# Patient Record
Sex: MALE | Race: WHITE | NOT HISPANIC OR LATINO | Employment: OTHER | URBAN - METROPOLITAN AREA
[De-identification: names, ages, dates, MRNs, and addresses within clinical notes are randomized per-mention and may not be internally consistent; named-entity substitution may affect disease eponyms.]

---

## 2017-01-31 ENCOUNTER — OFFICE VISIT (OUTPATIENT)
Dept: INTERNAL MEDICINE | Facility: CLINIC | Age: 82
End: 2017-01-31

## 2017-01-31 VITALS
WEIGHT: 223.6 LBS | DIASTOLIC BLOOD PRESSURE: 90 MMHG | SYSTOLIC BLOOD PRESSURE: 148 MMHG | HEIGHT: 71 IN | BODY MASS INDEX: 31.3 KG/M2

## 2017-01-31 DIAGNOSIS — I25.10 CORONARY ARTERY DISEASE INVOLVING NATIVE CORONARY ARTERY OF NATIVE HEART WITHOUT ANGINA PECTORIS: ICD-10-CM

## 2017-01-31 DIAGNOSIS — R26.89 BALANCE PROBLEM: ICD-10-CM

## 2017-01-31 DIAGNOSIS — I10 ESSENTIAL HYPERTENSION: Primary | ICD-10-CM

## 2017-01-31 DIAGNOSIS — R53.82 CHRONIC FATIGUE: ICD-10-CM

## 2017-01-31 DIAGNOSIS — E78.49 OTHER HYPERLIPIDEMIA: ICD-10-CM

## 2017-01-31 LAB
ALBUMIN SERPL-MCNC: 3.85 G/DL (ref 3.4–4.6)
ALBUMIN/GLOB SERPL: 1.1 G/DL
ALP SERPL-CCNC: 88 U/L (ref 46–116)
ALT SERPL W P-5'-P-CCNC: 19 U/L (ref 16–63)
ANION GAP SERPL CALCULATED.3IONS-SCNC: 11 MMOL/L
AST SERPL-CCNC: 16 U/L (ref 7–37)
BILIRUB SERPL-MCNC: 0.9 MG/DL (ref 0.2–1)
BUN BLD-MCNC: 19 MG/DL (ref 6–22)
BUN/CREAT SERPL: 19 (ref 7–25)
CALCIUM SPEC-SCNC: 9.2 MG/DL (ref 8.6–10.5)
CHLORIDE SERPL-SCNC: 102 MMOL/L (ref 95–107)
CHOLEST SERPL-MCNC: 125 MG/DL (ref 0–200)
CO2 SERPL-SCNC: 28 MMOL/L (ref 23–32)
CREAT BLD-MCNC: 1 MG/DL (ref 0.7–1.3)
GFR SERPL CREATININE-BSD FRML MDRD: 72 ML/MIN/1.73
GLOBULIN UR ELPH-MCNC: 3.5 GM/DL
GLUCOSE BLD-MCNC: 102 MG/DL (ref 70–100)
HDLC SERPL-MCNC: 61 MG/DL (ref 40–81)
LDLC SERPL CALC-MCNC: 55 MG/DL (ref 0–100)
LDLC/HDLC SERPL: 0.9 {RATIO}
POTASSIUM BLD-SCNC: 4.5 MMOL/L (ref 3.3–5.3)
PROT SERPL-MCNC: 7.3 G/DL (ref 6.3–8.4)
SODIUM BLD-SCNC: 141 MMOL/L (ref 136–145)
TRIGL SERPL-MCNC: 47 MG/DL (ref 0–150)
VLDLC SERPL-MCNC: 9.4 MG/DL

## 2017-01-31 PROCEDURE — 36415 COLL VENOUS BLD VENIPUNCTURE: CPT | Performed by: INTERNAL MEDICINE

## 2017-01-31 PROCEDURE — 80061 LIPID PANEL: CPT | Performed by: INTERNAL MEDICINE

## 2017-01-31 PROCEDURE — 80053 COMPREHEN METABOLIC PANEL: CPT | Performed by: INTERNAL MEDICINE

## 2017-01-31 PROCEDURE — 99213 OFFICE O/P EST LOW 20 MIN: CPT | Performed by: INTERNAL MEDICINE

## 2017-01-31 NOTE — PROGRESS NOTES
"Subjective   Max Shah is a 82 y.o. male here for   Chief Complaint   Patient presents with   • Hyperlipidemia     6 month follow-up   • Hypertension     6 month follow-up   .    Vitals:    01/31/17 1057   BP: 148/90   BP Location: Left arm   Patient Position: Sitting   Cuff Size: Adult   Weight: 223 lb 9.6 oz (101 kg)   Height: 70.5\" (179.1 cm)       Hyperlipidemia   This is a chronic problem. The current episode started more than 1 year ago. The problem is controlled. Recent lipid tests were reviewed and are normal. He has no history of chronic renal disease, diabetes, hypothyroidism or liver disease. Pertinent negatives include no chest pain or shortness of breath.   Hypertension   This is a chronic problem. The current episode started more than 1 year ago. The problem is unchanged. The problem is controlled. Pertinent negatives include no chest pain, palpitations, peripheral edema or shortness of breath. There is no history of chronic renal disease.        Encounter Diagnoses   Name Primary?   • Essential hypertension Yes   • Other hyperlipidemia    • Coronary artery disease involving native coronary artery of native heart without angina pectoris    • Chronic fatigue    • Balance problem        The following portions of the patient's history were reviewed and updated as appropriate: allergies, current medications, past social history and problem list.    Review of Systems   Constitutional: Positive for fatigue. Negative for chills and fever.   Respiratory: Negative for cough, shortness of breath and wheezing.    Cardiovascular: Negative for chest pain, palpitations and leg swelling.   Neurological: Positive for dizziness (occ-with turning head).   Psychiatric/Behavioral: Negative for dysphoric mood and sleep disturbance. The patient is not nervous/anxious.        Objective   Physical Exam   Constitutional: He appears well-developed and well-nourished. No distress.   Cardiovascular: Normal rate, regular " rhythm and normal heart sounds.    Pulmonary/Chest: No respiratory distress. He has no wheezes. He has no rales. He exhibits no tenderness.   Musculoskeletal: He exhibits no edema.   Psychiatric: He has a normal mood and affect. His behavior is normal.   Nursing note and vitals reviewed.      Assessment/Plan   Problem List Items Addressed This Visit        Unprioritized    Hypertension - Primary    Relevant Orders    Comprehensive Metabolic Panel    Lipid Panel    CAD (coronary artery disease)    Fatigue    Hyperlipidemia    Relevant Orders    Comprehensive Metabolic Panel    Lipid Panel      Other Visit Diagnoses     Balance problem        Relevant Orders    Ambulatory Referral to Physical Therapy Evaluate and treat         HTN stable.   Poor balance - need eval by PT.  Will see ENT if sx persist.     Need Tdap, pneu & shingles vaccines if not already done.  Need wellness exam.

## 2017-01-31 NOTE — MR AVS SNAPSHOT
Max Shah   1/31/2017 10:30 AM   Office Visit    Dept Phone:  [phone]   Encounter #:  [id number]    Provider:  Brianne Solomon MD   Department:  [facility] INTERNAL MEDICINE                Your Full Care Plan              Today's Medication Changes          These changes are accurate as of: 1/31/17 11:27 AM.  If you have any questions, ask your nurse or doctor.               Medication(s)that have changed:     losartan 100 MG tablet   Commonly known as:  COZAAR   TAKE 1 TABLET EVERY DAY   What changed:  Another medication with the same name was removed. Continue taking this medication, and follow the directions you see here.   Changed by:  Brianne Solomon MD                  Your Updated Medication List          This list is accurate as of: 1/31/17 11:27 AM.  Always use your most recent med list.                aspirin 81 MG EC tablet       diltiaZEM 120 MG 24 hr capsule   Commonly known as:  TIAZAC       losartan 100 MG tablet   Commonly known as:  COZAAR   TAKE 1 TABLET EVERY DAY       MULTI-VITAMIN DAILY PO       NITROSTAT 0.4 MG SL tablet   Generic drug:  nitroglycerin       simvastatin 20 MG tablet   Commonly known as:  ZOCOR               We Performed the Following     Ambulatory Referral to Physical Therapy Evaluate and treat       You Were Diagnosed With        Codes Comments    Essential hypertension    -  Primary ICD-10-CM: I10  ICD-9-CM: 401.9     Other hyperlipidemia     ICD-10-CM: E78.4  ICD-9-CM: 272.4     Coronary artery disease involving native coronary artery of native heart without angina pectoris     ICD-10-CM: I25.10  ICD-9-CM: 414.01     Chronic fatigue     ICD-10-CM: R53.82  ICD-9-CM: 780.79     Balance problem     ICD-10-CM: R26.89  ICD-9-CM: 781.99       Instructions     None    Patient Instructions History      Upcoming Appointments     Visit Type Date Time Department    FOLLOW UP 1/31/2017 10:30 AM RAFAEL AGUIRRE  "MEDICARE WELLNESS 2017 10:30 AM RAFAEL KRAMER      [facility] Signup     [facility] Setred allows you to send messages to your doctor, view your test results, renew your prescriptions, schedule appointments, and more. To sign up, go to [employer] and click on the Sign Up Now link in the New User? box. Enter your Setred Activation Code exactly as it appears below along with the last four digits of your Social Security Number and your Date of Birth () to complete the sign-up process. If you do not sign up before the expiration date, you must request a new code.    Setred Activation Code: [email or web address]  Expires: 2017 11:27 AM    If you have questions, you can email Belsito [email or web address] or call [phone] to talk to our Setred staff. Remember, Setred is NOT to be used for urgent needs. For medical emergencies, dial 911.               Other Info from Your Visit           Your Appointments     Aug 01, 2017 10:30 AM EDT   Subsequent Medicare Wellness with Brianne Solomon MD   [place] [facility] INTERNAL MEDICINE (--)    [address] [address] [phone]              Allergies     No Known Allergies      Reason for Visit     Hyperlipidemia 6 month follow-up    Hypertension 6 month follow-up      Vital Signs     Blood Pressure Height Weight Body Mass Index Smoking Status       148/90 (BP Location: Left arm, Patient Position: Sitting, Cuff Size: Adult) 70.5\" (179.1 cm) 223 lb 9.6 oz (101 kg) 31.63 kg/m2 Never Smoker       Problems and Diagnoses Noted     Coronary heart disease    Tiredness    High cholesterol or triglycerides    High blood pressure    Balance problem            "

## 2017-02-14 ENCOUNTER — HOSPITAL ENCOUNTER (OUTPATIENT)
Facility: HOSPITAL | Age: 82
Setting detail: HOSPITAL OUTPATIENT SURGERY
Discharge: HOME | End: 2017-02-14
Attending: OPHTHALMOLOGY | Admitting: OPHTHALMOLOGY

## 2017-02-14 ENCOUNTER — ANESTHESIA EVENT (OUTPATIENT)
Dept: PERIOP | Facility: HOSPITAL | Age: 82
End: 2017-02-14

## 2017-02-14 ENCOUNTER — ANESTHESIA (OUTPATIENT)
Dept: PERIOP | Facility: HOSPITAL | Age: 82
End: 2017-02-14

## 2017-02-14 VITALS
SYSTOLIC BLOOD PRESSURE: 151 MMHG | HEART RATE: 72 BPM | RESPIRATION RATE: 16 BRPM | TEMPERATURE: 97.8 F | BODY MASS INDEX: 31.12 KG/M2 | WEIGHT: 220 LBS | DIASTOLIC BLOOD PRESSURE: 73 MMHG | OXYGEN SATURATION: 94 %

## 2017-02-14 PROCEDURE — 25010000002 PROPOFOL 10 MG/ML EMULSION: Performed by: NURSE ANESTHETIST, CERTIFIED REGISTERED

## 2017-02-14 PROCEDURE — 25010000002 EPINEPHRINE PER 0.1 MG: Performed by: OPHTHALMOLOGY

## 2017-02-14 PROCEDURE — C1780 LENS, INTRAOCULAR (NEW TECH): HCPCS | Performed by: OPHTHALMOLOGY

## 2017-02-14 PROCEDURE — 25010000002 FENTANYL CITRATE (PF) 100 MCG/2ML SOLUTION: Performed by: NURSE ANESTHETIST, CERTIFIED REGISTERED

## 2017-02-14 DEVICE — LENS ACRYSOF IQ 6X13MM SN60WF 21.0: Type: IMPLANTABLE DEVICE | Site: EYE | Status: FUNCTIONAL

## 2017-02-14 RX ORDER — HYDRALAZINE HYDROCHLORIDE 20 MG/ML
5 INJECTION INTRAMUSCULAR; INTRAVENOUS
Status: CANCELLED | OUTPATIENT
Start: 2017-02-14

## 2017-02-14 RX ORDER — MAGNESIUM HYDROXIDE 1200 MG/15ML
LIQUID ORAL AS NEEDED
Status: DISCONTINUED | OUTPATIENT
Start: 2017-02-14 | End: 2017-02-14 | Stop reason: HOSPADM

## 2017-02-14 RX ORDER — TROPICAMIDE 10 MG/ML
2 SOLUTION/ DROPS OPHTHALMIC
Status: DISCONTINUED | OUTPATIENT
Start: 2017-02-14 | End: 2017-02-14

## 2017-02-14 RX ORDER — MIDAZOLAM HYDROCHLORIDE 1 MG/ML
1 INJECTION INTRAMUSCULAR; INTRAVENOUS
Status: DISCONTINUED | OUTPATIENT
Start: 2017-02-14 | End: 2017-02-14 | Stop reason: HOSPADM

## 2017-02-14 RX ORDER — PROPOFOL 10 MG/ML
VIAL (ML) INTRAVENOUS AS NEEDED
Status: DISCONTINUED | OUTPATIENT
Start: 2017-02-14 | End: 2017-02-14 | Stop reason: SURG

## 2017-02-14 RX ORDER — MIDAZOLAM HYDROCHLORIDE 1 MG/ML
2 INJECTION INTRAMUSCULAR; INTRAVENOUS
Status: DISCONTINUED | OUTPATIENT
Start: 2017-02-14 | End: 2017-02-14 | Stop reason: HOSPADM

## 2017-02-14 RX ORDER — SODIUM CHLORIDE 0.9 % (FLUSH) 0.9 %
1-10 SYRINGE (ML) INJECTION AS NEEDED
Status: DISCONTINUED | OUTPATIENT
Start: 2017-02-14 | End: 2017-02-14 | Stop reason: HOSPADM

## 2017-02-14 RX ORDER — FLUMAZENIL 0.1 MG/ML
0.2 INJECTION INTRAVENOUS AS NEEDED
Status: CANCELLED | OUTPATIENT
Start: 2017-02-14

## 2017-02-14 RX ORDER — TROPICAMIDE 10 MG/ML
1 SOLUTION/ DROPS OPHTHALMIC ONCE
Status: DISCONTINUED | OUTPATIENT
Start: 2017-02-14 | End: 2017-02-14

## 2017-02-14 RX ORDER — SODIUM CHLORIDE, SODIUM LACTATE, POTASSIUM CHLORIDE, CALCIUM CHLORIDE 600; 310; 30; 20 MG/100ML; MG/100ML; MG/100ML; MG/100ML
9 INJECTION, SOLUTION INTRAVENOUS CONTINUOUS
Status: DISCONTINUED | OUTPATIENT
Start: 2017-02-14 | End: 2017-02-14 | Stop reason: HOSPADM

## 2017-02-14 RX ORDER — FENTANYL CITRATE 50 UG/ML
50 INJECTION, SOLUTION INTRAMUSCULAR; INTRAVENOUS
Status: CANCELLED | OUTPATIENT
Start: 2017-02-14

## 2017-02-14 RX ORDER — FENTANYL CITRATE 50 UG/ML
INJECTION, SOLUTION INTRAMUSCULAR; INTRAVENOUS AS NEEDED
Status: DISCONTINUED | OUTPATIENT
Start: 2017-02-14 | End: 2017-02-14 | Stop reason: SURG

## 2017-02-14 RX ORDER — HYDROMORPHONE HYDROCHLORIDE 1 MG/ML
0.25 INJECTION, SOLUTION INTRAMUSCULAR; INTRAVENOUS; SUBCUTANEOUS
Status: CANCELLED | OUTPATIENT
Start: 2017-02-14

## 2017-02-14 RX ORDER — NEOMYCIN SULFATE, POLYMYXIN B SULFATE, AND DEXAMETHASONE 3.5; 10000; 1 MG/G; [USP'U]/G; MG/G
OINTMENT OPHTHALMIC AS NEEDED
Status: DISCONTINUED | OUTPATIENT
Start: 2017-02-14 | End: 2017-02-14 | Stop reason: HOSPADM

## 2017-02-14 RX ORDER — PROPARACAINE HYDROCHLORIDE 5 MG/ML
SOLUTION/ DROPS OPHTHALMIC AS NEEDED
Status: DISCONTINUED | OUTPATIENT
Start: 2017-02-14 | End: 2017-02-14 | Stop reason: HOSPADM

## 2017-02-14 RX ORDER — PROMETHAZINE HYDROCHLORIDE 25 MG/ML
12.5 INJECTION, SOLUTION INTRAMUSCULAR; INTRAVENOUS ONCE AS NEEDED
Status: CANCELLED | OUTPATIENT
Start: 2017-02-14

## 2017-02-14 RX ORDER — LABETALOL HYDROCHLORIDE 5 MG/ML
5 INJECTION, SOLUTION INTRAVENOUS
Status: CANCELLED | OUTPATIENT
Start: 2017-02-14

## 2017-02-14 RX ORDER — PROPARACAINE HYDROCHLORIDE 5 MG/ML
1 SOLUTION/ DROPS OPHTHALMIC ONCE
Status: COMPLETED | OUTPATIENT
Start: 2017-02-14 | End: 2017-02-14

## 2017-02-14 RX ORDER — FENTANYL CITRATE 50 UG/ML
100 INJECTION, SOLUTION INTRAMUSCULAR; INTRAVENOUS
Status: DISCONTINUED | OUTPATIENT
Start: 2017-02-14 | End: 2017-02-14 | Stop reason: HOSPADM

## 2017-02-14 RX ORDER — PROMETHAZINE HYDROCHLORIDE 25 MG/1
25 TABLET ORAL ONCE AS NEEDED
Status: CANCELLED | OUTPATIENT
Start: 2017-02-14

## 2017-02-14 RX ORDER — PROPARACAINE HYDROCHLORIDE 5 MG/ML
1 SOLUTION/ DROPS OPHTHALMIC ONCE
Status: DISCONTINUED | OUTPATIENT
Start: 2017-02-14 | End: 2017-02-14

## 2017-02-14 RX ORDER — PHENYLEPHRINE HCL 2.5 %
1 DROPS OPHTHALMIC (EYE)
Status: COMPLETED | OUTPATIENT
Start: 2017-02-14 | End: 2017-02-14

## 2017-02-14 RX ORDER — PHENYLEPHRINE HCL 2.5 %
1 DROPS OPHTHALMIC (EYE) ONCE
Status: DISCONTINUED | OUTPATIENT
Start: 2017-02-14 | End: 2017-02-14

## 2017-02-14 RX ORDER — PROMETHAZINE HYDROCHLORIDE 25 MG/1
25 SUPPOSITORY RECTAL ONCE AS NEEDED
Status: CANCELLED | OUTPATIENT
Start: 2017-02-14

## 2017-02-14 RX ORDER — LIDOCAINE HYDROCHLORIDE 10 MG/ML
5 INJECTION, SOLUTION EPIDURAL; INFILTRATION; INTRACAUDAL; PERINEURAL ONCE AS NEEDED
Status: COMPLETED | OUTPATIENT
Start: 2017-02-14 | End: 2017-02-14

## 2017-02-14 RX ORDER — ONDANSETRON 2 MG/ML
4 INJECTION INTRAMUSCULAR; INTRAVENOUS ONCE AS NEEDED
Status: CANCELLED | OUTPATIENT
Start: 2017-02-14

## 2017-02-14 RX ORDER — LIDOCAINE HYDROCHLORIDE AND EPINEPHRINE BITARTRATE 20; .01 MG/ML; MG/ML
INJECTION, SOLUTION SUBCUTANEOUS AS NEEDED
Status: DISCONTINUED | OUTPATIENT
Start: 2017-02-14 | End: 2017-02-14 | Stop reason: HOSPADM

## 2017-02-14 RX ORDER — PROMETHAZINE HYDROCHLORIDE 25 MG/1
12.5 TABLET ORAL ONCE AS NEEDED
Status: CANCELLED | OUTPATIENT
Start: 2017-02-14 | End: 2017-02-15

## 2017-02-14 RX ORDER — TROPICAMIDE 10 MG/ML
1 SOLUTION/ DROPS OPHTHALMIC
Status: COMPLETED | OUTPATIENT
Start: 2017-02-14 | End: 2017-02-14

## 2017-02-14 RX ORDER — HYDROCODONE BITARTRATE AND ACETAMINOPHEN 7.5; 325 MG/1; MG/1
1 TABLET ORAL ONCE AS NEEDED
Status: CANCELLED | OUTPATIENT
Start: 2017-02-14 | End: 2017-02-15

## 2017-02-14 RX ORDER — FAMOTIDINE 10 MG/ML
20 INJECTION, SOLUTION INTRAVENOUS ONCE
Status: DISCONTINUED | OUTPATIENT
Start: 2017-02-14 | End: 2017-02-14 | Stop reason: HOSPADM

## 2017-02-14 RX ORDER — HYDROMORPHONE HYDROCHLORIDE 1 MG/ML
0.5 INJECTION, SOLUTION INTRAMUSCULAR; INTRAVENOUS; SUBCUTANEOUS
Status: CANCELLED | OUTPATIENT
Start: 2017-02-14

## 2017-02-14 RX ORDER — OXYCODONE AND ACETAMINOPHEN 7.5; 325 MG/1; MG/1
1 TABLET ORAL ONCE AS NEEDED
Status: CANCELLED | OUTPATIENT
Start: 2017-02-14 | End: 2017-02-15

## 2017-02-14 RX ORDER — PHENYLEPHRINE HCL 2.5 %
2 DROPS OPHTHALMIC (EYE)
Status: DISCONTINUED | OUTPATIENT
Start: 2017-02-14 | End: 2017-02-14

## 2017-02-14 RX ORDER — NALOXONE HCL 0.4 MG/ML
0.2 VIAL (ML) INJECTION AS NEEDED
Status: CANCELLED | OUTPATIENT
Start: 2017-02-14

## 2017-02-14 RX ORDER — DIPHENHYDRAMINE HYDROCHLORIDE 50 MG/ML
12.5 INJECTION INTRAMUSCULAR; INTRAVENOUS
Status: CANCELLED | OUTPATIENT
Start: 2017-02-14

## 2017-02-14 RX ADMIN — PROPOFOL 20 MG: 10 INJECTION, EMULSION INTRAVENOUS at 14:38

## 2017-02-14 RX ADMIN — FENTANYL CITRATE 25 MCG: 50 INJECTION INTRAMUSCULAR; INTRAVENOUS at 14:40

## 2017-02-14 RX ADMIN — SODIUM CHLORIDE, POTASSIUM CHLORIDE, SODIUM LACTATE AND CALCIUM CHLORIDE 9 ML/HR: 600; 310; 30; 20 INJECTION, SOLUTION INTRAVENOUS at 12:55

## 2017-02-14 RX ADMIN — PROPOFOL 10 MG: 10 INJECTION, EMULSION INTRAVENOUS at 14:49

## 2017-02-14 RX ADMIN — PROPOFOL 20 MG: 10 INJECTION, EMULSION INTRAVENOUS at 14:37

## 2017-02-14 RX ADMIN — PROPOFOL 10 MG: 10 INJECTION, EMULSION INTRAVENOUS at 14:47

## 2017-02-14 RX ADMIN — FENTANYL CITRATE 25 MCG: 50 INJECTION INTRAMUSCULAR; INTRAVENOUS at 14:34

## 2017-02-14 RX ADMIN — PHENYLEPHRINE HYDROCHLORIDE 1 DROP: 25 SOLUTION/ DROPS OPHTHALMIC at 13:07

## 2017-02-14 RX ADMIN — PHENYLEPHRINE HYDROCHLORIDE 1 DROP: 25 SOLUTION/ DROPS OPHTHALMIC at 12:57

## 2017-02-14 RX ADMIN — LIDOCAINE HYDROCHLORIDE 0.5 ML: 10 INJECTION, SOLUTION EPIDURAL; INFILTRATION; INTRACAUDAL; PERINEURAL at 12:55

## 2017-02-14 RX ADMIN — PROPOFOL 20 MG: 10 INJECTION, EMULSION INTRAVENOUS at 14:39

## 2017-02-14 RX ADMIN — TROPICAMIDE 1 DROP: 10 SOLUTION/ DROPS OPHTHALMIC at 13:07

## 2017-02-14 RX ADMIN — TROPICAMIDE 1 DROP: 10 SOLUTION/ DROPS OPHTHALMIC at 12:57

## 2017-02-14 RX ADMIN — FENTANYL CITRATE 25 MCG: 50 INJECTION INTRAMUSCULAR; INTRAVENOUS at 14:52

## 2017-02-14 RX ADMIN — FENTANYL CITRATE 25 MCG: 50 INJECTION INTRAMUSCULAR; INTRAVENOUS at 14:25

## 2017-02-14 RX ADMIN — PROPARACAINE HYDROCHLORIDE 1 DROP: 5 SOLUTION/ DROPS OPHTHALMIC at 12:56

## 2017-02-14 NOTE — PLAN OF CARE
Problem: Patient Care Overview (Adult)  Goal: Discharge Needs Assessment  Outcome: Ongoing (interventions implemented as appropriate)    02/14/17 1223   Discharge Needs Assessment   Concerns To Be Addressed no discharge needs identified   Equipment Needed After Discharge none   Discharge Disposition home or self-care   Current Health   Anticipated Changes Related to Illness none   Self-Care   Equipment Currently Used at Home none   Living Environment   Transportation Available car;family or friend will provide

## 2017-02-14 NOTE — PLAN OF CARE
Problem: Perioperative Period (Adult)  Goal: Signs and Symptoms of Listed Potential Problems Will be Absent or Manageable (Perioperative Period)  Outcome: Ongoing (interventions implemented as appropriate)    02/14/17 1222   Perioperative Period   Problems Assessed (Perioperative Period) all   Problems Present (Perioperative Period) none

## 2017-02-14 NOTE — ANESTHESIA PREPROCEDURE EVALUATION
Anesthesia Evaluation     Patient summary reviewed and Nursing notes reviewed   NPO Status: > 8 hours   Airway   Mallampati: II  TM distance: >3 FB  Neck ROM: full  no difficulty expected  Dental - normal exam     Pulmonary - negative pulmonary ROS and normal exam   Cardiovascular - normal exam    (+) hypertension, CAD,       Neuro/Psych- negative ROS  GI/Hepatic/Renal/Endo - negative ROS     Musculoskeletal (-) negative ROS    Abdominal  - normal exam   Substance History - negative use     OB/GYN negative ob/gyn ROS         Other                                    Anesthesia Plan    ASA 3     MAC     intravenous induction   Anesthetic plan and risks discussed with patient.    Plan discussed with CRNA.

## 2017-02-14 NOTE — H&P
this patient enters hospital for surgical correction of cataract the patient had painless progressive loss of visual acuity for some time the right eye is getting the point with the patient cannot function properly is interfering with his lifestyle and needs therefore enters hospital for surgical correction.  Patient Care Team:  Brianne Solomon MD as PCP - General (Internal Medicine)  Lulú Bettencourt MD as Consulting Physician (Cardiology)  Bear Boyd MD as Consulting Physician (Otolaryngology)  Skip Malik MD as Consulting Physician (Gastroenterology)  Clarke Bettencourt MD as Consulting Physician (Dermatology)  Audrey Elena MD as Consulting Physician (Vascular Surgery)    Chief complaint is blurred vision of the right eye.    Subjective     History of Present IllnessReview of Systems    this patient enters hospital for surgical correction off a dense cataract of the right eye.  This patient has had painless progressive loss of visual acuity for some time the visual acuity is decreased to point where he's had for its interfering with his lifestyle and needs he is having difficulty driving a car also has tremendous glare at night enters hospital for surgical correction.  This patient involving office for some time and of answered all questions and is well very cooperative and also anxious anxious to have the surgery done.  All systems been reviewed and patient generally in good health for his age.  He is 82 years old and very active person.      Past Medical History   Diagnosis Date   • Arthritis    • CAD (coronary artery disease)    • Cataracts, bilateral    • Diverticulitis    • Fatigue    • GERD (gastroesophageal reflux disease)    • Hypertension    • Knee pain    • PAOD (peripheral arterial occlusive disease)    • Shortness of breath    • Varicose veins      Past Surgical History   Procedure Laterality Date   • Carotid artery angioplasty  01/12/2001   • Cardiac catheterization     •  Thromboendarterectomy Right    • Angioplasty  2002     Cerebral     Family History   Problem Relation Age of Onset   • Heart disease Other    • Cancer Other    • Breast cancer Mother       age 75   • Atrial fibrillation Father       age 82     Social History   Substance Use Topics   • Smoking status: Never Smoker   • Smokeless tobacco: Never Used   • Alcohol use Yes      Comment: moderate     Prescriptions Prior to Admission   Medication Sig Dispense Refill Last Dose   • aspirin 81 MG EC tablet Take 81 mg by mouth daily.   2017 at Unknown time   • diltiazem (TIAZAC) 120 MG 24 hr capsule Take 120 mg by mouth.   2017 at Unknown time   • losartan (COZAAR) 100 MG tablet TAKE 1 TABLET EVERY DAY 90 tablet 3 2017 at Unknown time   • Multiple Vitamin (MULTI-VITAMIN DAILY PO) Take 1 tablet by mouth Daily.   2017 at Unknown time   • nitroglycerin (NITROSTAT) 0.4 MG SL tablet Place 0.4 mg under the tongue every 5 (five) minutes.   2017 at Unknown time   • simvastatin (ZOCOR) 20 MG tablet Take 20 mg by mouth.   2017 at Unknown time     Allergies:  Review of patient's allergies indicates no known allergies.    Objective      Vital Signs  Temp:  [95.5 °F (35.3 °C)] 95.5 °F (35.3 °C)  Heart Rate:  [75] 75  Resp:  [16] 16  BP: (156)/(100) 156/100    Physical Exam exam reveals a well-developed well-nourished man who is cooperative and anxious and very alert for his age.  He is oriented ×3 in and wants to have cataract surgery.  The best vision of the right eye is 20/50 -3 cannot be improved he's been involving office for some time and and I noticed the progression of the cataract of the last few years.  Head mesencephalic chest good expansion heart regular sinus rhythm abdomen soft and no masses extremities normal neurological normal.      Results Review:   I reviewed the patient's new clinical results.      Assessment/Plan   plan is to do cataract operation lens implantation of the right  eye of age and all questions and also discusses with family and the patient.            Active Problems:    * No active hospital problems. *        I discussed the patients findings and my recommendations with patient and family    Syed Tang MD  02/14/17  12:56 PM

## 2017-02-14 NOTE — PLAN OF CARE
Problem: Patient Care Overview (Adult)  Goal: Plan of Care Review  Outcome: Outcome(s) achieved Date Met:  02/14/17 02/14/17 1523   Coping/Psychosocial Response Interventions   Plan Of Care Reviewed With patient       Goal: Discharge Needs Assessment  Outcome: Outcome(s) achieved Date Met:  02/14/17 02/14/17 1523   Discharge Needs Assessment   Concerns To Be Addressed denies needs/concerns at this time

## 2017-02-14 NOTE — PLAN OF CARE
Problem: Patient Care Overview (Adult)  Goal: Plan of Care Review  Outcome: Ongoing (interventions implemented as appropriate)    02/14/17 1223   Coping/Psychosocial Response Interventions   Plan Of Care Reviewed With patient   Patient Care Overview   Progress improving

## 2017-02-14 NOTE — PLAN OF CARE
Problem: Perioperative Period (Adult)  Goal: Signs and Symptoms of Listed Potential Problems Will be Absent or Manageable (Perioperative Period)  Outcome: Unable to achieve outcome(s) by discharge Date Met:  02/14/17

## 2017-02-14 NOTE — ANESTHESIA POSTPROCEDURE EVALUATION
Patient: Max Shah    Procedure Summary     Date Anesthesia Start Anesthesia Stop Room / Location    02/14/17 1419 1515  [facility] / [facility]       Procedure Diagnosis Surgeon Provider    LT CATARACT PHACO EXTRACTION WITH INTRAOCULAR LENS IMPLANT (Left Eye) No diagnosis on file. MD Joe Dugan MD          Anesthesia Type: MAC  Last vitals  /73 (02/14/17 1530)    Temp 36.6 °C (97.8 °F) (02/14/17 1514)    Pulse 72 (02/14/17 1530)   Resp 16 (02/14/17 1530)    SpO2 94 % (02/14/17 1530)      Post Anesthesia Care and Evaluation    Patient location during evaluation: PACU  Patient participation: complete - patient participated  Level of consciousness: awake and alert  Pain management: adequate  Airway patency: patent  Anesthetic complications: No anesthetic complications    Cardiovascular status: acceptable  Respiratory status: acceptable  Hydration status: acceptable

## 2017-02-15 NOTE — OP NOTE
Date of Procedure:  02/14/2017     PREOPERATIVE DIAGNOSIS: Nuclear sclerotic cataract and posterior plaque-type cataract of the left eye.     POSTOPERATIVE DIAGNOSIS: Nuclear sclerotic cataract and posterior plaque-type cataract of the left eye.     PROCEDURES PERFORMED: Phacoemulsification and lens implantation, left eye. Type of lens is Paul posterior chamber, model # [id number]. Power is 21 diopters.     SURGEON: Syed Tang MD    ANESTHESIA: Local monitored.     DESCRIPTION OF PROCEDURE: This patient was brought to the operating room where a peribulbar injection of 2% Xylocaine was given. A soft eye was obtained. Routine preparation and drape was carried out and the eye was irrigated with copious amounts of povidone iodine solution. During the operation, sedation and monitoring was given by the anesthesiology department. There were no complications or problems during the procedure and the patient left the operating room in excellent condition.     The microscope was used and a clear corneal 2.4 mm incision was made temporally. The eye was filled with Viscoat and a paracentesis was done at 90°. Then, a round circular continuous capsulorrhexis was perfected without difficulty and this was followed by adequate hydrodissection loosened the nucleus. After the nucleus was loosened satisfactorily, bimanual endolenticular phacoemulsification was done slowly and carefully. The irrigation and aspiration tip was used to remove all cortical material and then  posterior capsule was polished satisfactorily. The lens implant then was placed in the capsular bag with Provisc assistance. After this was done and the viscoelastic material was carefully removed from the eye, the wound was hydrated and intraocular pressure was adjusted. Then again, the eye was irrigated with copious amounts of povidone iodine solution. Antibiotics were applied. A shield was placed and the patient left the operating room in excellent condition.        Syed Tang M.D.  HCS:abimael  D:   02/14/2017 15:17:48  T:   02/14/2017 19:36:05  Job ID:   [id number]  Document ID:   [id number]  cc:

## 2017-02-28 ENCOUNTER — ANESTHESIA (OUTPATIENT)
Dept: PERIOP | Facility: HOSPITAL | Age: 82
End: 2017-02-28

## 2017-02-28 ENCOUNTER — ANESTHESIA EVENT (OUTPATIENT)
Dept: PERIOP | Facility: HOSPITAL | Age: 82
End: 2017-02-28

## 2017-02-28 ENCOUNTER — HOSPITAL ENCOUNTER (OUTPATIENT)
Facility: HOSPITAL | Age: 82
Setting detail: HOSPITAL OUTPATIENT SURGERY
Discharge: HOME | End: 2017-02-28
Attending: OPHTHALMOLOGY | Admitting: OPHTHALMOLOGY

## 2017-02-28 VITALS
DIASTOLIC BLOOD PRESSURE: 85 MMHG | SYSTOLIC BLOOD PRESSURE: 138 MMHG | RESPIRATION RATE: 20 BRPM | WEIGHT: 222 LBS | TEMPERATURE: 97.6 F | HEART RATE: 86 BPM | BODY MASS INDEX: 31.4 KG/M2 | OXYGEN SATURATION: 95 %

## 2017-02-28 PROCEDURE — 25010000002 MIDAZOLAM PER 1 MG: Performed by: NURSE ANESTHETIST, CERTIFIED REGISTERED

## 2017-02-28 PROCEDURE — 25010000002 EPINEPHRINE 1 MG/ML SOLUTION: Performed by: OPHTHALMOLOGY

## 2017-02-28 PROCEDURE — C1780 LENS, INTRAOCULAR (NEW TECH): HCPCS | Performed by: OPHTHALMOLOGY

## 2017-02-28 PROCEDURE — 25010000002 PROPOFOL 10 MG/ML EMULSION: Performed by: NURSE ANESTHETIST, CERTIFIED REGISTERED

## 2017-02-28 DEVICE — LENS ACRYSOF IQ 6X13MM SN60WF 20.0: Type: IMPLANTABLE DEVICE | Site: EYE | Status: FUNCTIONAL

## 2017-02-28 RX ORDER — HYDROCODONE BITARTRATE AND ACETAMINOPHEN 7.5; 325 MG/1; MG/1
1 TABLET ORAL ONCE AS NEEDED
Status: DISCONTINUED | OUTPATIENT
Start: 2017-02-28 | End: 2017-02-28 | Stop reason: HOSPADM

## 2017-02-28 RX ORDER — LABETALOL HYDROCHLORIDE 5 MG/ML
5 INJECTION, SOLUTION INTRAVENOUS
Status: DISCONTINUED | OUTPATIENT
Start: 2017-02-28 | End: 2017-02-28 | Stop reason: HOSPADM

## 2017-02-28 RX ORDER — NEOMYCIN SULFATE, POLYMYXIN B SULFATE AND DEXAMETHASONE 3.5; 10000; 1 MG/ML; [USP'U]/ML; MG/ML
SUSPENSION/ DROPS OPHTHALMIC AS NEEDED
Status: DISCONTINUED | OUTPATIENT
Start: 2017-02-28 | End: 2017-02-28 | Stop reason: HOSPADM

## 2017-02-28 RX ORDER — ONDANSETRON 2 MG/ML
4 INJECTION INTRAMUSCULAR; INTRAVENOUS ONCE AS NEEDED
Status: DISCONTINUED | OUTPATIENT
Start: 2017-02-28 | End: 2017-02-28 | Stop reason: HOSPADM

## 2017-02-28 RX ORDER — SODIUM CHLORIDE 0.9 % (FLUSH) 0.9 %
1-10 SYRINGE (ML) INJECTION AS NEEDED
Status: DISCONTINUED | OUTPATIENT
Start: 2017-02-28 | End: 2017-02-28 | Stop reason: HOSPADM

## 2017-02-28 RX ORDER — OXYCODONE AND ACETAMINOPHEN 7.5; 325 MG/1; MG/1
1 TABLET ORAL ONCE AS NEEDED
Status: DISCONTINUED | OUTPATIENT
Start: 2017-02-28 | End: 2017-02-28 | Stop reason: HOSPADM

## 2017-02-28 RX ORDER — MIDAZOLAM HYDROCHLORIDE 1 MG/ML
2 INJECTION INTRAMUSCULAR; INTRAVENOUS
Status: DISCONTINUED | OUTPATIENT
Start: 2017-02-28 | End: 2017-02-28 | Stop reason: HOSPADM

## 2017-02-28 RX ORDER — HYDROCODONE BITARTRATE AND ACETAMINOPHEN 7.5; 325 MG/1; MG/1
1 TABLET ORAL ONCE AS NEEDED
Status: DISCONTINUED | OUTPATIENT
Start: 2017-02-28 | End: 2017-02-28 | Stop reason: SDUPTHER

## 2017-02-28 RX ORDER — NALOXONE HCL 0.4 MG/ML
0.2 VIAL (ML) INJECTION AS NEEDED
Status: DISCONTINUED | OUTPATIENT
Start: 2017-02-28 | End: 2017-02-28 | Stop reason: SDUPTHER

## 2017-02-28 RX ORDER — PHENYLEPHRINE HCL 2.5 %
1 DROPS OPHTHALMIC (EYE)
Status: COMPLETED | OUTPATIENT
Start: 2017-02-28 | End: 2017-02-28

## 2017-02-28 RX ORDER — FENTANYL CITRATE 50 UG/ML
25 INJECTION, SOLUTION INTRAMUSCULAR; INTRAVENOUS
Status: DISCONTINUED | OUTPATIENT
Start: 2017-02-28 | End: 2017-02-28 | Stop reason: HOSPADM

## 2017-02-28 RX ORDER — TROPICAMIDE 10 MG/ML
1 SOLUTION/ DROPS OPHTHALMIC
Status: COMPLETED | OUTPATIENT
Start: 2017-02-28 | End: 2017-02-28

## 2017-02-28 RX ORDER — PROPARACAINE HYDROCHLORIDE 5 MG/ML
SOLUTION/ DROPS OPHTHALMIC AS NEEDED
Status: DISCONTINUED | OUTPATIENT
Start: 2017-02-28 | End: 2017-02-28 | Stop reason: HOSPADM

## 2017-02-28 RX ORDER — HYDRALAZINE HYDROCHLORIDE 20 MG/ML
5 INJECTION INTRAMUSCULAR; INTRAVENOUS
Status: DISCONTINUED | OUTPATIENT
Start: 2017-02-28 | End: 2017-02-28 | Stop reason: SDUPTHER

## 2017-02-28 RX ORDER — MIDAZOLAM HYDROCHLORIDE 1 MG/ML
1 INJECTION INTRAMUSCULAR; INTRAVENOUS
Status: DISCONTINUED | OUTPATIENT
Start: 2017-02-28 | End: 2017-02-28 | Stop reason: HOSPADM

## 2017-02-28 RX ORDER — PROMETHAZINE HYDROCHLORIDE 25 MG/1
12.5 TABLET ORAL ONCE AS NEEDED
Status: DISCONTINUED | OUTPATIENT
Start: 2017-02-28 | End: 2017-02-28 | Stop reason: SDUPTHER

## 2017-02-28 RX ORDER — HYDROMORPHONE HYDROCHLORIDE 1 MG/ML
0.5 INJECTION, SOLUTION INTRAMUSCULAR; INTRAVENOUS; SUBCUTANEOUS
Status: DISCONTINUED | OUTPATIENT
Start: 2017-02-28 | End: 2017-02-28 | Stop reason: HOSPADM

## 2017-02-28 RX ORDER — FENTANYL CITRATE 50 UG/ML
50 INJECTION, SOLUTION INTRAMUSCULAR; INTRAVENOUS
Status: DISCONTINUED | OUTPATIENT
Start: 2017-02-28 | End: 2017-02-28 | Stop reason: HOSPADM

## 2017-02-28 RX ORDER — MIDAZOLAM HYDROCHLORIDE 1 MG/ML
INJECTION INTRAMUSCULAR; INTRAVENOUS AS NEEDED
Status: DISCONTINUED | OUTPATIENT
Start: 2017-02-28 | End: 2017-02-28 | Stop reason: SURG

## 2017-02-28 RX ORDER — SODIUM CHLORIDE, SODIUM LACTATE, POTASSIUM CHLORIDE, CALCIUM CHLORIDE 600; 310; 30; 20 MG/100ML; MG/100ML; MG/100ML; MG/100ML
9 INJECTION, SOLUTION INTRAVENOUS CONTINUOUS
Status: DISCONTINUED | OUTPATIENT
Start: 2017-02-28 | End: 2017-02-28 | Stop reason: HOSPADM

## 2017-02-28 RX ORDER — MAGNESIUM HYDROXIDE 1200 MG/15ML
LIQUID ORAL AS NEEDED
Status: DISCONTINUED | OUTPATIENT
Start: 2017-02-28 | End: 2017-02-28 | Stop reason: HOSPADM

## 2017-02-28 RX ORDER — DIPHENHYDRAMINE HYDROCHLORIDE 50 MG/ML
12.5 INJECTION INTRAMUSCULAR; INTRAVENOUS
Status: DISCONTINUED | OUTPATIENT
Start: 2017-02-28 | End: 2017-02-28 | Stop reason: HOSPADM

## 2017-02-28 RX ORDER — FLUMAZENIL 0.1 MG/ML
0.2 INJECTION INTRAVENOUS AS NEEDED
Status: DISCONTINUED | OUTPATIENT
Start: 2017-02-28 | End: 2017-02-28 | Stop reason: SDUPTHER

## 2017-02-28 RX ORDER — EPINEPHRINE 1 MG/ML
INJECTION INTRAMUSCULAR; INTRAVENOUS; SUBCUTANEOUS AS NEEDED
Status: DISCONTINUED | OUTPATIENT
Start: 2017-02-28 | End: 2017-02-28 | Stop reason: HOSPADM

## 2017-02-28 RX ORDER — FLUMAZENIL 0.1 MG/ML
0.2 INJECTION INTRAVENOUS AS NEEDED
Status: DISCONTINUED | OUTPATIENT
Start: 2017-02-28 | End: 2017-02-28 | Stop reason: HOSPADM

## 2017-02-28 RX ORDER — HYDROMORPHONE HYDROCHLORIDE 1 MG/ML
0.25 INJECTION, SOLUTION INTRAMUSCULAR; INTRAVENOUS; SUBCUTANEOUS
Status: DISCONTINUED | OUTPATIENT
Start: 2017-02-28 | End: 2017-02-28 | Stop reason: HOSPADM

## 2017-02-28 RX ORDER — PROMETHAZINE HYDROCHLORIDE 25 MG/ML
12.5 INJECTION, SOLUTION INTRAMUSCULAR; INTRAVENOUS ONCE AS NEEDED
Status: DISCONTINUED | OUTPATIENT
Start: 2017-02-28 | End: 2017-02-28 | Stop reason: HOSPADM

## 2017-02-28 RX ORDER — LIDOCAINE HYDROCHLORIDE AND EPINEPHRINE BITARTRATE 20; .01 MG/ML; MG/ML
INJECTION, SOLUTION SUBCUTANEOUS AS NEEDED
Status: DISCONTINUED | OUTPATIENT
Start: 2017-02-28 | End: 2017-02-28 | Stop reason: HOSPADM

## 2017-02-28 RX ORDER — PROPARACAINE HYDROCHLORIDE 5 MG/ML
1 SOLUTION/ DROPS OPHTHALMIC ONCE
Status: COMPLETED | OUTPATIENT
Start: 2017-02-28 | End: 2017-02-28

## 2017-02-28 RX ORDER — BALANCED SALT SOLUTION ENRICHED WITH BICARBONATE, DEXTROSE, AND GLUTATHIONE
KIT INTRAOCULAR AS NEEDED
Status: DISCONTINUED | OUTPATIENT
Start: 2017-02-28 | End: 2017-02-28 | Stop reason: HOSPADM

## 2017-02-28 RX ORDER — NALOXONE HCL 0.4 MG/ML
0.2 VIAL (ML) INJECTION AS NEEDED
Status: DISCONTINUED | OUTPATIENT
Start: 2017-02-28 | End: 2017-02-28 | Stop reason: HOSPADM

## 2017-02-28 RX ORDER — DIPHENHYDRAMINE HYDROCHLORIDE 50 MG/ML
12.5 INJECTION INTRAMUSCULAR; INTRAVENOUS
Status: DISCONTINUED | OUTPATIENT
Start: 2017-02-28 | End: 2017-02-28 | Stop reason: SDUPTHER

## 2017-02-28 RX ORDER — FAMOTIDINE 10 MG/ML
20 INJECTION, SOLUTION INTRAVENOUS ONCE
Status: DISCONTINUED | OUTPATIENT
Start: 2017-02-28 | End: 2017-02-28 | Stop reason: HOSPADM

## 2017-02-28 RX ORDER — PROMETHAZINE HYDROCHLORIDE 25 MG/1
25 SUPPOSITORY RECTAL ONCE AS NEEDED
Status: DISCONTINUED | OUTPATIENT
Start: 2017-02-28 | End: 2017-02-28 | Stop reason: HOSPADM

## 2017-02-28 RX ORDER — LABETALOL HYDROCHLORIDE 5 MG/ML
5 INJECTION, SOLUTION INTRAVENOUS
Status: DISCONTINUED | OUTPATIENT
Start: 2017-02-28 | End: 2017-02-28 | Stop reason: SDUPTHER

## 2017-02-28 RX ORDER — PROMETHAZINE HYDROCHLORIDE 25 MG/1
12.5 TABLET ORAL ONCE AS NEEDED
Status: DISCONTINUED | OUTPATIENT
Start: 2017-02-28 | End: 2017-02-28 | Stop reason: HOSPADM

## 2017-02-28 RX ORDER — HYDRALAZINE HYDROCHLORIDE 20 MG/ML
5 INJECTION INTRAMUSCULAR; INTRAVENOUS
Status: DISCONTINUED | OUTPATIENT
Start: 2017-02-28 | End: 2017-02-28 | Stop reason: HOSPADM

## 2017-02-28 RX ORDER — POLYMYXIN B SULFATE AND TRIMETHOPRIM 1; 10000 MG/ML; [USP'U]/ML
1 SOLUTION OPHTHALMIC
Status: COMPLETED | OUTPATIENT
Start: 2017-02-28 | End: 2017-02-28

## 2017-02-28 RX ORDER — LIDOCAINE HYDROCHLORIDE 20 MG/ML
INJECTION, SOLUTION INFILTRATION; PERINEURAL AS NEEDED
Status: DISCONTINUED | OUTPATIENT
Start: 2017-02-28 | End: 2017-02-28 | Stop reason: SURG

## 2017-02-28 RX ORDER — FENTANYL CITRATE 50 UG/ML
100 INJECTION, SOLUTION INTRAMUSCULAR; INTRAVENOUS
Status: DISCONTINUED | OUTPATIENT
Start: 2017-02-28 | End: 2017-02-28 | Stop reason: HOSPADM

## 2017-02-28 RX ORDER — PROPOFOL 10 MG/ML
VIAL (ML) INTRAVENOUS AS NEEDED
Status: DISCONTINUED | OUTPATIENT
Start: 2017-02-28 | End: 2017-02-28 | Stop reason: SURG

## 2017-02-28 RX ORDER — LIDOCAINE HYDROCHLORIDE 10 MG/ML
5 INJECTION, SOLUTION EPIDURAL; INFILTRATION; INTRACAUDAL; PERINEURAL ONCE AS NEEDED
Status: DISCONTINUED | OUTPATIENT
Start: 2017-02-28 | End: 2017-02-28 | Stop reason: HOSPADM

## 2017-02-28 RX ORDER — PROMETHAZINE HYDROCHLORIDE 25 MG/1
25 TABLET ORAL ONCE AS NEEDED
Status: DISCONTINUED | OUTPATIENT
Start: 2017-02-28 | End: 2017-02-28 | Stop reason: HOSPADM

## 2017-02-28 RX ADMIN — TROPICAMIDE 1 DROP: 10 SOLUTION/ DROPS OPHTHALMIC at 12:00

## 2017-02-28 RX ADMIN — LIDOCAINE HYDROCHLORIDE 60 MG: 20 INJECTION, SOLUTION INFILTRATION; PERINEURAL at 13:12

## 2017-02-28 RX ADMIN — PROPOFOL 60 MG: 10 INJECTION, EMULSION INTRAVENOUS at 13:12

## 2017-02-28 RX ADMIN — PHENYLEPHRINE HYDROCHLORIDE 1 DROP: 25 SOLUTION/ DROPS OPHTHALMIC at 11:43

## 2017-02-28 RX ADMIN — POLYMYXIN B SULFATE AND TRIMETHOPRIM 1 DROP: 1; 10000 SOLUTION OPHTHALMIC at 11:43

## 2017-02-28 RX ADMIN — PHENYLEPHRINE HYDROCHLORIDE 1 DROP: 25 SOLUTION/ DROPS OPHTHALMIC at 11:52

## 2017-02-28 RX ADMIN — POLYMYXIN B SULFATE AND TRIMETHOPRIM 1 DROP: 1; 10000 SOLUTION OPHTHALMIC at 12:00

## 2017-02-28 RX ADMIN — TROPICAMIDE 1 DROP: 10 SOLUTION/ DROPS OPHTHALMIC at 11:53

## 2017-02-28 RX ADMIN — POLYMYXIN B SULFATE AND TRIMETHOPRIM 1 DROP: 1; 10000 SOLUTION OPHTHALMIC at 11:53

## 2017-02-28 RX ADMIN — MIDAZOLAM HYDROCHLORIDE 1 MG: 1 INJECTION, SOLUTION INTRAMUSCULAR; INTRAVENOUS at 13:00

## 2017-02-28 RX ADMIN — PROPARACAINE HYDROCHLORIDE 1 DROP: 5 SOLUTION/ DROPS OPHTHALMIC at 11:43

## 2017-02-28 RX ADMIN — MIDAZOLAM HYDROCHLORIDE 1 MG: 1 INJECTION, SOLUTION INTRAMUSCULAR; INTRAVENOUS at 13:10

## 2017-02-28 RX ADMIN — TROPICAMIDE 1 DROP: 10 SOLUTION/ DROPS OPHTHALMIC at 11:43

## 2017-02-28 RX ADMIN — PHENYLEPHRINE HYDROCHLORIDE 1 DROP: 25 SOLUTION/ DROPS OPHTHALMIC at 12:00

## 2017-02-28 RX ADMIN — SODIUM CHLORIDE, POTASSIUM CHLORIDE, SODIUM LACTATE AND CALCIUM CHLORIDE: 600; 310; 30; 20 INJECTION, SOLUTION INTRAVENOUS at 13:00

## 2017-04-06 ENCOUNTER — OFFICE VISIT (OUTPATIENT)
Dept: INTERNAL MEDICINE | Facility: CLINIC | Age: 82
End: 2017-04-06

## 2017-04-06 VITALS
HEIGHT: 71 IN | DIASTOLIC BLOOD PRESSURE: 74 MMHG | SYSTOLIC BLOOD PRESSURE: 122 MMHG | BODY MASS INDEX: 31.64 KG/M2 | WEIGHT: 226 LBS

## 2017-04-06 DIAGNOSIS — I25.10 CORONARY ARTERY DISEASE INVOLVING NATIVE CORONARY ARTERY OF NATIVE HEART WITHOUT ANGINA PECTORIS: ICD-10-CM

## 2017-04-06 DIAGNOSIS — R53.82 CHRONIC FATIGUE: Primary | ICD-10-CM

## 2017-04-06 DIAGNOSIS — I10 ESSENTIAL HYPERTENSION: ICD-10-CM

## 2017-04-06 LAB
BASOPHILS # BLD AUTO: 0.02 10*3/MM3 (ref 0–0.2)
BASOPHILS NFR BLD AUTO: 0.2 % (ref 0–2)
DEPRECATED RDW RBC AUTO: 42.5 FL (ref 37–54)
EOSINOPHIL # BLD AUTO: 0.43 10*3/MM3 (ref 0–0.7)
EOSINOPHIL NFR BLD AUTO: 4.7 % (ref 0–5)
ERYTHROCYTE [DISTWIDTH] IN BLOOD BY AUTOMATED COUNT: 12.7 % (ref 11.5–15)
HCT VFR BLD AUTO: 46.7 % (ref 40.1–51)
HGB BLD-MCNC: 15.5 G/DL (ref 13.7–17.5)
LYMPHOCYTES # BLD AUTO: 1.63 10*3/MM3 (ref 0.8–7)
LYMPHOCYTES NFR BLD AUTO: 17.7 % (ref 10–60)
MCH RBC QN AUTO: 30.8 PG (ref 26–34)
MCHC RBC AUTO-ENTMCNC: 33.2 G/DL (ref 31–37)
MCV RBC AUTO: 92.8 FL (ref 80–100)
MONOCYTES # BLD AUTO: 0.76 10*3/MM3 (ref 0–1)
MONOCYTES NFR BLD AUTO: 8.3 % (ref 0–13)
NEUTROPHILS # BLD AUTO: 6.35 10*3/MM3 (ref 1–11)
NEUTROPHILS NFR BLD AUTO: 69.1 % (ref 30–85)
PLATELET # BLD AUTO: 209 10*3/MM3 (ref 150–450)
PMV BLD AUTO: 9.5 FL (ref 6–12)
RBC # BLD AUTO: 5.03 10*6/MM3 (ref 4.63–6.08)
TSH SERPL DL<=0.05 MIU/L-ACNC: 1.63 MIU/ML (ref 0.4–4.2)
WBC NRBC COR # BLD: 9.19 10*3/MM3 (ref 5–10)

## 2017-04-06 PROCEDURE — 84443 ASSAY THYROID STIM HORMONE: CPT | Performed by: INTERNAL MEDICINE

## 2017-04-06 PROCEDURE — 85025 COMPLETE CBC W/AUTO DIFF WBC: CPT | Performed by: INTERNAL MEDICINE

## 2017-04-06 PROCEDURE — 99213 OFFICE O/P EST LOW 20 MIN: CPT | Performed by: INTERNAL MEDICINE

## 2017-04-06 NOTE — PROGRESS NOTES
"Subjective   Max Shah is a 83 y.o. male here for   Chief Complaint   Patient presents with   • Fatigue   • Hypertension   .    Vitals:    04/06/17 1520   BP: 122/74   BP Location: Left arm   Patient Position: Sitting   Cuff Size: Adult   Weight: 226 lb (103 kg)   Height: 70.5\" (179.1 cm)       Fatigue   This is a chronic problem. The current episode started more than 1 year ago. The problem occurs constantly. The problem has been gradually worsening. Associated symptoms include fatigue. Pertinent negatives include no abdominal pain, anorexia, chest pain, chills, coughing, fever, nausea or sore throat.   Hypertension   This is a chronic problem. The current episode started more than 1 year ago. The problem is unchanged. The problem is controlled. Associated symptoms include malaise/fatigue. Pertinent negatives include no chest pain, palpitations, peripheral edema or shortness of breath.        Encounter Diagnoses   Name Primary?   • Chronic fatigue Yes   • Essential hypertension    • Coronary artery disease involving native coronary artery of native heart without angina pectoris        The following portions of the patient's history were reviewed and updated as appropriate: allergies, current medications, past social history and problem list.    Review of Systems   Constitutional: Positive for fatigue and malaise/fatigue. Negative for chills and fever.   HENT: Negative for sore throat.    Respiratory: Negative for cough, shortness of breath and wheezing.    Cardiovascular: Negative for chest pain, palpitations and leg swelling.   Gastrointestinal: Negative for abdominal pain, anorexia and nausea.   Genitourinary: Positive for enuresis. Negative for dysuria, frequency and hematuria.   Psychiatric/Behavioral: Negative for dysphoric mood and sleep disturbance. The patient is not nervous/anxious.        Objective   Physical Exam   Constitutional: He appears well-developed and well-nourished. No distress. "   Cardiovascular: Normal rate, regular rhythm and normal heart sounds.    Pulmonary/Chest: No respiratory distress. He has no wheezes. He has no rales. He exhibits no tenderness.   Musculoskeletal: He exhibits no edema.   Psychiatric: He has a normal mood and affect. His behavior is normal.   Nursing note and vitals reviewed.      Assessment/Plan   Problem List Items Addressed This Visit        Unprioritized    Hypertension    CAD (coronary artery disease)    Fatigue - Primary    Relevant Orders    Testosterone, Free, Total    TSH (Completed)    CBC Auto Differential (Completed)    Ambulatory Referral to Sleep Medicine       Chronic fatigue (worse?).  He states he sleeps 11-12 hrs, but gets up 4x to urinate (goes right back to sleep).  He requests testosterone test.  He also needs sleep study if not already done. Call if problems persist.   HTN - stable.   CAD - he states he had full cardiac test within 1 yr with dr daysi rodas.  I need these records.     Return for wellness in 3-4 mos

## 2017-04-08 LAB
CONV COMMENT: ABNORMAL
TESTOST FREE SERPL-MCNC: 0.7 PG/ML (ref 6.6–18.1)
TESTOST SERPL-MCNC: 34 NG/DL (ref 348–1197)

## 2017-05-16 ENCOUNTER — TELEPHONE (OUTPATIENT)
Dept: INTERNAL MEDICINE | Facility: CLINIC | Age: 82
End: 2017-05-16

## 2017-05-17 DIAGNOSIS — E29.1 ANDROGEN DEFICIENCY: Primary | ICD-10-CM

## 2017-05-17 RX ORDER — TESTOSTERONE 16.2 MG/G
GEL TRANSDERMAL
Qty: 75 G | Refills: 4 | OUTPATIENT
Start: 2017-05-17 | End: 2018-02-01

## 2017-05-19 ENCOUNTER — TELEPHONE (OUTPATIENT)
Dept: INTERNAL MEDICINE | Facility: CLINIC | Age: 82
End: 2017-05-19

## 2017-06-26 ENCOUNTER — HOSPITAL ENCOUNTER (OUTPATIENT)
Dept: SLEEP MEDICINE | Facility: HOSPITAL | Age: 82
Discharge: HOME | End: 2017-06-26
Attending: INTERNAL MEDICINE | Admitting: INTERNAL MEDICINE

## 2017-06-26 ENCOUNTER — TRANSCRIBE ORDERS (OUTPATIENT)
Dept: PULMONOLOGY | Facility: HOSPITAL | Age: 82
End: 2017-06-26

## 2017-06-26 DIAGNOSIS — R53.82 CHRONIC FATIGUE: ICD-10-CM

## 2017-06-26 DIAGNOSIS — G47.33 OSA (OBSTRUCTIVE SLEEP APNEA): Primary | ICD-10-CM

## 2017-06-26 PROCEDURE — G0463 HOSPITAL OUTPT CLINIC VISIT: HCPCS

## 2017-06-27 NOTE — PROGRESS NOTES
DATE OF SERVICE: 06/26/2017    REFERRING PHYSICIAN: Brianne Solomon MD    HISTORY OF PRESENT ILLNESS: As you recall, a very pleasant 83-year-old gentleman here for followup on obstructive sleep apnea syndrome. The patient states was diagnosed with sleep apnea 30 years ago in [place], [place]. At that time he is not sure if he was offered treatment. Apparently he has snoring with fatigue. He was some better with deviated nasal septum correction. However, the symptoms have returned. Denies any alcohol abuse. No caffeine abuse. No parasomnias reported. Weight gain of 10 pounds. Frequent awakenings at night. Sleep schedule: Time to bed 10, gets up at 8, gets about 10 hours of sleep and still feels tired. No night shift work.     PAST MEDICAL HISTORY: Hypertension.     MEDICATIONS:  1.  Diltiazem.    2.  Losartan.    3.  Simvastatin.    4.  Aspirin.     FAMILY HISTORY: Positive for bone cancer.     SOCIAL HISTORY: No tobacco or alcohol abuse. No caffeine abuse.     REVIEW OF SYSTEMS: Completely negative.     Eckert was 2 out of 24, within normal limits.     PHYSICAL EXAMINATION:  GENERAL: Awake, alert, in no distress.   HEENT: Mallampati between III and IV.   LUNGS: Clear.   CARDIOVASCULAR: Regular rate and rhythm.   ABDOMEN: Benign.   EXTREMITIES: No edema.   CENTRAL NERVOUS SYSTEM: No deficits.     IMPRESSION:   1.  Obstructive sleep apnea syndrome.    2.  Hypertension.    3.  Hypersomnia.     RECOMMENDATIONS: Based on clinical presentation, sleep-disordered breathing and sleep apnea are suspected. I discussed with patient pathophysiology and consequence of untreated sleep apnea. Patient is agreeable, wishing to proceed for a sleep study. Sleep hygiene measures were discussed in detail. Will follow up and make further recommendations after reviewing the sleep study.       MYA Alexander:augusto  D:   06/26/2017 17:37:05  T:   06/26/2017 23:45:10  Job ID:   [id number]  Document ID:   [id number]  cc:   Brianne SANTAMARIA  POLLO Solomon.

## 2017-07-12 ENCOUNTER — HOSPITAL ENCOUNTER (OUTPATIENT)
Dept: SLEEP MEDICINE | Facility: HOSPITAL | Age: 82
Discharge: HOME | End: 2017-07-12
Attending: INTERNAL MEDICINE | Admitting: INTERNAL MEDICINE

## 2017-07-12 DIAGNOSIS — G47.33 OSA (OBSTRUCTIVE SLEEP APNEA): ICD-10-CM

## 2017-07-12 PROCEDURE — G0398 HOME SLEEP TEST/TYPE 2 PORTA: HCPCS

## 2017-08-01 ENCOUNTER — OFFICE VISIT (OUTPATIENT)
Dept: INTERNAL MEDICINE | Facility: CLINIC | Age: 82
End: 2017-08-01

## 2017-08-01 VITALS
TEMPERATURE: 97.1 F | SYSTOLIC BLOOD PRESSURE: 132 MMHG | WEIGHT: 219.4 LBS | DIASTOLIC BLOOD PRESSURE: 84 MMHG | RESPIRATION RATE: 17 BRPM | BODY MASS INDEX: 31.41 KG/M2 | OXYGEN SATURATION: 93 % | HEIGHT: 70 IN | HEART RATE: 75 BPM

## 2017-08-01 DIAGNOSIS — I25.10 CORONARY ARTERY DISEASE INVOLVING NATIVE CORONARY ARTERY OF NATIVE HEART WITHOUT ANGINA PECTORIS: ICD-10-CM

## 2017-08-01 DIAGNOSIS — E78.49 OTHER HYPERLIPIDEMIA: ICD-10-CM

## 2017-08-01 DIAGNOSIS — Z00.00 MEDICARE ANNUAL WELLNESS VISIT, SUBSEQUENT: Primary | ICD-10-CM

## 2017-08-01 DIAGNOSIS — Z23 NEED FOR VACCINATION: ICD-10-CM

## 2017-08-01 DIAGNOSIS — I10 ESSENTIAL HYPERTENSION: ICD-10-CM

## 2017-08-01 DIAGNOSIS — R53.82 CHRONIC FATIGUE: ICD-10-CM

## 2017-08-01 LAB
ALBUMIN SERPL-MCNC: 3.83 G/DL (ref 3.4–4.6)
ALBUMIN/GLOB SERPL: 1.1 G/DL
ALP SERPL-CCNC: 81 U/L (ref 46–116)
ALT SERPL W P-5'-P-CCNC: 22 U/L (ref 16–63)
ANION GAP SERPL CALCULATED.3IONS-SCNC: 12 MMOL/L
AST SERPL-CCNC: 20 U/L (ref 7–37)
BILIRUB SERPL-MCNC: 1.2 MG/DL (ref 0.2–1)
BUN BLD-MCNC: 24 MG/DL (ref 6–22)
BUN/CREAT SERPL: 27 (ref 7–25)
CALCIUM SPEC-SCNC: 9.7 MG/DL (ref 8.6–10.5)
CHLORIDE SERPL-SCNC: 103 MMOL/L (ref 95–107)
CHOLEST SERPL-MCNC: 143 MG/DL (ref 0–200)
CO2 SERPL-SCNC: 26 MMOL/L (ref 23–32)
CREAT BLD-MCNC: 0.89 MG/DL (ref 0.7–1.3)
GFR SERPL CREATININE-BSD FRML MDRD: 82 ML/MIN/1.73
GLOBULIN UR ELPH-MCNC: 3.5 GM/DL
GLUCOSE BLD-MCNC: 101 MG/DL (ref 70–100)
HDLC SERPL-MCNC: 57 MG/DL (ref 40–81)
LDLC SERPL CALC-MCNC: 73 MG/DL (ref 0–100)
LDLC/HDLC SERPL: 1.28 {RATIO}
POTASSIUM BLD-SCNC: 4.7 MMOL/L (ref 3.3–5.3)
PROT SERPL-MCNC: 7.3 G/DL (ref 6.3–8.4)
SODIUM BLD-SCNC: 141 MMOL/L (ref 136–145)
TRIGL SERPL-MCNC: 64 MG/DL (ref 0–150)
VLDLC SERPL-MCNC: 12.8 MG/DL

## 2017-08-01 PROCEDURE — G0439 PPPS, SUBSEQ VISIT: HCPCS | Performed by: INTERNAL MEDICINE

## 2017-08-01 PROCEDURE — 80053 COMPREHEN METABOLIC PANEL: CPT | Performed by: INTERNAL MEDICINE

## 2017-08-01 PROCEDURE — 36415 COLL VENOUS BLD VENIPUNCTURE: CPT | Performed by: INTERNAL MEDICINE

## 2017-08-01 PROCEDURE — 99214 OFFICE O/P EST MOD 30 MIN: CPT | Performed by: INTERNAL MEDICINE

## 2017-08-01 PROCEDURE — 80061 LIPID PANEL: CPT | Performed by: INTERNAL MEDICINE

## 2017-08-01 PROCEDURE — G0009 ADMIN PNEUMOCOCCAL VACCINE: HCPCS | Performed by: INTERNAL MEDICINE

## 2017-08-01 PROCEDURE — 90670 PCV13 VACCINE IM: CPT | Performed by: INTERNAL MEDICINE

## 2017-08-01 NOTE — PATIENT INSTRUCTIONS
Medicare Wellness  Personal Prevention Plan of Service     Date of Office Visit:  2017  Encounter Provider:  Brianne Solomon MD  Place of Service:  [facility] INTERNAL MEDICINE  Patient Name: Max Shah  :  1934    As part of the Medicare Wellness portion of your visit today, we are providing you with this personalized preventive plan of services (PPPS). This plan is based upon recommendations of the [place] Preventive Services Task Force (USPSTF) and the Advisory Committee on Immunization Practices (ACIP).    This lists the preventive care services that should be considered, and provides dates of when you are due. Items listed as completed are up-to-date and do not require any further intervention.    Health Maintenance   Topic Date Due   • TDAP/TD VACCINES (1 - Tdap) 1953   • INFLUENZA VACCINE  2017   • LIPID PANEL  2018   • MEDICARE ANNUAL WELLNESS  2018   • PNEUMOCOCCAL VACCINES (65+ LOW/MEDIUM RISK)  Completed   • ZOSTER VACCINE  Completed       Orders Placed This Encounter   Procedures   • Lipid Panel     Standing Status:   Future     Standing Expiration Date:   2018   • Comprehensive Metabolic Panel     Standing Status:   Future     Standing Expiration Date:   2018       No Follow-up on file.

## 2017-08-01 NOTE — PROGRESS NOTES
QUICK REFERENCE INFORMATION:  The ABCs of the Annual Wellness Visit    Subsequent Medicare Wellness Visit    HEALTH RISK ASSESSMENT    1934    Recent Hospitalizations:  No hospitalization(s) within the last year..        Current Medical Providers:  Patient Care Team:  Brianne Solomon MD as PCP - General (Internal Medicine)  Brianne Solomon MD as PCP - Claims Attributed  Lulú Bettencourt MD as Consulting Physician (Cardiology)  Bear Boyd MD as Consulting Physician (Otolaryngology)  Skip Malik MD as Consulting Physician (Gastroenterology)  Clarke Bettencourt MD as Consulting Physician (Dermatology)  Audrey Elena MD as Consulting Physician (Vascular Surgery)        Smoking Status:  History   Smoking Status   • Never Smoker   Smokeless Tobacco   • Never Used       Alcohol Consumption:  History   Alcohol Use   • Yes     Comment: moderate       Depression Screen:   PHQ-9 Depression Screening 8/1/2017   Little interest or pleasure in doing things 0   Feeling down, depressed, or hopeless 0   Trouble falling or staying asleep, or sleeping too much 0   Feeling tired or having little energy 0   Poor appetite or overeating 0   Feeling bad about yourself - or that you are a failure or have let yourself or your family down 0   Trouble concentrating on things, such as reading the newspaper or watching television 0   Moving or speaking so slowly that other people could have noticed. Or the opposite - being so fidgety or restless that you have been moving around a lot more than usual 0   Thoughts that you would be better off dead, or of hurting yourself in some way 0   PHQ-9 Total Score 0       Health Habits and Functional and Cognitive Screening:  Functional & Cognitive Status 8/1/2017   Do you have difficulty preparing food and eating? No   Do you have difficulty bathing yourself? No   Do you have difficulty getting dressed? No   Do you have difficulty using the toilet? No   Do you have difficulty  moving around from place to place? No   In the past year have you fallen or experienced a near fall? No   Do you need help using the phone?  No   Are you deaf or do you have serious difficulty hearing?  Yes   Do you need help with transportation? No   Do you need help shopping? No   Do you need help preparing meals?  No   Do you need help with housework?  No   Do you need help with laundry? No   Do you need help taking your medications? No   Do you need help managing money? No   Do you have difficulty concentrating, remembering or making decisions? No              Does the patient have evidence of cognitive impairment? No    Aspirin use counseling: Taking ASA appropriately as indicated      Recent Lab Results:  CMP:  Lab Results   Component Value Date    BUN 19 01/31/2017    CREATININE 1.00 01/31/2017    EGFRIFNONA 72 01/31/2017    EGFRIFAFRI  09/06/2016      Comment:      <15 Indicative of kidney failure.    BCR 19.0 01/31/2017     01/31/2017    K 4.5 01/31/2017    CO2 28.0 01/31/2017    CALCIUM 9.2 01/31/2017    ALBUMIN 3.85 01/31/2017    LABIL2 1.1 01/31/2017    BILITOT 0.9 01/31/2017    ALKPHOS 88 01/31/2017    AST 16 01/31/2017    ALT 19 01/31/2017     Lipid Panel:  Lab Results   Component Value Date    CHOL 125 01/31/2017    TRIG 47 01/31/2017    HDL 61 01/31/2017    VLDL 9.4 01/31/2017    LDLCALC 55 01/31/2017    LDLHDL 0.90 01/31/2017     HbA1c:       Visual Acuity:  No exam data present    Age-appropriate Screening Schedule:  Refer to the list below for future screening recommendations based on patient's age, sex and/or medical conditions. Orders for these recommended tests are listed in the plan section. The patient has been provided with a written plan.    Health Maintenance   Topic Date Due   • TDAP/TD VACCINES (1 - Tdap) 03/01/1953   • INFLUENZA VACCINE  08/01/2017   • LIPID PANEL  01/31/2018   • PNEUMOCOCCAL VACCINES (65+ LOW/MEDIUM RISK)  Completed   • ZOSTER VACCINE  Completed        Subjective    History of Present Illness    Max Shah is a 83 y.o. male who presents for an Subsequent Wellness Visit.    The following portions of the patient's history were reviewed and updated as appropriate: allergies, current medications, past family history, past medical history, past social history, past surgical history and problem list.    Outpatient Medications Prior to Visit   Medication Sig Dispense Refill   • aspirin 81 MG EC tablet Take 81 mg by mouth daily.     • diltiazem (TIAZAC) 120 MG 24 hr capsule Take 120 mg by mouth.     • losartan (COZAAR) 100 MG tablet TAKE 1 TABLET EVERY DAY 90 tablet 3   • Multiple Vitamin (MULTI-VITAMIN DAILY PO) Take 1 tablet by mouth Daily.     • simvastatin (ZOCOR) 20 MG tablet Take 20 mg by mouth.     • Testosterone (ANDROGEL PUMP) 20.25 MG/ACT (1.62%) gel 40.5mg (2 pump actuations or 2.5g gel) daily to shoulder/upper arm or abd 75 g 4     No facility-administered medications prior to visit.        Patient Active Problem List   Diagnosis   • Hypertension   • CAD (coronary artery disease)   • GERD (gastroesophageal reflux disease)   • Fatigue   • Hand pain, right   • Stenosis of carotid artery   • Diastolic dysfunction   • Hyperlipidemia   • Pulmonary hypertension       Advance Care Planning:  has an advance directive - a copy HAS NOT been provided. Have asked the patient to send this to us to add to record.    Identification of Risk Factors:  Risk factors include: cardiovascular risk, inactivity, increased fall risk and hearing limitations.    Review of Systems    Compared to one year ago, the patient feels his physical health is the same.  Compared to one year ago, the patient feels his mental health is the same.    Objective     Physical Exam    Vitals:    08/01/17 1053   BP: 132/84   BP Location: Left arm   Patient Position: Sitting   Cuff Size: Adult   Pulse: 75   Resp: 17   Temp: 97.1 °F (36.2 °C)   TempSrc: Temporal Artery    SpO2: 93%   Weight: 219 lb 6.4 oz (99.5 kg)  "  Height: 69.5\" (176.5 cm)  Comment: Updated height   PainSc: 0-No pain       Body mass index is 31.94 kg/(m^2).  Discussed the patient's BMI with him. The BMI is above average; BMI management plan is completed.    Assessment/Plan   Patient Self-Management and Personalized Health Advice  The patient has been provided with information about: diet, exercise, weight management, prevention of cardiac or vascular disease, the relationship between weight and GERD, fall prevention and designing advance directives and preventive services including:   · Advance directive, Exercise counseling provided, Fall Risk assessment done, Fall Risk plan of care done, Influenza vaccine, Nutrition counseling provided, Pneumococcal vaccine , Screening for AAA, referral for ultrasound placed, TdaP vaccine.    Visit Diagnoses:    ICD-10-CM ICD-9-CM   1. Medicare annual wellness visit, subsequent Z00.00 V70.0   2. Essential hypertension I10 401.9   3. Other hyperlipidemia E78.4 272.4   4. Chronic fatigue R53.82 780.79   5. Coronary artery disease involving native coronary artery of native heart without angina pectoris I25.10 414.01   6. Need for vaccination Z23 V05.9       Orders Placed This Encounter   Procedures   • Lipid Panel     Standing Status:   Future     Standing Expiration Date:   8/1/2018   • Comprehensive Metabolic Panel     Standing Status:   Future     Standing Expiration Date:   8/1/2018       Outpatient Encounter Prescriptions as of 8/1/2017   Medication Sig Dispense Refill   • aspirin 81 MG EC tablet Take 81 mg by mouth daily.     • diltiazem (TIAZAC) 120 MG 24 hr capsule Take 120 mg by mouth.     • losartan (COZAAR) 100 MG tablet TAKE 1 TABLET EVERY DAY 90 tablet 3   • Multiple Vitamin (MULTI-VITAMIN DAILY PO) Take 1 tablet by mouth Daily.     • simvastatin (ZOCOR) 20 MG tablet Take 20 mg by mouth.     • Testosterone (ANDROGEL PUMP) 20.25 MG/ACT (1.62%) gel 40.5mg (2 pump actuations or 2.5g gel) daily to shoulder/upper arm " or abd 75 g 4     Facility-Administered Encounter Medications as of 8/1/2017   Medication Dose Route Frequency Provider Last Rate Last Dose   • pneumococcal conj. 13-valent (PREVNAR-13) vaccine 0.5 mL  0.5 mL Intramuscular Once Brianne Solomon MD           Reviewed use of high risk medication in the elderly: yes  Reviewed for potential of harmful drug interactions in the elderly: yes    Follow Up:  No Follow-up on file.     An After Visit Summary and PPPS with all of these plans were given to the patient.

## 2017-08-01 NOTE — PROGRESS NOTES
"Subjective   Max Shah is a 83 y.o. male here for   Chief Complaint   Patient presents with   • Subsequent Wellness Visit   .    Vitals:    08/01/17 1053   BP: 132/84   BP Location: Left arm   Patient Position: Sitting   Cuff Size: Adult   Pulse: 75   Resp: 17   Temp: 97.1 °F (36.2 °C)   TempSrc: Temporal Artery    SpO2: 93%   Weight: 219 lb 6.4 oz (99.5 kg)   Height: 69.5\" (176.5 cm)       Hypertension   This is a chronic problem. The current episode started more than 1 year ago. The problem is unchanged. The problem is controlled. Associated symptoms include malaise/fatigue. Pertinent negatives include no chest pain, palpitations, peripheral edema or shortness of breath.   Hyperlipidemia   This is a chronic problem. The current episode started more than 1 year ago. The problem is controlled. Recent lipid tests were reviewed and are normal. He has no history of diabetes. Pertinent negatives include no chest pain or shortness of breath.        Encounter Diagnoses   Name Primary?   • Medicare annual wellness visit, subsequent Yes   • Essential hypertension    • Other hyperlipidemia    • Chronic fatigue    • Coronary artery disease involving native coronary artery of native heart without angina pectoris    • Need for vaccination        The following portions of the patient's history were reviewed and updated as appropriate: allergies, current medications, past social history and problem list.    Review of Systems   Constitutional: Positive for fatigue and malaise/fatigue. Negative for chills and fever.   Respiratory: Positive for cough. Negative for shortness of breath and wheezing.    Cardiovascular: Negative for chest pain, palpitations and leg swelling.   Allergic/Immunologic: Positive for environmental allergies.   Psychiatric/Behavioral: Negative for dysphoric mood and sleep disturbance. The patient is not nervous/anxious.        Objective   Physical Exam   Constitutional: He appears well-developed and " well-nourished. No distress.   Cardiovascular: Normal rate, regular rhythm and normal heart sounds.    Pulmonary/Chest: No respiratory distress. He has no wheezes. He has no rales. He exhibits no tenderness.   Musculoskeletal: He exhibits no edema.   Psychiatric: He has a normal mood and affect. His behavior is normal.   Nursing note and vitals reviewed.      Assessment/Plan   Problem List Items Addressed This Visit        Unprioritized    Hypertension    Relevant Orders    Lipid Panel    Comprehensive Metabolic Panel    CAD (coronary artery disease)    Fatigue    Hyperlipidemia    Relevant Orders    Lipid Panel    Comprehensive Metabolic Panel      Other Visit Diagnoses     Medicare annual wellness visit, subsequent    -  Primary    Need for vaccination        Relevant Medications    pneumococcal conj. 13-valent (PREVNAR-13) vaccine 0.5 mL (Completed) (Start on 8/1/2017 12:00 PM)       Fatigue - home sleep study results pending.  He did not start testosterone yet (too costly).  Will consider testosterone (low dose) if normal sleep study.  He will call me when ready.  We discussed the need for CPAP if he has obstructive sleep apnea (increased risk of heart attack, stroke,etc).   HTN - call if bp over 140/90.   High chol - need diet & exercise.  Labs today.   CAD - continue f/u with cardiologist.   Very hard of hearing (no hearing aids today) - need to wear hearing aids daily.  Keep f/u appt with audiologist.      Wellness today.   Need daily strengthening & balance exercises (shown today).    Need screening for AAA & carotid disease.  Information given today.   Need Tdap & influenza vaccines at pharmacy this autumn.

## 2017-08-09 ENCOUNTER — TELEPHONE (OUTPATIENT)
Dept: SLEEP MEDICINE | Facility: HOSPITAL | Age: 82
End: 2017-08-09

## 2017-08-09 NOTE — TELEPHONE ENCOUNTER
Spoke with patient about his sleep study results and doctor wants him to come in for titration study with possible oxygen titration.  He is scheduled for September 10 th.

## 2017-08-16 ENCOUNTER — TRANSCRIBE ORDERS (OUTPATIENT)
Dept: PULMONOLOGY | Facility: HOSPITAL | Age: 82
End: 2017-08-16

## 2017-08-16 DIAGNOSIS — G47.33 OSA (OBSTRUCTIVE SLEEP APNEA): Primary | ICD-10-CM

## 2017-08-16 DIAGNOSIS — G47.34 SLEEP RELATED HYPOXIA: ICD-10-CM

## 2017-08-18 DIAGNOSIS — G47.33 OSA (OBSTRUCTIVE SLEEP APNEA): Primary | ICD-10-CM

## 2017-09-10 ENCOUNTER — HOSPITAL ENCOUNTER (OUTPATIENT)
Dept: SLEEP MEDICINE | Facility: HOSPITAL | Age: 82
Discharge: HOME | End: 2017-09-10
Admitting: INTERNAL MEDICINE

## 2017-09-10 DIAGNOSIS — G47.34 SLEEP RELATED HYPOXIA: ICD-10-CM

## 2017-09-10 DIAGNOSIS — G47.33 OSA (OBSTRUCTIVE SLEEP APNEA): ICD-10-CM

## 2017-09-10 PROCEDURE — 95811 POLYSOM 6/>YRS CPAP 4/> PARM: CPT

## 2017-09-15 RX ORDER — SIMVASTATIN 20 MG
TABLET ORAL
Qty: 90 TABLET | Refills: 0 | Status: SHIPPED | OUTPATIENT
Start: 2017-09-15 | End: 2017-12-17 | Stop reason: SDUPTHER

## 2017-09-18 ENCOUNTER — TELEPHONE (OUTPATIENT)
Dept: SLEEP MEDICINE | Facility: HOSPITAL | Age: 82
End: 2017-09-18

## 2017-09-18 NOTE — TELEPHONE ENCOUNTER
Pt called with questions about when someone would call him, what kind of mask he should get and what DME to go to.. , he stated that he had dealt with [facility] before and would prefer to stay with them unless we knew someone better.. I told him someone from the office should be calling him with results when the Dr has resulted titration study.

## 2017-09-26 ENCOUNTER — TELEPHONE (OUTPATIENT)
Dept: SLEEP MEDICINE | Facility: HOSPITAL | Age: 82
End: 2017-09-26

## 2017-11-06 ENCOUNTER — OFFICE VISIT (OUTPATIENT)
Dept: SLEEP MEDICINE | Facility: HOSPITAL | Age: 82
End: 2017-11-06
Attending: INTERNAL MEDICINE

## 2017-11-06 VITALS
SYSTOLIC BLOOD PRESSURE: 141 MMHG | HEART RATE: 76 BPM | DIASTOLIC BLOOD PRESSURE: 73 MMHG | HEIGHT: 71 IN | BODY MASS INDEX: 31.5 KG/M2 | WEIGHT: 225 LBS

## 2017-11-06 DIAGNOSIS — Z99.89 OSA ON CPAP: Primary | ICD-10-CM

## 2017-11-06 DIAGNOSIS — G47.33 OSA ON CPAP: Primary | ICD-10-CM

## 2017-11-06 PROCEDURE — G0463 HOSPITAL OUTPT CLINIC VISIT: HCPCS

## 2017-11-06 RX ORDER — LOSARTAN POTASSIUM 100 MG/1
TABLET ORAL
Qty: 90 TABLET | Refills: 3 | Status: SHIPPED | OUTPATIENT
Start: 2017-11-06 | End: 2018-10-29 | Stop reason: SDUPTHER

## 2017-11-06 NOTE — PROGRESS NOTES
"[facility] PULMONARY [facility]         Dr Markel Barraza  [unfilled]  Patient Care Team:  Brianne Solomon MD as PCP - General (Internal Medicine)  Brianne Solomon MD as PCP - Claims Attributed  Lulú Radha Bettencourt MD as Consulting Physician (Cardiology)  Bear Boyd MD as Consulting Physician (Otolaryngology)  Skip Malik MD as Consulting Physician (Gastroenterology)  Clarke Bettencourt MD as Consulting Physician (Dermatology)  Audrey Elena MD as Consulting Physician (Vascular Surgery)    Chief Complaint:marie with htn    Interval History: Patient here for follow-up.  Initial home sleep study showed ordered obstructive sleep apnea syndrome apnea index 26 events per hour.  Today compliance is 100% with AHI and leak both excellent.  Positive benefit from CPAP.  No tobacco or alcohol abuse.  Currently has a nasal mask with@for 6 out of 24 within normal limits.    REVIEW OF SYSTEMS:   CARDIOVASCULAR: No chest pain, chest pressure or chest discomfort. No palpitations or edema.   RESPIRATORY: No shortness of breath, cough or sputum.   GASTROINTESTINAL: No anorexia, nausea, vomiting or diarrhea. No abdominal pain or blood.   HEMATOLOGIC: No bleeding or bruising.     Ventilator/Non-Invasive Ventilation Settings     None            Vital Signs  Heart Rate:  [76] 76  BP: (141)/(73) 141/73  [unfilled]  Flowsheet Rows         First Filed Value    Admission Height  71\" (180.3 cm) Documented at 11/06/2017 1116    Admission Weight  225 lb (102 kg) Documented at 11/06/2017 1116          Physical Exam:   General Appearance:    Alert, cooperative, in no acute distress  ENT Mallampati 3    Lungs:     Clear to auscultation,respirations regular, even and                  unlabored    Heart:    Regular rhythm and normal rate, normal S1 and S2, no            murmur, no gallop, no rub, no click   Chest Wall:    No abnormalities observed   Abdomen:     Normal bowel sounds, no masses, no organomegaly, soft        " non-tender, non-distended, no guarding, no rebound                tenderness   Extremities:   Moves all extremities well, no edema, no cyanosis, no             redness     Results Review:                                          I reviewed the patient's new clinical results.  I personally viewed and interpreted the patient's CXR        Medication Review:   Obstructive sleep apnea syndrome comorbidities of hypertension      ASSESSMENT:   Reviewed download with the patient.  Patient having adequate benefit from CPAP.  Excellent compliance AHI and leak  Continue CPAP 10 cm  Sleep hygiene measures  Reviewed home sleep study and CPAP titration results  Follow-up in 1 year      PLAN:    Markel Barraza MD  11/06/17  11:44 AM

## 2017-12-18 RX ORDER — SIMVASTATIN 20 MG
TABLET ORAL
Qty: 90 TABLET | Refills: 3 | Status: SHIPPED | OUTPATIENT
Start: 2017-12-18 | End: 2018-12-05 | Stop reason: SDUPTHER

## 2018-02-01 ENCOUNTER — OFFICE VISIT (OUTPATIENT)
Dept: INTERNAL MEDICINE | Facility: CLINIC | Age: 83
End: 2018-02-01

## 2018-02-01 VITALS
DIASTOLIC BLOOD PRESSURE: 72 MMHG | BODY MASS INDEX: 32.61 KG/M2 | SYSTOLIC BLOOD PRESSURE: 132 MMHG | WEIGHT: 227.8 LBS | HEIGHT: 70 IN

## 2018-02-01 DIAGNOSIS — E78.49 OTHER HYPERLIPIDEMIA: ICD-10-CM

## 2018-02-01 DIAGNOSIS — I10 ESSENTIAL HYPERTENSION: Primary | ICD-10-CM

## 2018-02-01 DIAGNOSIS — G47.33 OBSTRUCTIVE SLEEP APNEA SYNDROME: ICD-10-CM

## 2018-02-01 DIAGNOSIS — K21.9 GASTROESOPHAGEAL REFLUX DISEASE, ESOPHAGITIS PRESENCE NOT SPECIFIED: ICD-10-CM

## 2018-02-01 LAB
ALBUMIN SERPL-MCNC: 4 G/DL (ref 3.5–5.2)
ALBUMIN/GLOB SERPL: 1.2 G/DL
ALP SERPL-CCNC: 70 U/L (ref 39–117)
ALT SERPL W P-5'-P-CCNC: 14 U/L (ref 1–41)
ANION GAP SERPL CALCULATED.3IONS-SCNC: 11.8 MMOL/L
AST SERPL-CCNC: 17 U/L (ref 1–40)
BASOPHILS # BLD AUTO: 0.02 10*3/MM3 (ref 0–0.2)
BASOPHILS NFR BLD AUTO: 0.3 % (ref 0–1.5)
BILIRUB SERPL-MCNC: 1.1 MG/DL (ref 0.1–1.2)
BILIRUB UR QL STRIP: NEGATIVE
BUN BLD-MCNC: 25 MG/DL (ref 8–23)
BUN/CREAT SERPL: 27.5 (ref 7–25)
CALCIUM SPEC-SCNC: 9.5 MG/DL (ref 8.6–10.5)
CHLORIDE SERPL-SCNC: 102 MMOL/L (ref 98–107)
CHOLEST SERPL-MCNC: 129 MG/DL (ref 0–200)
CLARITY UR: CLEAR
CO2 SERPL-SCNC: 26.2 MMOL/L (ref 22–29)
COLOR UR: YELLOW
CREAT BLD-MCNC: 0.91 MG/DL (ref 0.76–1.27)
EOSINOPHIL # BLD AUTO: 0.42 10*3/MM3 (ref 0–0.7)
EOSINOPHIL # BLD AUTO: 5.9 % (ref 0.3–6.2)
ERYTHROCYTE [DISTWIDTH] IN BLOOD BY AUTOMATED COUNT: 13.1 % (ref 11.5–14.5)
GFR SERPL CREATININE-BSD FRML MDRD: 80 ML/MIN/1.73
GLOBULIN UR ELPH-MCNC: 3.4 GM/DL
GLUCOSE BLD-MCNC: 95 MG/DL (ref 65–99)
GLUCOSE UR STRIP-MCNC: NEGATIVE MG/DL
HCT VFR BLD AUTO: 46.4 % (ref 40.4–52.2)
HDLC SERPL-MCNC: 52 MG/DL (ref 40–60)
HGB BLD-MCNC: 15.4 G/DL (ref 13.7–17.6)
HGB UR QL STRIP.AUTO: NEGATIVE
IMM GRANULOCYTES # BLD: 0.04 10*3/MM3 (ref 0–0.03)
IMM GRANULOCYTES NFR BLD: 0.6 % (ref 0–0.5)
KETONES UR QL STRIP: NEGATIVE
LDLC SERPL CALC-MCNC: 65 MG/DL (ref 0–100)
LDLC/HDLC SERPL: 1.24 {RATIO}
LEUKOCYTE ESTERASE UR QL STRIP.AUTO: NEGATIVE
LYMPHOCYTES # BLD AUTO: 1.43 10*3/MM3 (ref 0.9–4.8)
LYMPHOCYTES NFR BLD AUTO: 20.2 % (ref 19.6–45.3)
MCH RBC QN AUTO: 31.3 PG (ref 27–32.7)
MCHC RBC AUTO-ENTMCNC: 33.2 G/DL (ref 32.6–36.4)
MCV RBC AUTO: 94.3 FL (ref 79.8–96.2)
MONOCYTES # BLD AUTO: 0.7 10*3/MM3 (ref 0.2–1.2)
MONOCYTES NFR BLD AUTO: 9.9 % (ref 5–12)
NEUTROPHILS # BLD AUTO: 4.47 10*3/MM3 (ref 1.9–8.1)
NEUTROPHILS NFR BLD AUTO: 63.1 % (ref 42.7–76)
NITRITE UR QL STRIP: NEGATIVE
PH UR STRIP.AUTO: 6 [PH] (ref 5–8)
PLATELET # BLD AUTO: 171 10*3/MM3 (ref 140–500)
POTASSIUM BLD-SCNC: 4.4 MMOL/L (ref 3.5–5.2)
PROT SERPL-MCNC: 7.4 G/DL (ref 6–8.5)
PROT UR QL STRIP: NEGATIVE
RBC # BLD AUTO: 4.92 10*6/MM3 (ref 4.6–6)
SODIUM BLD-SCNC: 140 MMOL/L (ref 136–145)
SP GR UR STRIP: 1.02 (ref 1–1.03)
TRIGL SERPL-MCNC: 62 MG/DL (ref 0–150)
UROBILINOGEN UR QL STRIP: NORMAL
VLDLC SERPL-MCNC: 12.4 MG/DL (ref 5–40)
WBC # BLD AUTO: 7.08 10*3/MM3 (ref 4.5–10.7)

## 2018-02-01 PROCEDURE — 99213 OFFICE O/P EST LOW 20 MIN: CPT | Performed by: INTERNAL MEDICINE

## 2018-02-01 PROCEDURE — 80061 LIPID PANEL: CPT | Performed by: INTERNAL MEDICINE

## 2018-02-01 PROCEDURE — 81003 URINALYSIS AUTO W/O SCOPE: CPT | Performed by: INTERNAL MEDICINE

## 2018-02-01 PROCEDURE — 80053 COMPREHEN METABOLIC PANEL: CPT | Performed by: INTERNAL MEDICINE

## 2018-02-01 RX ORDER — NITROGLYCERIN 0.4 MG/1
0.4 TABLET SUBLINGUAL AS NEEDED
COMMUNITY
Start: 2017-08-10 | End: 2018-08-10

## 2018-02-01 NOTE — PROGRESS NOTES
"Subjective   Max Shah is a 83 y.o. male here for   Chief Complaint   Patient presents with   • Hyperlipidemia     6 month follow-up   • Hypertension   .    Vitals:    02/01/18 1041 02/01/18 1123   BP: 138/76 132/72   BP Location: Left arm    Patient Position: Sitting    Cuff Size: Adult    Weight: 103 kg (227 lb 12.8 oz)    Height: 176.5 cm (69.5\")        Body mass index is 33.16 kg/(m^2).    Hyperlipidemia   This is a chronic problem. The current episode started more than 1 year ago. The problem is controlled. Recent lipid tests were reviewed and are normal. He has no history of diabetes. Pertinent negatives include no chest pain or shortness of breath.   Hypertension   This is a chronic problem. The current episode started more than 1 year ago. The problem is unchanged. The problem is controlled. Pertinent negatives include no chest pain, palpitations, peripheral edema or shortness of breath.        The following portions of the patient's history were reviewed and updated as appropriate: allergies, current medications, past social history and problem list.    Review of Systems   Constitutional: Positive for fatigue (better with cpap). Negative for chills and fever.   Respiratory: Negative for cough, shortness of breath and wheezing.    Cardiovascular: Negative for chest pain, palpitations and leg swelling.   Psychiatric/Behavioral: Negative for dysphoric mood and sleep disturbance. The patient is not nervous/anxious.        Objective   Physical Exam   Constitutional: He appears well-developed and well-nourished. No distress.   Cardiovascular: Normal rate, regular rhythm and normal heart sounds.    Pulmonary/Chest: No respiratory distress. He has no wheezes. He has no rales. He exhibits no tenderness.   Musculoskeletal: He exhibits no edema.   Psychiatric: He has a normal mood and affect. His behavior is normal.   Nursing note and vitals reviewed.      Assessment/Plan   Diagnoses and all orders for this " visit:    Essential hypertension  Comments:  stable - call if bp over 140/90  Orders:  -     Comprehensive Metabolic Panel; Future  -     Lipid Panel; Future  -     CBC Auto Differential; Future  -     Urinalysis With / Microscopic If Indicated - Urine, Clean Catch; Future    Other hyperlipidemia  Comments:  need diet/ex  Orders:  -     Comprehensive Metabolic Panel; Future  -     Lipid Panel; Future    Gastroesophageal reflux disease, esophagitis presence not specified  Comments:  stable - call if sx worsen    Obstructive sleep apnea syndrome  Comments:  ok with cpap nightly    Other orders  -     nitroglycerin (NITROSTAT) 0.4 MG SL tablet; Place 0.4 mg under the tongue As Needed for Chest Pain.

## 2018-06-01 ENCOUNTER — OFFICE VISIT (OUTPATIENT)
Dept: INTERNAL MEDICINE | Facility: CLINIC | Age: 83
End: 2018-06-01

## 2018-06-01 VITALS
DIASTOLIC BLOOD PRESSURE: 72 MMHG | OXYGEN SATURATION: 93 % | BODY MASS INDEX: 33.48 KG/M2 | HEART RATE: 70 BPM | SYSTOLIC BLOOD PRESSURE: 118 MMHG | WEIGHT: 230 LBS | TEMPERATURE: 99.5 F

## 2018-06-01 DIAGNOSIS — J02.9 SORE THROAT: Primary | ICD-10-CM

## 2018-06-01 LAB
EXPIRATION DATE: NORMAL
INTERNAL CONTROL: NORMAL
Lab: NORMAL
S PYO AG THROAT QL: NEGATIVE

## 2018-06-01 PROCEDURE — 99213 OFFICE O/P EST LOW 20 MIN: CPT | Performed by: NURSE PRACTITIONER

## 2018-06-01 PROCEDURE — 87880 STREP A ASSAY W/OPTIC: CPT | Performed by: NURSE PRACTITIONER

## 2018-06-01 RX ORDER — AMOXICILLIN 875 MG/1
875 TABLET, COATED ORAL 2 TIMES DAILY
Qty: 14 TABLET | Refills: 0 | Status: SHIPPED | OUTPATIENT
Start: 2018-06-01 | End: 2018-06-08

## 2018-06-01 RX ORDER — FEXOFENADINE HYDROCHLORIDE 60 MG/1
60 TABLET, FILM COATED ORAL DAILY
Qty: 7 TABLET | Refills: 0
Start: 2018-06-01 | End: 2019-02-04

## 2018-06-01 NOTE — PROGRESS NOTES
Subjective   Max Shah is a 84 y.o. male.     No recent travel. No known ID contact/       Sore Throat    This is a new problem. The current episode started in the past 7 days. There has been no fever. The pain is at a severity of 5/10. The pain is moderate. Pertinent negatives include no abdominal pain, congestion, coughing, diarrhea, ear discharge, ear pain, headaches, plugged ear sensation, neck pain, shortness of breath, swollen glands, trouble swallowing or vomiting. Associated symptoms comments: He denies any reflux/indigestion . He has had no exposure to strep or mono. He has tried nothing for the symptoms.        The following portions of the patient's history were reviewed and updated as appropriate: allergies, current medications, past social history and problem list.    Review of Systems   Constitutional: Negative for appetite change, chills, fatigue and fever.   HENT: Positive for sore throat. Negative for congestion, ear discharge, ear pain, facial swelling, hearing loss, postnasal drip, rhinorrhea, sinus pressure, sneezing, tinnitus and trouble swallowing.    Respiratory: Negative for cough, chest tightness, shortness of breath and wheezing.    Cardiovascular: Negative for chest pain, palpitations and leg swelling.   Gastrointestinal: Negative for abdominal pain, diarrhea, nausea and vomiting.   Musculoskeletal: Negative for neck pain and neck stiffness.   Allergic/Immunologic: Negative for environmental allergies.   Neurological: Negative for headaches.   Hematological: Negative for adenopathy.       Objective   Physical Exam   Constitutional: He appears well-developed and well-nourished. He is cooperative. He does not have a sickly appearance. He does not appear ill.   HENT:   Head: Normocephalic.   Right Ear: Tympanic membrane and external ear normal. No drainage, swelling or tenderness. No mastoid tenderness. Tympanic membrane is not injected, not scarred, not erythematous and not bulging.  Tympanic membrane mobility is normal. No middle ear effusion. Decreased hearing is noted.   Left Ear: External ear normal. No drainage, swelling or tenderness. No mastoid tenderness. Tympanic membrane is not injected, not scarred, not erythematous and not bulging. Tympanic membrane mobility is normal.  No middle ear effusion. Decreased hearing is noted.   Nose: Nose normal. No mucosal edema, rhinorrhea, sinus tenderness or nasal deformity. Right sinus exhibits no maxillary sinus tenderness and no frontal sinus tenderness. Left sinus exhibits no maxillary sinus tenderness and no frontal sinus tenderness.   Mouth/Throat: Mucous membranes are normal. Normal dentition. Posterior oropharyngeal erythema (mild ) present. No tonsillar exudate.   Bilateral hearing aids    Eyes: Conjunctivae and lids are normal. Pupils are equal, round, and reactive to light. Right eye exhibits no discharge and no exudate. Left eye exhibits no discharge and no exudate.   Neck: Trachea normal and normal range of motion. No edema present. No thyroid mass and no thyromegaly present.   Cardiovascular: Regular rhythm, normal heart sounds and normal pulses.    No murmur heard.  Pulmonary/Chest: Breath sounds normal. No respiratory distress. He has no decreased breath sounds. He has no wheezes. He has no rhonchi. He has no rales.   Lymphadenopathy:        Head (right side): No submental, no submandibular, no tonsillar, no preauricular, no posterior auricular and no occipital adenopathy present.        Head (left side): No submental, no submandibular, no tonsillar, no preauricular, no posterior auricular and no occipital adenopathy present.     He has no cervical adenopathy.   Neurological: He is alert.   Skin: Skin is warm, dry and intact. No cyanosis. Nails show no clubbing.       Assessment/Plan   Max was seen today for sore throat.    Diagnoses and all orders for this visit:    Sore throat  Comments:  may use cepacol lozenges   Orders:  -      POCT rapid strep A  -     fexofenadine (ALLEGRA) 60 MG tablet; Take 1 tablet by mouth Daily.  -     amoxicillin (AMOXIL) 875 MG tablet; Take 1 tablet by mouth 2 (Two) Times a Day for 7 days.    Rapid strep negative. I suspect symptoms are viral/allergic in nature. He will start with antihistamine and if no relief start antibiotic.

## 2018-06-01 NOTE — PATIENT INSTRUCTIONS
Pharyngitis  Pharyngitis is redness, pain, and swelling (inflammation) of your pharynx.  What are the causes?  Pharyngitis is usually caused by infection. Most of the time, these infections are from viruses (viral) and are part of a cold. However, sometimes pharyngitis is caused by bacteria (bacterial). Pharyngitis can also be caused by allergies. Viral pharyngitis may be spread from person to person by coughing, sneezing, and personal items or utensils (cups, forks, spoons, toothbrushes). Bacterial pharyngitis may be spread from person to person by more intimate contact, such as kissing.  What are the signs or symptoms?  Symptoms of pharyngitis include:  · Sore throat.  · Tiredness (fatigue).  · Low-grade fever.  · Headache.  · Joint pain and muscle aches.  · Skin rashes.  · Swollen lymph nodes.  · Plaque-like film on throat or tonsils (often seen with bacterial pharyngitis).  How is this diagnosed?  Your health care provider will ask you questions about your illness and your symptoms. Your medical history, along with a physical exam, is often all that is needed to diagnose pharyngitis. Sometimes, a rapid strep test is done. Other lab tests may also be done, depending on the suspected cause.  How is this treated?  Viral pharyngitis will usually get better in 3-4 days without the use of medicine. Bacterial pharyngitis is treated with medicines that kill germs (antibiotics).  Follow these instructions at home:  · Drink enough water and fluids to keep your urine clear or pale yellow.  · Only take over-the-counter or prescription medicines as directed by your health care provider:  ¨ If you are prescribed antibiotics, make sure you finish them even if you start to feel better.  ¨ Do not take aspirin.  · Get lots of rest.  · Gargle with 8 oz of salt water (½ tsp of salt per 1 qt of water) as often as every 1-2 hours to soothe your throat.  · Throat lozenges (if you are not at risk for choking) or sprays may be used to  soothe your throat.  Contact a health care provider if:  · You have large, tender lumps in your neck.  · You have a rash.  · You cough up green, yellow-brown, or bloody spit.  Get help right away if:  · Your neck becomes stiff.  · You drool or are unable to swallow liquids.  · You vomit or are unable to keep medicines or liquids down.  · You have severe pain that does not go away with the use of recommended medicines.  · You have trouble breathing (not caused by a stuffy nose).  This information is not intended to replace advice given to you by your health care provider. Make sure you discuss any questions you have with your health care provider.  Document Released: 12/18/2006 Document Revised: 05/25/2017 Document Reviewed: 08/25/2014  [employer] Interactive Patient Education © 2017 [employer]

## 2018-08-03 ENCOUNTER — OFFICE VISIT (OUTPATIENT)
Dept: INTERNAL MEDICINE | Facility: CLINIC | Age: 83
End: 2018-08-03

## 2018-08-03 VITALS
BODY MASS INDEX: 32.96 KG/M2 | SYSTOLIC BLOOD PRESSURE: 142 MMHG | DIASTOLIC BLOOD PRESSURE: 74 MMHG | HEIGHT: 70 IN | WEIGHT: 230.2 LBS

## 2018-08-03 DIAGNOSIS — R53.82 CHRONIC FATIGUE: Primary | ICD-10-CM

## 2018-08-03 DIAGNOSIS — G47.33 OBSTRUCTIVE SLEEP APNEA SYNDROME: ICD-10-CM

## 2018-08-03 DIAGNOSIS — E78.49 OTHER HYPERLIPIDEMIA: ICD-10-CM

## 2018-08-03 DIAGNOSIS — R26.89 BALANCE DISORDER: ICD-10-CM

## 2018-08-03 DIAGNOSIS — I10 ESSENTIAL HYPERTENSION: ICD-10-CM

## 2018-08-03 PROBLEM — G47.30 SLEEP APNEA: Status: ACTIVE | Noted: 2018-02-01

## 2018-08-03 LAB
25(OH)D3 SERPL-MCNC: 36.8 NG/ML (ref 30–100)
ALBUMIN SERPL-MCNC: 4.7 G/DL (ref 3.5–5.2)
ALBUMIN/GLOB SERPL: 1.7 G/DL
ALP SERPL-CCNC: 81 U/L (ref 39–117)
ALT SERPL-CCNC: 14 U/L (ref 1–41)
AST SERPL-CCNC: 13 U/L (ref 1–40)
BASOPHILS # BLD AUTO: 0.01 10*3/MM3 (ref 0–0.2)
BASOPHILS NFR BLD AUTO: 0.1 % (ref 0–2)
BILIRUB SERPL-MCNC: 1.1 MG/DL (ref 0.1–1.2)
BILIRUB UR QL STRIP: NEGATIVE
BUN SERPL-MCNC: 20 MG/DL (ref 8–23)
BUN/CREAT SERPL: 20.2 (ref 7–25)
CALCIUM SERPL-MCNC: 10.3 MG/DL (ref 8.6–10.5)
CHLORIDE SERPL-SCNC: 100 MMOL/L (ref 98–107)
CHOLEST SERPL-MCNC: 129 MG/DL (ref 0–200)
CLARITY UR: CLEAR
CO2 SERPL-SCNC: 27.3 MMOL/L (ref 22–29)
COLOR UR: YELLOW
CREAT SERPL-MCNC: 0.99 MG/DL (ref 0.76–1.27)
DEPRECATED RDW RBC AUTO: 43.4 FL (ref 37–54)
EOSINOPHIL # BLD AUTO: 0.36 10*3/MM3 (ref 0–0.7)
EOSINOPHIL NFR BLD AUTO: 4.9 % (ref 0–5)
ERYTHROCYTE [DISTWIDTH] IN BLOOD BY AUTOMATED COUNT: 13 % (ref 11.5–15)
GLOBULIN SER CALC-MCNC: 2.7 GM/DL
GLUCOSE SERPL-MCNC: 97 MG/DL (ref 65–99)
GLUCOSE UR STRIP-MCNC: NEGATIVE MG/DL
HCT VFR BLD AUTO: 46.6 % (ref 40.1–51)
HDLC SERPL-MCNC: 54 MG/DL (ref 40–60)
HGB BLD-MCNC: 15.5 G/DL (ref 13.7–17.5)
HGB UR QL STRIP.AUTO: NEGATIVE
KETONES UR QL STRIP: NEGATIVE
LDLC SERPL CALC-MCNC: 61 MG/DL (ref 0–100)
LEUKOCYTE ESTERASE UR QL STRIP.AUTO: NEGATIVE
LYMPHOCYTES # BLD AUTO: 1.56 10*3/MM3 (ref 0.8–7)
LYMPHOCYTES NFR BLD AUTO: 21.3 % (ref 10–60)
MCH RBC QN AUTO: 30.6 PG (ref 26–34)
MCHC RBC AUTO-ENTMCNC: 33.3 G/DL (ref 31–37)
MCV RBC AUTO: 92.1 FL (ref 80–100)
MONOCYTES # BLD AUTO: 0.57 10*3/MM3 (ref 0–1)
MONOCYTES NFR BLD AUTO: 7.8 % (ref 0–13)
NEUTROPHILS # BLD AUTO: 4.84 10*3/MM3 (ref 1–11)
NEUTROPHILS NFR BLD AUTO: 65.9 % (ref 30–85)
NITRITE UR QL STRIP: NEGATIVE
PH UR STRIP.AUTO: 6.5 [PH] (ref 5–8)
PLATELET # BLD AUTO: 185 10*3/MM3 (ref 150–450)
PMV BLD AUTO: 10.2 FL (ref 6–12)
POTASSIUM SERPL-SCNC: 4.3 MMOL/L (ref 3.5–5.2)
PROT SERPL-MCNC: 7.4 G/DL (ref 6–8.5)
PROT UR QL STRIP: NEGATIVE
RBC # BLD AUTO: 5.06 10*6/MM3 (ref 4.63–6.08)
SODIUM SERPL-SCNC: 140 MMOL/L (ref 136–145)
SP GR UR STRIP: 1.02 (ref 1–1.03)
TRIGL SERPL-MCNC: 68 MG/DL (ref 0–150)
TSH SERPL-ACNC: 2.74 MIU/ML (ref 0.27–4.2)
UROBILINOGEN UR QL STRIP: NORMAL
VLDLC SERPL-MCNC: 13.6 MG/DL (ref 5–40)
WBC NRBC COR # BLD: 7.34 10*3/MM3 (ref 5–10)

## 2018-08-03 PROCEDURE — 81003 URINALYSIS AUTO W/O SCOPE: CPT | Performed by: INTERNAL MEDICINE

## 2018-08-03 PROCEDURE — 85025 COMPLETE CBC W/AUTO DIFF WBC: CPT | Performed by: INTERNAL MEDICINE

## 2018-08-03 PROCEDURE — 99214 OFFICE O/P EST MOD 30 MIN: CPT | Performed by: INTERNAL MEDICINE

## 2018-10-29 RX ORDER — LOSARTAN POTASSIUM 100 MG/1
TABLET ORAL
Qty: 90 TABLET | Refills: 3 | Status: SHIPPED | OUTPATIENT
Start: 2018-10-29 | End: 2019-10-16 | Stop reason: SDUPTHER

## 2018-11-05 ENCOUNTER — OFFICE VISIT (OUTPATIENT)
Dept: SLEEP MEDICINE | Facility: HOSPITAL | Age: 83
End: 2018-11-05
Attending: INTERNAL MEDICINE

## 2018-11-05 VITALS
DIASTOLIC BLOOD PRESSURE: 75 MMHG | HEIGHT: 71 IN | HEART RATE: 84 BPM | OXYGEN SATURATION: 93 % | WEIGHT: 232 LBS | BODY MASS INDEX: 32.48 KG/M2 | SYSTOLIC BLOOD PRESSURE: 133 MMHG

## 2018-11-05 DIAGNOSIS — G47.33 OSA ON CPAP: Primary | ICD-10-CM

## 2018-11-05 DIAGNOSIS — Z99.89 OSA ON CPAP: Primary | ICD-10-CM

## 2018-11-05 PROCEDURE — G0463 HOSPITAL OUTPT CLINIC VISIT: HCPCS

## 2018-11-05 NOTE — PROGRESS NOTES
"[facility] PULMONARY CARE         Dr Markel Barraza  [unfilled]  Patient Care Team:  Brianne Solomon MD as PCP - General (Internal Medicine)  Brianne Solomon MD as PCP - Claims Attributed  Lulú Bettencourt MD as Consulting Physician (Cardiology)  Bear Boyd MD as Consulting Physician (Otolaryngology)  Skip Malik MD as Consulting Physician (Gastroenterology)  Clarke Bettencourt MD as Consulting Physician (Dermatology)  Audrey Elena MD as Consulting Physician (Vascular Surgery)    Chief Complaint: Initial home sleep study showed ordered obstructive sleep apnea syndrome apnea index 26 events per hour.      Interval History: Patient here and will visit for compliance download.  Currently on CPAP 10 cm.  Complaints today 100% with average daily use of 10 hours 26 minutes.  Feels benefit from CPAP.  Bedtime 10 PM awake time 9 AM.  No tobacco abuse now: Abuse in abuse.  Medication list reviewed.    REVIEW OF SYSTEMS:   CARDIOVASCULAR: No chest pain, chest pressure or chest discomfort. No palpitations or edema.   RESPIRATORY: No shortness of breath, cough or sputum.   GASTROINTESTINAL: No anorexia, nausea, vomiting or diarrhea. No abdominal pain or blood.   HEMATOLOGIC: No bleeding or bruising.   [facility]    Ventilator/Non-Invasive Ventilation Settings     None            Vital Signs  Heart Rate:  [84] 84  BP: (133)/(75) 133/75  [unfilled]  Flowsheet Rows      First Filed Value   Admission Height  180.3 cm (71\") Documented at 11/05/2018 1100   Admission Weight  105 kg (232 lb) Documented at 11/05/2018 1100          Physical Exam:   General Appearance:    Alert, cooperative, in no acute distress  ENT Mallampati between 3 and 4    Lungs:     Clear to auscultation,respirations regular, even and                  unlabored    Heart:    Regular rhythm and normal rate, normal S1 and S2, no            murmur, no gallop, no rub, no click   Chest Wall:    No abnormalities observed   Abdomen: "     Normal bowel sounds, no masses, no organomegaly, soft        non-tender, non-distended, no guarding, no rebound                tenderness   Extremities:   Moves all extremities well, no edema, no cyanosis, no             redness     Results Review:                                          I reviewed the patient's new clinical results.  I personally viewed and interpreted the patient's CXR        Medication Review:         No current facility-administered medications for this visit.     ASSESSMENT:   Obstructive sleep apnea syndrome comorbidities of hypertension    PLAN:  Reviewed download with the patient.  Patient having adequate benefit from CPAP.  Excellent compliance AHI and leak  Continue CPAP 10 cm  No issues per patient  Sleep hygiene measures  Follow-up in 1 year    Markel Barraza MD  11/05/18  11:28 AM

## 2018-12-05 RX ORDER — SIMVASTATIN 20 MG
TABLET ORAL
Qty: 90 TABLET | Refills: 3 | Status: SHIPPED | OUTPATIENT
Start: 2018-12-05 | End: 2019-11-26 | Stop reason: SDUPTHER

## 2019-02-04 ENCOUNTER — OFFICE VISIT (OUTPATIENT)
Dept: INTERNAL MEDICINE | Facility: CLINIC | Age: 84
End: 2019-02-04

## 2019-02-04 VITALS
TEMPERATURE: 96.9 F | SYSTOLIC BLOOD PRESSURE: 144 MMHG | DIASTOLIC BLOOD PRESSURE: 80 MMHG | BODY MASS INDEX: 32.53 KG/M2 | WEIGHT: 227.2 LBS | OXYGEN SATURATION: 96 % | HEIGHT: 70 IN | RESPIRATION RATE: 16 BRPM | HEART RATE: 73 BPM

## 2019-02-04 DIAGNOSIS — Z00.00 MEDICARE ANNUAL WELLNESS VISIT, SUBSEQUENT: ICD-10-CM

## 2019-02-04 DIAGNOSIS — E78.49 OTHER HYPERLIPIDEMIA: ICD-10-CM

## 2019-02-04 DIAGNOSIS — R53.82 CHRONIC FATIGUE: ICD-10-CM

## 2019-02-04 DIAGNOSIS — G47.33 OBSTRUCTIVE SLEEP APNEA: ICD-10-CM

## 2019-02-04 DIAGNOSIS — I25.10 CORONARY ARTERY DISEASE INVOLVING NATIVE CORONARY ARTERY OF NATIVE HEART WITHOUT ANGINA PECTORIS: ICD-10-CM

## 2019-02-04 DIAGNOSIS — I10 ESSENTIAL HYPERTENSION: Primary | ICD-10-CM

## 2019-02-04 LAB
ALBUMIN SERPL-MCNC: 4.3 G/DL (ref 3.5–5.2)
ALBUMIN/GLOB SERPL: 1.5 G/DL
ALP SERPL-CCNC: 70 U/L (ref 39–117)
ALT SERPL W P-5'-P-CCNC: 14 U/L (ref 1–41)
ANION GAP SERPL CALCULATED.3IONS-SCNC: 10.2 MMOL/L
AST SERPL-CCNC: 17 U/L (ref 1–40)
BILIRUB SERPL-MCNC: 1.2 MG/DL (ref 0.1–1.2)
BUN BLD-MCNC: 18 MG/DL (ref 8–23)
BUN/CREAT SERPL: 21.7 (ref 7–25)
CALCIUM SPEC-SCNC: 9.7 MG/DL (ref 8.6–10.5)
CHLORIDE SERPL-SCNC: 101 MMOL/L (ref 98–107)
CHOLEST SERPL-MCNC: 129 MG/DL (ref 0–200)
CO2 SERPL-SCNC: 26.8 MMOL/L (ref 22–29)
CREAT BLD-MCNC: 0.83 MG/DL (ref 0.76–1.27)
GFR SERPL CREATININE-BSD FRML MDRD: 88 ML/MIN/1.73
GLOBULIN UR ELPH-MCNC: 2.8 GM/DL
GLUCOSE BLD-MCNC: 101 MG/DL (ref 65–99)
HDLC SERPL-MCNC: 47 MG/DL (ref 40–60)
LDLC SERPL CALC-MCNC: 69 MG/DL (ref 0–100)
LDLC/HDLC SERPL: 1.46 {RATIO}
POTASSIUM BLD-SCNC: 4.5 MMOL/L (ref 3.5–5.2)
PROT SERPL-MCNC: 7.1 G/DL (ref 6–8.5)
SODIUM BLD-SCNC: 138 MMOL/L (ref 136–145)
TRIGL SERPL-MCNC: 66 MG/DL (ref 0–150)
VLDLC SERPL-MCNC: 13.2 MG/DL (ref 5–40)

## 2019-02-04 PROCEDURE — 80061 LIPID PANEL: CPT | Performed by: INTERNAL MEDICINE

## 2019-02-04 PROCEDURE — 80053 COMPREHEN METABOLIC PANEL: CPT | Performed by: INTERNAL MEDICINE

## 2019-02-04 PROCEDURE — 36415 COLL VENOUS BLD VENIPUNCTURE: CPT | Performed by: INTERNAL MEDICINE

## 2019-02-04 PROCEDURE — G0439 PPPS, SUBSEQ VISIT: HCPCS | Performed by: INTERNAL MEDICINE

## 2019-02-04 PROCEDURE — 99214 OFFICE O/P EST MOD 30 MIN: CPT | Performed by: INTERNAL MEDICINE

## 2019-02-04 RX ORDER — NITROGLYCERIN 0.4 MG/1
TABLET SUBLINGUAL
Refills: 3 | COMMUNITY
Start: 2019-01-07

## 2019-02-04 NOTE — PROGRESS NOTES
"Subjective   Max Shah is a 84 y.o. male here for   Chief Complaint   Patient presents with   • Medicare Wellness-subsequent   • Hyperlipidemia   • Hypertension   .    Vitals:    02/04/19 0901 02/04/19 0935   BP: 136/74 144/80   BP Location: Left arm    Patient Position: Sitting    Cuff Size: Adult    Pulse: 73    Resp: 16    Temp: 96.9 °F (36.1 °C)    TempSrc: Temporal    SpO2: 96%    Weight: 103 kg (227 lb 3.2 oz)    Height: 176.5 cm (69.5\")        Body mass index is 33.07 kg/m².    Hyperlipidemia   This is a chronic problem. The current episode started more than 1 year ago. The problem is controlled. Recent lipid tests were reviewed and are normal. He has no history of diabetes or obesity. Pertinent negatives include no chest pain or shortness of breath.   Hypertension   This is a chronic problem. The current episode started more than 1 year ago. The problem is unchanged. The problem is controlled. Pertinent negatives include no chest pain, palpitations or shortness of breath.        The following portions of the patient's history were reviewed and updated as appropriate: allergies, current medications, past social history and problem list.    Review of Systems   Constitutional: Positive for fatigue (better with CPAP). Negative for chills and fever.   Respiratory: Negative for cough, shortness of breath and wheezing.    Cardiovascular: Negative for chest pain, palpitations and leg swelling.   Psychiatric/Behavioral: Negative for dysphoric mood and sleep disturbance. The patient is not nervous/anxious.        Objective   Physical Exam   Constitutional: He appears well-developed and well-nourished. No distress.   Cardiovascular: Normal rate, regular rhythm and normal heart sounds.   Pulmonary/Chest: No respiratory distress. He has no wheezes. He has no rales. He exhibits no tenderness.   Musculoskeletal: He exhibits no edema.   Psychiatric: He has a normal mood and affect. His behavior is normal.   Nursing " note and vitals reviewed.      Assessment/Plan   Diagnoses and all orders for this visit:    Essential hypertension  Comments:  controlled - call if bp over 140/90  Orders:  -     Comprehensive Metabolic Panel; Future  -     Lipid Panel; Future    Other hyperlipidemia  Comments:  need diet/ex  Orders:  -     Comprehensive Metabolic Panel; Future  -     Lipid Panel; Future    Coronary artery disease involving native coronary artery of native heart without angina pectoris  Comments:  f/u with cardiology yearly    Obstructive sleep apnea  Comments:  continue CPAP nightly    Chronic fatigue  Comments:  better if worse    Medicare annual wellness visit, subsequent    Other orders  -     nitroglycerin (NITROSTAT) 0.4 MG SL tablet; PLACE 1 TABLET UNDER THE TONGUE EVERY 5 (FIVE) MINUTES AS NEEDED FOR CHEST PAIN.     Need Tdap, hep A & shingrix at pharmacy.     Wellness today.   Need daily strengthening & balance exercises (shown today).   Need screening for AAA & carotid disease.  Information given today.

## 2019-02-04 NOTE — PROGRESS NOTES
QUICK REFERENCE INFORMATION:  The ABCs of the Annual Wellness Visit    Subsequent Medicare Wellness Visit    HEALTH RISK ASSESSMENT    1934    Recent Hospitalizations:  No hospitalization(s) within the last year..        Current Medical Providers:  Patient Care Team:  Brianne Solomon MD as PCP - General (Internal Medicine)  Brianne Solomon MD as PCP - Claims Attributed  Lulú Bettencourt MD as Consulting Physician (Cardiology)  Bear Boyd MD as Consulting Physician (Otolaryngology)  Skip Malik MD as Consulting Physician (Gastroenterology)  Clarke Bettencourt MD as Consulting Physician (Dermatology)  Audrey Elena MD as Consulting Physician (Vascular Surgery)        Smoking Status:  Social History     Tobacco Use   Smoking Status Never Smoker   Smokeless Tobacco Never Used       Alcohol Consumption:  Social History     Substance and Sexual Activity   Alcohol Use Yes    Comment: moderate       Depression Screen:   PHQ-2/PHQ-9 Depression Screening 8/3/2018   Little interest or pleasure in doing things 0   Feeling down, depressed, or hopeless 0   Trouble falling or staying asleep, or sleeping too much -   Feeling tired or having little energy -   Poor appetite or overeating -   Feeling bad about yourself - or that you are a failure or have let yourself or your family down -   Trouble concentrating on things, such as reading the newspaper or watching television -   Moving or speaking so slowly that other people could have noticed. Or the opposite - being so fidgety or restless that you have been moving around a lot more than usual -   Thoughts that you would be better off dead, or of hurting yourself in some way -   Total Score 0       Health Habits and Functional and Cognitive Screening:  Functional & Cognitive Status 8/1/2017   Do you have difficulty preparing food and eating? No   Do you have difficulty bathing yourself, getting dressed or grooming yourself? No   Do you have  difficulty using the toilet? No   Do you have difficulty moving around from place to place? No   In the past year have you fallen or experienced a near fall? No   Do you need help using the phone?  No   Are you deaf or do you have serious difficulty hearing?  Yes   Do you need help with transportation? No   Do you need help shopping? No   Do you need help preparing meals?  No   Do you need help with housework?  No   Do you need help with laundry? No   Do you need help taking your medications? No   Do you need help managing money? No   Do you have difficulty concentrating, remembering or making decisions? No           Does the patient have evidence of cognitive impairment? No    Aspirin use counseling: Taking ASA appropriately as indicated      Recent Lab Results:  CMP:  Lab Results   Component Value Date    GLU 97 08/03/2018    BUN 20 08/03/2018    CREATININE 0.99 08/03/2018    EGFRIFNONA 72 08/03/2018    EGFRIFAFRI 87 08/03/2018    BCR 20.2 08/03/2018     08/03/2018    K 4.3 08/03/2018    CO2 27.3 08/03/2018    CALCIUM 10.3 08/03/2018    PROTENTOTREF 7.4 08/03/2018    ALBUMIN 4.70 08/03/2018    LABGLOBREF 2.7 08/03/2018    LABIL2 1.7 08/03/2018    BILITOT 1.1 08/03/2018    ALKPHOS 81 08/03/2018    AST 13 08/03/2018    ALT 14 08/03/2018     Lipid Panel:  Lab Results   Component Value Date    CHOL 129 02/01/2018    TRIG 68 08/03/2018    HDL 54 08/03/2018    VLDL 13.6 08/03/2018    LDLHDL 1.24 02/01/2018     HbA1c:       Visual Acuity:  No exam data present    Age-appropriate Screening Schedule:  Refer to the list below for future screening recommendations based on patient's age, sex and/or medical conditions. Orders for these recommended tests are listed in the plan section. The patient has been provided with a written plan.    Health Maintenance   Topic Date Due   • TDAP/TD VACCINES (1 - Tdap) 03/01/1953   • ZOSTER VACCINE (2 of 2) 01/07/2013   • INFLUENZA VACCINE  08/01/2018   • LIPID PANEL  08/03/2019   •  "PNEUMOCOCCAL VACCINES (65+ LOW/MEDIUM RISK)  Completed        Subjective   History of Present Illness    Max Shah is a 84 y.o. male who presents for an Subsequent Wellness Visit.    The following portions of the patient's history were reviewed and updated as appropriate: allergies, current medications, past family history, past medical history, past social history, past surgical history and problem list.    Outpatient Medications Prior to Visit   Medication Sig Dispense Refill   • aspirin 81 MG EC tablet Take 81 mg by mouth daily.     • diltiazem (TIAZAC) 120 MG 24 hr capsule Take 120 mg by mouth.     • fexofenadine (ALLEGRA) 60 MG tablet Take 1 tablet by mouth Daily. 7 tablet 0   • losartan (COZAAR) 100 MG tablet TAKE 1 TABLET EVERY DAY 90 tablet 3   • Multiple Vitamin (MULTI-VITAMIN DAILY PO) Take 1 tablet by mouth Daily.     • nitroglycerin (NITROSTAT) 0.4 MG SL tablet PLACE 1 TABLET UNDER THE TONGUE EVERY 5 (FIVE) MINUTES AS NEEDED FOR CHEST PAIN.  3   • simvastatin (ZOCOR) 20 MG tablet TAKE 1 TABLET BY MOUTH EVERY DAY 90 tablet 3     No facility-administered medications prior to visit.        Patient Active Problem List   Diagnosis   • Hypertension   • CAD (coronary artery disease)   • GERD (gastroesophageal reflux disease)   • Fatigue   • Hand pain, right   • Stenosis of carotid artery   • Diastolic dysfunction   • Hyperlipidemia   • Pulmonary hypertension (CMS/[facility])   • Obstructive sleep apnea       Advance Care Planning:  has NO advance directive - not interested in additional information    Identification of Risk Factors:  Risk factors include: weight , unhealthy diet, cardiovascular risk, inactivity and increased fall risk.    Review of Systems    Compared to one year ago, the patient feels his physical health is the same.  Compared to one year ago, the patient feels his mental health is the same.    Objective     Physical Exam    Vitals:    02/04/19 0901   Height: 176.5 cm (69.5\")       Patient's " Body mass index is 33.77 kg/m². BMI is above normal parameters. Recommendations include: exercise counseling, no follow-up required and nutrition counseling.      Assessment/Plan   Patient Self-Management and Personalized Health Advice  The patient has been provided with information about: diet, exercise, weight management, prevention of cardiac or vascular disease, the relationship between weight and GERD, fall prevention and designing advance directives and preventive services including:   · Advance directive, Counseling for cardiovascular disease risk reduction, Diabetes screening, see lab orders, Exercise counseling provided, Fall Risk assessment done, Fall Risk plan of care done, Nutrition counseling provided.    Visit Diagnoses:  No diagnosis found.    No orders of the defined types were placed in this encounter.      Outpatient Encounter Medications as of 2/4/2019   Medication Sig Dispense Refill   • aspirin 81 MG EC tablet Take 81 mg by mouth daily.     • diltiazem (TIAZAC) 120 MG 24 hr capsule Take 120 mg by mouth.     • fexofenadine (ALLEGRA) 60 MG tablet Take 1 tablet by mouth Daily. 7 tablet 0   • losartan (COZAAR) 100 MG tablet TAKE 1 TABLET EVERY DAY 90 tablet 3   • Multiple Vitamin (MULTI-VITAMIN DAILY PO) Take 1 tablet by mouth Daily.     • nitroglycerin (NITROSTAT) 0.4 MG SL tablet PLACE 1 TABLET UNDER THE TONGUE EVERY 5 (FIVE) MINUTES AS NEEDED FOR CHEST PAIN.  3   • simvastatin (ZOCOR) 20 MG tablet TAKE 1 TABLET BY MOUTH EVERY DAY 90 tablet 3     No facility-administered encounter medications on file as of 2/4/2019.        Reviewed use of high risk medication in the elderly: not applicable  Reviewed for potential of harmful drug interactions in the elderly: not applicable    Follow Up:  No Follow-up on file.     An After Visit Summary and PPPS with all of these plans were given to the patient.

## 2019-02-04 NOTE — PATIENT INSTRUCTIONS
Medicare Wellness  Personal Prevention Plan of Service     Date of Office Visit:  2019  Encounter Provider:  Brianne Solomon MD  Place of Service:  [facility] INTERNAL MEDICINE  Patient Name: Max Shah  :  1934    As part of the Medicare Wellness portion of your visit today, we are providing you with this personalized preventive plan of services (PPPS). This plan is based upon recommendations of the [place] Preventive Services Task Force (USPSTF) and the Advisory Committee on Immunization Practices (ACIP).    This lists the preventive care services that should be considered, and provides dates of when you are due. Items listed as completed are up-to-date and do not require any further intervention.    Health Maintenance   Topic Date Due   • TDAP/TD VACCINES (1 - Tdap) 1953   • ZOSTER VACCINE (2 of 2) 2013   • MEDICARE ANNUAL WELLNESS  2018   • LIPID PANEL  2019   • INFLUENZA VACCINE  Completed   • PNEUMOCOCCAL VACCINES (65+ LOW/MEDIUM RISK)  Completed       No orders of the defined types were placed in this encounter.      No Follow-up on file.

## 2019-05-15 ENCOUNTER — TELEPHONE (OUTPATIENT)
Dept: INTERNAL MEDICINE | Facility: CLINIC | Age: 84
End: 2019-05-15

## 2019-05-15 NOTE — TELEPHONE ENCOUNTER
----- Message from Margarette Chowdary sent at 5/15/2019  2:21 PM EDT -----  Contact: Pt   Pt is calling to get another referral for PT due to Balance and vertigo.  [facility] - [phone]    Pt# [phone]

## 2019-05-16 DIAGNOSIS — R26.89 BALANCE DISORDER: Primary | ICD-10-CM

## 2019-08-12 ENCOUNTER — OFFICE VISIT (OUTPATIENT)
Dept: INTERNAL MEDICINE | Facility: CLINIC | Age: 84
End: 2019-08-12

## 2019-08-12 VITALS
DIASTOLIC BLOOD PRESSURE: 82 MMHG | HEIGHT: 70 IN | WEIGHT: 225.4 LBS | SYSTOLIC BLOOD PRESSURE: 144 MMHG | BODY MASS INDEX: 32.27 KG/M2

## 2019-08-12 DIAGNOSIS — E78.49 OTHER HYPERLIPIDEMIA: ICD-10-CM

## 2019-08-12 DIAGNOSIS — I10 ESSENTIAL HYPERTENSION: Primary | ICD-10-CM

## 2019-08-12 DIAGNOSIS — R53.82 CHRONIC FATIGUE: ICD-10-CM

## 2019-08-12 DIAGNOSIS — R42 LIGHTHEADEDNESS: ICD-10-CM

## 2019-08-12 DIAGNOSIS — R20.2 PARESTHESIA OF BOTH FEET: ICD-10-CM

## 2019-08-12 DIAGNOSIS — R73.09 ELEVATED GLUCOSE: ICD-10-CM

## 2019-08-12 DIAGNOSIS — I65.21 STENOSIS OF RIGHT CAROTID ARTERY: ICD-10-CM

## 2019-08-12 LAB
ALBUMIN SERPL-MCNC: 4.6 G/DL (ref 3.5–5.2)
ALBUMIN/GLOB SERPL: 1.5 G/DL
ALP SERPL-CCNC: 78 U/L (ref 39–117)
ALT SERPL W P-5'-P-CCNC: 10 U/L (ref 1–41)
ANION GAP SERPL CALCULATED.3IONS-SCNC: 13.9 MMOL/L (ref 5–15)
AST SERPL-CCNC: 14 U/L (ref 1–40)
BILIRUB SERPL-MCNC: 1.2 MG/DL (ref 0.2–1.2)
BUN BLD-MCNC: 20 MG/DL (ref 8–23)
BUN/CREAT SERPL: 20.8 (ref 7–25)
CALCIUM SPEC-SCNC: 10 MG/DL (ref 8.6–10.5)
CHLORIDE SERPL-SCNC: 98 MMOL/L (ref 98–107)
CHOLEST SERPL-MCNC: 135 MG/DL (ref 0–200)
CO2 SERPL-SCNC: 25.1 MMOL/L (ref 22–29)
CREAT BLD-MCNC: 0.96 MG/DL (ref 0.76–1.27)
FOLATE SERPL-MCNC: >20 NG/ML (ref 4.78–24.2)
GFR SERPL CREATININE-BSD FRML MDRD: 74 ML/MIN/1.73
GLOBULIN UR ELPH-MCNC: 3 GM/DL
GLUCOSE BLD-MCNC: 96 MG/DL (ref 65–99)
HDLC SERPL-MCNC: 52 MG/DL (ref 40–60)
LDLC SERPL CALC-MCNC: 69 MG/DL (ref 0–100)
LDLC/HDLC SERPL: 1.33 {RATIO}
POTASSIUM BLD-SCNC: 3.9 MMOL/L (ref 3.5–5.2)
PROT SERPL-MCNC: 7.6 G/DL (ref 6–8.5)
SODIUM BLD-SCNC: 137 MMOL/L (ref 136–145)
TRIGL SERPL-MCNC: 70 MG/DL (ref 0–150)
TSH SERPL-ACNC: 2.01 MIU/ML (ref 0.27–4.2)
VIT B12 SERPL-MCNC: 568 PG/ML (ref 211–946)
VLDLC SERPL-MCNC: 14 MG/DL (ref 5–40)

## 2019-08-12 PROCEDURE — 99214 OFFICE O/P EST MOD 30 MIN: CPT | Performed by: INTERNAL MEDICINE

## 2019-08-12 PROCEDURE — 80053 COMPREHEN METABOLIC PANEL: CPT | Performed by: INTERNAL MEDICINE

## 2019-08-12 PROCEDURE — 80061 LIPID PANEL: CPT | Performed by: INTERNAL MEDICINE

## 2019-08-12 NOTE — PROGRESS NOTES
"Subjective   Max Shah is a 85 y.o. male here for   Chief Complaint   Patient presents with   • Hyperlipidemia     6 month follow-up   • Hypertension   • Dizziness     started this morning   .    Vitals:    08/12/19 1308   BP: 144/82   BP Location: Left arm   Patient Position: Sitting   Cuff Size: Adult   Weight: 102 kg (225 lb 6.4 oz)   Height: 176.5 cm (69.5\")       Body mass index is 32.81 kg/m².    Hyperlipidemia   This is a chronic problem. The current episode started more than 1 year ago. The problem is controlled. Recent lipid tests were reviewed and are normal. He has no history of diabetes. Pertinent negatives include no chest pain or shortness of breath.   Hypertension   This is a chronic problem. The current episode started more than 1 year ago. The problem is unchanged. The problem is controlled. Pertinent negatives include no chest pain, palpitations or shortness of breath.   Dizziness   This is a new problem. The current episode started today. The problem occurs constantly. The problem has been unchanged. Associated symptoms include fatigue and numbness (ankles & feet - for over 6 mos). Pertinent negatives include no chest pain, chills, congestion, coughing, fever or sore throat.        The following portions of the patient's history were reviewed and updated as appropriate: allergies, current medications, past social history and problem list.    Review of Systems   Constitutional: Positive for fatigue. Negative for chills and fever.   HENT: Negative for congestion, ear pain, postnasal drip, sinus pain, sore throat, tinnitus and trouble swallowing.    Respiratory: Negative for cough, shortness of breath and wheezing.    Cardiovascular: Negative for chest pain, palpitations and leg swelling.   Neurological: Positive for dizziness, light-headedness and numbness (ankles & feet - for over 6 mos).   Psychiatric/Behavioral: Negative for dysphoric mood and sleep disturbance. The patient is not " nervous/anxious.        Objective   Physical Exam   Constitutional: He appears well-developed and well-nourished. No distress.   Cardiovascular: Normal rate, regular rhythm and normal heart sounds.   Pulmonary/Chest: No respiratory distress. He has no wheezes. He has no rales. He exhibits no tenderness.   Musculoskeletal: He exhibits no edema.   Psychiatric: He has a normal mood and affect. His behavior is normal.   Nursing note and vitals reviewed.    Gait normal, tandem gait normal.    Assessment/Plan   Diagnoses and all orders for this visit:    Essential hypertension  Comments:  controlled - call if bp over 140/90  Orders:  -     Comprehensive Metabolic Panel; Future  -     Lipid Panel; Future    Other hyperlipidemia  Comments:  continue diet/ex  Orders:  -     Comprehensive Metabolic Panel; Future  -     Lipid Panel; Future    Chronic fatigue  Comments:  mild -better with cpap    Lightheadedness  Comments:  today only - need to see vascular surgeons this wk - will get ct or mri of head if sx persist (he declines imaging today)    Stenosis of right carotid artery  Comments:  keep appt with vascular surgeon next month  Orders:  -     Ambulatory Referral to Vascular Surgery    Paresthesia of both feet  Comments:  decreased sensation of both feet/ankles - need to see vasc surgeon & be tested for diabetes  Orders:  -     Vitamin B12 & Folate; Future  -     TSH Rfx On Abnormal To Free T4; Future  -     Ambulatory Referral to Vascular Surgery    Elevated glucose  Comments:  need low sugar diet  Orders:  -     Hemoglobin A1c; Future

## 2019-08-13 LAB — HBA1C MFR BLD: 5.8 % (ref 4.8–5.6)

## 2019-10-16 RX ORDER — LOSARTAN POTASSIUM 100 MG/1
TABLET ORAL
Qty: 90 TABLET | Refills: 3 | Status: SHIPPED | OUTPATIENT
Start: 2019-10-16 | End: 2020-11-30

## 2019-10-21 ENCOUNTER — APPOINTMENT (OUTPATIENT)
Dept: SLEEP MEDICINE | Facility: HOSPITAL | Age: 84
End: 2019-10-21
Attending: INTERNAL MEDICINE

## 2019-11-26 RX ORDER — SIMVASTATIN 20 MG
TABLET ORAL
Qty: 90 TABLET | Refills: 3 | Status: SHIPPED | OUTPATIENT
Start: 2019-11-26 | End: 2020-11-30 | Stop reason: ALTCHOICE

## 2020-04-27 ENCOUNTER — TELEPHONE (OUTPATIENT)
Dept: INTERNAL MEDICINE | Facility: CLINIC | Age: 85
End: 2020-04-27

## 2020-07-29 NOTE — ANESTHESIA POSTPROCEDURE EVALUATION
Patient: Max Shah    Procedure Summary     Date Anesthesia Start Anesthesia Stop Room / Location    02/28/17 1300 1353 [facility]       Procedure Diagnosis Surgeon Provider    RT CATARACT PHACO EXTRACTION WITH INTRAOCULAR LENS IMPLANT (Right Eye) No diagnosis on file. MD Joe Dugan MD          Anesthesia Type: MAC  Last vitals  /92 (02/28/17 1418)    Temp      Pulse 86 (02/28/17 1418)   Resp 20 (02/28/17 1418)    SpO2 95 % (02/28/17 1418)      Post Anesthesia Care and Evaluation    Patient location during evaluation: bedside  Patient participation: complete - patient participated  Level of consciousness: awake  Pain score: 1  Pain management: adequate  Airway patency: patent  Anesthetic complications: No anesthetic complications    Cardiovascular status: acceptable  Respiratory status: acceptable  Hydration status: acceptable       Yes

## 2020-08-31 ENCOUNTER — OFFICE VISIT (OUTPATIENT)
Dept: INTERNAL MEDICINE | Facility: CLINIC | Age: 85
End: 2020-08-31

## 2020-08-31 VITALS
SYSTOLIC BLOOD PRESSURE: 126 MMHG | HEART RATE: 70 BPM | HEIGHT: 70 IN | DIASTOLIC BLOOD PRESSURE: 78 MMHG | BODY MASS INDEX: 31.5 KG/M2 | WEIGHT: 220 LBS | OXYGEN SATURATION: 93 %

## 2020-08-31 DIAGNOSIS — E78.49 OTHER HYPERLIPIDEMIA: ICD-10-CM

## 2020-08-31 DIAGNOSIS — Z00.00 MEDICARE ANNUAL WELLNESS VISIT, SUBSEQUENT: Primary | ICD-10-CM

## 2020-08-31 DIAGNOSIS — I25.10 CORONARY ARTERY DISEASE INVOLVING NATIVE CORONARY ARTERY OF NATIVE HEART WITHOUT ANGINA PECTORIS: ICD-10-CM

## 2020-08-31 DIAGNOSIS — R53.82 CHRONIC FATIGUE: ICD-10-CM

## 2020-08-31 DIAGNOSIS — R73.09 ELEVATED GLUCOSE: ICD-10-CM

## 2020-08-31 DIAGNOSIS — R29.898 LEG WEAKNESS, BILATERAL: ICD-10-CM

## 2020-08-31 DIAGNOSIS — I10 ESSENTIAL HYPERTENSION: ICD-10-CM

## 2020-08-31 PROCEDURE — G0439 PPPS, SUBSEQ VISIT: HCPCS | Performed by: INTERNAL MEDICINE

## 2020-08-31 NOTE — PATIENT INSTRUCTIONS
Medicare Wellness  Personal Prevention Plan of Service     Date of Office Visit:  2020  Encounter Provider:  Brianne Solomon MD  Place of Service:  [facility] INTERNAL MEDICINE  Patient Name: Max Shah  :  1934    As part of the Medicare Wellness portion of your visit today, we are providing you with this personalized preventive plan of services (PPPS). This plan is based upon recommendations of the [place] Preventive Services Task Force (USPSTF) and the Advisory Committee on Immunization Practices (ACIP).    This lists the preventive care services that should be considered, and provides dates of when you are due. Items listed as completed are up-to-date and do not require any further intervention.    Health Maintenance   Topic Date Due   • TDAP/TD VACCINES (1 - Tdap) 1945   • Pneumococcal Vaccine Once at 65 Years Old  1999   • LIPID PANEL  2020   • INFLUENZA VACCINE  2020   • ZOSTER VACCINE (2 of 2) 2021 (Originally 2013)   • MEDICARE ANNUAL WELLNESS  2021       Orders Placed This Encounter   Procedures   • Comprehensive Metabolic Panel   • Lipid Panel   • Urinalysis With Microscopic If Indicated (No Culture) - Urine, Clean Catch   • TSH Rfx On Abnormal To Free T4   • Vitamin B12 & Folate   • CBC & Differential     Order Specific Question:   Manual Differential     Answer:   No       No follow-ups on file.

## 2020-08-31 NOTE — PROGRESS NOTES
The ABCs of the Annual Wellness Visit  Subsequent Medicare Wellness Visit    Chief Complaint   Patient presents with   • Medicare Wellness-subsequent       Subjective   History of Present Illness:  Max Shah is a 86 y.o. male who presents for a Subsequent Medicare Wellness Visit.    HEALTH RISK ASSESSMENT    Recent Hospitalizations:  No hospitalization(s) within the last year.    Current Medical Providers:  Patient Care Team:  Brianne Solomon MD as PCP - General (Internal Medicine)  Isaias Rubalcava MD as PCP - Claims Attributed  Lulú Bettencourt MD as Consulting Physician (Cardiology)  Bear Boyd MD as Consulting Physician (Otolaryngology)  Skip Malik MD as Consulting Physician (Gastroenterology)  Clarke Bettencourt MD as Consulting Physician (Dermatology)  Audrey Elena MD as Consulting Physician (Vascular Surgery)    Smoking Status:  Social History     Tobacco Use   Smoking Status Never Smoker   Smokeless Tobacco Never Used       Alcohol Consumption:  Social History     Substance and Sexual Activity   Alcohol Use Yes    Comment: moderate       Depression Screen:   PHQ-2/PHQ-9 Depression Screening 8/31/2020   Little interest or pleasure in doing things 0   Feeling down, depressed, or hopeless 0   Trouble falling or staying asleep, or sleeping too much 0   Feeling tired or having little energy 3   Poor appetite or overeating 0   Feeling bad about yourself - or that you are a failure or have let yourself or your family down 0   Trouble concentrating on things, such as reading the newspaper or watching television 0   Moving or speaking so slowly that other people could have noticed. Or the opposite - being so fidgety or restless that you have been moving around a lot more than usual 0   Thoughts that you would be better off dead, or of hurting yourself in some way 0   Total Score 3   If you checked off any problems, how difficult have these problems made it for you to do your  work, take care of things at home, or get along with other people? Very difficult       Fall Risk Screen:  NIRAJ Fall Risk Assessment was completed, and patient is at MODERATE risk for falls. Assessment completed on:8/31/2020    Health Habits and Functional and Cognitive Screening:  Functional & Cognitive Status 8/31/2020   Do you have difficulty preparing food and eating? No   Do you have difficulty bathing yourself, getting dressed or grooming yourself? No   Do you have difficulty using the toilet? No   Do you have difficulty moving around from place to place? No   Do you have trouble with steps or getting out of a bed or a chair? Yes   Current Diet -   Dental Exam -   Eye Exam -   Exercise (times per week) -   Current Exercise Activities Include -   Do you need help using the phone?  No   Are you deaf or do you have serious difficulty hearing?  No   Do you need help with transportation? No   Do you need help shopping? No   Do you need help preparing meals?  No   Do you need help with housework?  No   Do you need help with laundry? No   Do you need help taking your medications? No   Do you need help managing money? No   Do you ever drive or ride in a car without wearing a seat belt? No   Have you felt unusual stress, anger or loneliness in the last month? No   Who do you live with? Spouse   If you need help, do you have trouble finding someone available to you? No   Have you been bothered in the last four weeks by sexual problems? No   Do you have difficulty concentrating, remembering or making decisions? No         Does the patient have evidence of cognitive impairment? No    Asprin use counseling:Taking ASA appropriately as indicated    Age-appropriate Screening Schedule:  Refer to the list below for future screening recommendations based on patient's age, sex and/or medical conditions. Orders for these recommended tests are listed in the plan section. The patient has been provided with a written  plan.    Health Maintenance   Topic Date Due   • TDAP/TD VACCINES (1 - Tdap) 03/01/1945   • LIPID PANEL  08/12/2020   • INFLUENZA VACCINE  08/01/2020   • ZOSTER VACCINE (2 of 2) 09/09/2021 (Originally 1/7/2013)          The following portions of the patient's history were reviewed and updated as appropriate: allergies, current medications, past family history, past medical history, past social history, past surgical history and problem list.    Outpatient Medications Prior to Visit   Medication Sig Dispense Refill   • aspirin 81 MG EC tablet Take 81 mg by mouth daily.     • diltiazem (TIAZAC) 120 MG 24 hr capsule Take 120 mg by mouth.     • losartan (COZAAR) 100 MG tablet TAKE 1 TABLET EVERY DAY 90 tablet 3   • Multiple Vitamin (MULTI-VITAMIN DAILY PO) Take 1 tablet by mouth Daily.     • nitroglycerin (NITROSTAT) 0.4 MG SL tablet PLACE 1 TABLET UNDER THE TONGUE EVERY 5 (FIVE) MINUTES AS NEEDED FOR CHEST PAIN.  3   • simvastatin (ZOCOR) 20 MG tablet TAKE 1 TABLET BY MOUTH EVERY DAY 90 tablet 3   • Aspirin Buf,CaCarb-MgCarb-MgO, 81 MG tablet Take 81 mg by mouth Daily.       No facility-administered medications prior to visit.        Patient Active Problem List   Diagnosis   • Hypertension   • CAD (coronary artery disease)   • GERD (gastroesophageal reflux disease)   • Fatigue   • Hand pain, right   • Stenosis of carotid artery   • Diastolic dysfunction   • Hyperlipidemia   • Pulmonary hypertension (CMS/HCC)   • Obstructive sleep apnea   • Lightheadedness   • Paresthesia of both feet   • Elevated glucose   • Leg weakness, bilateral       Advanced Care Planning:  ACP discussion was held with the patient during this visit. Patient does not have an advance directive, declines further assistance.    Review of Systems    Compared to one year ago, the patient feels his physical health is worse.  Compared to one year ago, the patient feels his mental health is the same.    Reviewed chart for potential of high risk medication  "in the elderly: yes  Reviewed chart for potential of harmful drug interactions in the elderly:yes    Objective         Vitals:    08/31/20 1451   BP: 126/78   BP Location: Left arm   Patient Position: Sitting   Cuff Size: Adult   Pulse: 70   SpO2: 93%   Weight: 99.8 kg (220 lb)   Height: 176.5 cm (69.5\")       Body mass index is 32.02 kg/m².  Discussed the patient's BMI with him. The BMI is above average; BMI management plan is completed.    Physical Exam          Assessment/Plan   Medicare Risks and Personalized Health Plan  CMS Preventative Services Quick Reference  Advance Directive Discussion  Cardiovascular risk  Fall Risk  Hearing Problem  Inactivity/Sedentary  Obesity/Overweight     The above risks/problems have been discussed with the patient.  Pertinent information has been shared with the patient in the After Visit Summary.  Follow up plans and orders are seen below in the Assessment/Plan Section.    Diagnoses and all orders for this visit:    1. Medicare annual wellness visit, subsequent (Primary)    2. Essential hypertension  Comments:  controlled - call if bp over 140/90  Orders:  -     CBC & Differential  -     Comprehensive Metabolic Panel  -     Lipid Panel  -     Urinalysis With Microscopic If Indicated (No Culture) - Urine, Clean Catch    3. Coronary artery disease involving native coronary artery of native heart without angina pectoris  Comments:  f/u with cardiol    4. Other hyperlipidemia  Comments:  need diet/ex    5. Chronic fatigue  Comments:  labs today - call if worse  Orders:  -     TSH Rfx On Abnormal To Free T4  -     Vitamin B12 & Folate    6. Elevated glucose  Comments:  need low sugar diet  Orders:  -     Hemoglobin A1c    7. Leg weakness, bilateral  Comments:  worse - refer to PT  Orders:  -     TSH Rfx On Abnormal To Free T4  -     Vitamin B12 & Folate  -     Ambulatory Referral to Physical Therapy Evaluate and treat, Ortho      Follow Up:  Return in about 6 months (around " 2/28/2021) for Recheck.     An After Visit Summary and PPPS were given to the patient.        Need daily strengthening & balance exercises (shown today).   Need Tdap & pneu 23 vaccine.

## 2020-09-01 LAB
ALBUMIN SERPL-MCNC: 4.5 G/DL (ref 3.5–5.2)
ALBUMIN/GLOB SERPL: 1.9 G/DL
ALP SERPL-CCNC: 83 U/L (ref 39–117)
ALT SERPL-CCNC: 10 U/L (ref 1–41)
APPEARANCE UR: ABNORMAL
AST SERPL-CCNC: 16 U/L (ref 1–40)
BACTERIA #/AREA URNS HPF: NORMAL /HPF
BASOPHILS # BLD AUTO: 0.03 10*3/MM3 (ref 0–0.2)
BASOPHILS NFR BLD AUTO: 0.3 % (ref 0–1.5)
BILIRUB SERPL-MCNC: 1 MG/DL (ref 0–1.2)
BILIRUB UR QL STRIP: NEGATIVE
BUN SERPL-MCNC: 25 MG/DL (ref 8–23)
BUN/CREAT SERPL: 25 (ref 7–25)
CALCIUM SERPL-MCNC: 9.6 MG/DL (ref 8.6–10.5)
CASTS URNS MICRO: NORMAL
CHLORIDE SERPL-SCNC: 104 MMOL/L (ref 98–107)
CHOLEST SERPL-MCNC: 130 MG/DL (ref 0–200)
CO2 SERPL-SCNC: 27.3 MMOL/L (ref 22–29)
COLOR UR: ABNORMAL
CREAT SERPL-MCNC: 1 MG/DL (ref 0.76–1.27)
EOSINOPHIL # BLD AUTO: 0.25 10*3/MM3 (ref 0–0.4)
EOSINOPHIL NFR BLD AUTO: 2.7 % (ref 0.3–6.2)
EPI CELLS #/AREA URNS HPF: NORMAL /HPF
ERYTHROCYTE [DISTWIDTH] IN BLOOD BY AUTOMATED COUNT: 12 % (ref 12.3–15.4)
FOLATE SERPL-MCNC: >20 NG/ML (ref 4.78–24.2)
GLOBULIN SER CALC-MCNC: 2.4 GM/DL
GLUCOSE SERPL-MCNC: 98 MG/DL (ref 65–99)
GLUCOSE UR QL: NEGATIVE
HBA1C MFR BLD: 5.5 % (ref 4.8–5.6)
HCT VFR BLD AUTO: 43.9 % (ref 37.5–51)
HDLC SERPL-MCNC: 51 MG/DL (ref 40–60)
HGB BLD-MCNC: 15 G/DL (ref 13–17.7)
HGB UR QL STRIP: NEGATIVE
IMM GRANULOCYTES # BLD AUTO: 0.07 10*3/MM3 (ref 0–0.05)
IMM GRANULOCYTES NFR BLD AUTO: 0.7 % (ref 0–0.5)
KETONES UR QL STRIP: NEGATIVE
LDLC SERPL CALC-MCNC: 66 MG/DL (ref 0–100)
LEUKOCYTE ESTERASE UR QL STRIP: ABNORMAL
LYMPHOCYTES # BLD AUTO: 1.65 10*3/MM3 (ref 0.7–3.1)
LYMPHOCYTES NFR BLD AUTO: 17.6 % (ref 19.6–45.3)
MCH RBC QN AUTO: 30.4 PG (ref 26.6–33)
MCHC RBC AUTO-ENTMCNC: 34.2 G/DL (ref 31.5–35.7)
MCV RBC AUTO: 88.9 FL (ref 79–97)
MONOCYTES # BLD AUTO: 0.6 10*3/MM3 (ref 0.1–0.9)
MONOCYTES NFR BLD AUTO: 6.4 % (ref 5–12)
NEUTROPHILS # BLD AUTO: 6.8 10*3/MM3 (ref 1.7–7)
NEUTROPHILS NFR BLD AUTO: 72.3 % (ref 42.7–76)
NITRITE UR QL STRIP: NEGATIVE
NRBC BLD AUTO-RTO: 0 /100 WBC (ref 0–0.2)
PH UR STRIP: 6 [PH] (ref 5–8)
PLATELET # BLD AUTO: 203 10*3/MM3 (ref 140–450)
POTASSIUM SERPL-SCNC: 4.3 MMOL/L (ref 3.5–5.2)
PROT SERPL-MCNC: 6.9 G/DL (ref 6–8.5)
PROT UR QL STRIP: NEGATIVE
RBC # BLD AUTO: 4.94 10*6/MM3 (ref 4.14–5.8)
RBC #/AREA URNS HPF: NORMAL /HPF
SODIUM SERPL-SCNC: 140 MMOL/L (ref 136–145)
SP GR UR: 1.02 (ref 1–1.03)
TRIGL SERPL-MCNC: 66 MG/DL (ref 0–150)
TSH SERPL DL<=0.005 MIU/L-ACNC: 1.72 UIU/ML (ref 0.27–4.2)
UROBILINOGEN UR STRIP-MCNC: ABNORMAL MG/DL
VIT B12 SERPL-MCNC: 602 PG/ML (ref 211–946)
VLDLC SERPL CALC-MCNC: 13.2 MG/DL
WBC # BLD AUTO: 9.4 10*3/MM3 (ref 3.4–10.8)
WBC #/AREA URNS HPF: NORMAL /HPF

## 2020-11-30 RX ORDER — SIMVASTATIN 20 MG
TABLET ORAL
Qty: 90 TABLET | Refills: 3 | OUTPATIENT
Start: 2020-11-30

## 2020-11-30 RX ORDER — LOSARTAN POTASSIUM 100 MG/1
TABLET ORAL
Qty: 90 TABLET | Refills: 3 | Status: SHIPPED | OUTPATIENT
Start: 2020-11-30 | End: 2021-11-15

## 2020-11-30 RX ORDER — PRAVASTATIN SODIUM 40 MG
40 TABLET ORAL DAILY
Qty: 90 TABLET | Refills: 3 | Status: SHIPPED | OUTPATIENT
Start: 2020-11-30 | End: 2021-10-26

## 2021-04-05 ENCOUNTER — DOCUMENTATION (OUTPATIENT)
Dept: INTERNAL MEDICINE | Facility: CLINIC | Age: 86
End: 2021-04-05

## 2021-04-07 NOTE — PROGRESS NOTES
Mailed patient a letter with a new appointment date and time, as provider will be out of the office on 4/15/2021

## 2021-04-26 ENCOUNTER — OFFICE VISIT (OUTPATIENT)
Dept: INTERNAL MEDICINE | Facility: CLINIC | Age: 86
End: 2021-04-26

## 2021-04-26 VITALS
HEART RATE: 86 BPM | OXYGEN SATURATION: 97 % | TEMPERATURE: 98.6 F | DIASTOLIC BLOOD PRESSURE: 74 MMHG | HEIGHT: 70 IN | WEIGHT: 215 LBS | SYSTOLIC BLOOD PRESSURE: 120 MMHG | BODY MASS INDEX: 30.78 KG/M2

## 2021-04-26 DIAGNOSIS — E78.49 OTHER HYPERLIPIDEMIA: ICD-10-CM

## 2021-04-26 DIAGNOSIS — R73.09 ELEVATED GLUCOSE: ICD-10-CM

## 2021-04-26 DIAGNOSIS — G47.33 OBSTRUCTIVE SLEEP APNEA: ICD-10-CM

## 2021-04-26 DIAGNOSIS — I10 ESSENTIAL HYPERTENSION: Primary | ICD-10-CM

## 2021-04-26 DIAGNOSIS — I25.10 CORONARY ARTERY DISEASE INVOLVING NATIVE CORONARY ARTERY OF NATIVE HEART WITHOUT ANGINA PECTORIS: ICD-10-CM

## 2021-04-26 LAB
ALBUMIN SERPL-MCNC: 4.1 G/DL (ref 3.5–5.2)
ALBUMIN/GLOB SERPL: 1.6 G/DL
ALP SERPL-CCNC: 89 U/L (ref 39–117)
ALT SERPL-CCNC: 11 U/L (ref 1–41)
AST SERPL-CCNC: 15 U/L (ref 1–40)
BASOPHILS # BLD AUTO: 0.03 10*3/MM3 (ref 0–0.2)
BASOPHILS NFR BLD AUTO: 0.4 % (ref 0–1.5)
BILIRUB SERPL-MCNC: 0.8 MG/DL (ref 0–1.2)
BUN SERPL-MCNC: 24 MG/DL (ref 8–23)
BUN/CREAT SERPL: 28.2 (ref 7–25)
CALCIUM SERPL-MCNC: 9.8 MG/DL (ref 8.6–10.5)
CHLORIDE SERPL-SCNC: 103 MMOL/L (ref 98–107)
CHOLEST SERPL-MCNC: 130 MG/DL (ref 0–200)
CO2 SERPL-SCNC: 27.2 MMOL/L (ref 22–29)
CREAT SERPL-MCNC: 0.85 MG/DL (ref 0.76–1.27)
EOSINOPHIL # BLD AUTO: 0.27 10*3/MM3 (ref 0–0.4)
EOSINOPHIL NFR BLD AUTO: 3.3 % (ref 0.3–6.2)
ERYTHROCYTE [DISTWIDTH] IN BLOOD BY AUTOMATED COUNT: 12 % (ref 12.3–15.4)
GLOBULIN SER CALC-MCNC: 2.6 GM/DL
GLUCOSE SERPL-MCNC: 107 MG/DL (ref 65–99)
HBA1C MFR BLD: 5.7 % (ref 4.8–5.6)
HCT VFR BLD AUTO: 45.8 % (ref 37.5–51)
HDLC SERPL-MCNC: 51 MG/DL (ref 40–60)
HGB BLD-MCNC: 15 G/DL (ref 13–17.7)
IMM GRANULOCYTES # BLD AUTO: 0.04 10*3/MM3 (ref 0–0.05)
IMM GRANULOCYTES NFR BLD AUTO: 0.5 % (ref 0–0.5)
LDLC SERPL CALC-MCNC: 63 MG/DL (ref 0–100)
LYMPHOCYTES # BLD AUTO: 1.54 10*3/MM3 (ref 0.7–3.1)
LYMPHOCYTES NFR BLD AUTO: 18.9 % (ref 19.6–45.3)
MCH RBC QN AUTO: 30.9 PG (ref 26.6–33)
MCHC RBC AUTO-ENTMCNC: 32.8 G/DL (ref 31.5–35.7)
MCV RBC AUTO: 94.2 FL (ref 79–97)
MONOCYTES # BLD AUTO: 0.79 10*3/MM3 (ref 0.1–0.9)
MONOCYTES NFR BLD AUTO: 9.7 % (ref 5–12)
NEUTROPHILS # BLD AUTO: 5.47 10*3/MM3 (ref 1.7–7)
NEUTROPHILS NFR BLD AUTO: 67.2 % (ref 42.7–76)
NRBC BLD AUTO-RTO: 0 /100 WBC (ref 0–0.2)
PLATELET # BLD AUTO: 189 10*3/MM3 (ref 140–450)
POTASSIUM SERPL-SCNC: 4.3 MMOL/L (ref 3.5–5.2)
PROT SERPL-MCNC: 6.7 G/DL (ref 6–8.5)
RBC # BLD AUTO: 4.86 10*6/MM3 (ref 4.14–5.8)
SODIUM SERPL-SCNC: 140 MMOL/L (ref 136–145)
TRIGL SERPL-MCNC: 83 MG/DL (ref 0–150)
VLDLC SERPL CALC-MCNC: 16 MG/DL (ref 5–40)
WBC # BLD AUTO: 8.14 10*3/MM3 (ref 3.4–10.8)

## 2021-04-26 PROCEDURE — 99213 OFFICE O/P EST LOW 20 MIN: CPT | Performed by: INTERNAL MEDICINE

## 2021-04-26 NOTE — PROGRESS NOTES
"Chief Complaint  Hypertension (8 month follow up), Hyperlipidemia, and Sleep Apnea    Subjective          Max Shah presents to [facility] for   Hypertension  This is a chronic problem. The current episode started more than 1 year ago. The problem is unchanged. The problem is controlled. Identifiable causes of hypertension include sleep apnea.   Hyperlipidemia  This is a chronic problem. The current episode started more than 1 year ago. The problem is controlled. Recent lipid tests were reviewed and are normal. He has no history of diabetes.   Sleep Apnea  This is a chronic problem. The current episode started more than 1 year ago. The problem occurs constantly. The problem has been unchanged.       Review of Systems     Objective   Vital Signs:   /74 (BP Location: Left arm, Patient Position: Sitting, Cuff Size: Adult)   Pulse 86   Temp 98.6 °F (37 °C)   Ht 176.5 cm (69.5\")   Wt 97.5 kg (215 lb)   SpO2 97%   BMI 31.29 kg/m²     Physical Exam  Vitals and nursing note reviewed.   Constitutional:       General: He is not in acute distress.     Appearance: He is well-developed.   Cardiovascular:      Rate and Rhythm: Normal rate and regular rhythm.      Heart sounds: Normal heart sounds.   Pulmonary:      Effort: No respiratory distress.      Breath sounds: No wheezing or rales.   Chest:      Chest wall: No tenderness.   Psychiatric:         Behavior: Behavior normal.        Result Review :                 Assessment and Plan    Problem List Items Addressed This Visit        Unprioritized    Hypertension - Primary    Current Assessment & Plan     Hypertension is improving with treatment.  Continue current treatment regimen.  Dietary sodium restriction.  Weight loss.  Regular aerobic exercise.  Blood pressure will be reassessed at the next regular appointment.         Relevant Orders    CBC & Differential    Comprehensive Metabolic Panel    Lipid Panel    CAD (coronary " artery disease)    Overview     Seeing dr daysi rodas         Current Assessment & Plan     Coronary artery disease is improving with treatment.  Continue current treatment regimen.  Dietary sodium restriction.  Weight loss.  Regular aerobic exercise.  Cardiac status will be reassessed in 6 months.         Hyperlipidemia    Current Assessment & Plan     Lipid abnormalities are improving with treatment.  Nutritional counseling was provided.  Lipids will be reassessed in 6 months.         Relevant Orders    Comprehensive Metabolic Panel    Lipid Panel    Obstructive sleep apnea    Overview     Overview:   compliant         Current Assessment & Plan     Continue CPAP nightly         Elevated glucose    Current Assessment & Plan     Need low sugar diet         Relevant Orders    Hemoglobin A1c          Follow Up   Return in about 6 months (around 10/26/2021) for Medicare Wellness.  Patient was given instructions and counseling regarding his condition or for health maintenance advice. Please see specific information pulled into the AVS if appropriate.

## 2021-04-26 NOTE — ASSESSMENT & PLAN NOTE
Coronary artery disease is improving with treatment.  Continue current treatment regimen.  Dietary sodium restriction.  Weight loss.  Regular aerobic exercise.  Cardiac status will be reassessed in 6 months.

## 2021-05-21 RX ORDER — SIMVASTATIN 20 MG
TABLET ORAL
Qty: 90 TABLET | Refills: 3 | OUTPATIENT
Start: 2021-05-21

## 2021-05-27 RX ORDER — SIMVASTATIN 20 MG
TABLET ORAL
Qty: 90 TABLET | Refills: 3 | OUTPATIENT
Start: 2021-05-27

## 2021-06-01 ENCOUNTER — TELEPHONE (OUTPATIENT)
Dept: INTERNAL MEDICINE | Facility: CLINIC | Age: 86
End: 2021-06-01

## 2021-06-01 NOTE — TELEPHONE ENCOUNTER
Called and left a msg on the pt's voicemail that the office is booked and he would need to be seen/treated at an immediate/urgent care or [facility].

## 2021-06-01 NOTE — TELEPHONE ENCOUNTER
Caller: Max Shah    Relationship: Self    Best call back number: [phone]     What medication are you requesting: ANY    What are your current symptoms: FREQUENT / BURNING URINATION    How long have you been experiencing symptoms: 4 DAYS    Have you had these symptoms before:    [x] Yes  [] No    Have you been treated for these symptoms before:   [x] Yes  [] No    If a prescription is needed, what is your preferred pharmacy and phone number: [id number] - [place], [place] - [address] [phone] [facility] [phone]

## 2021-08-02 ENCOUNTER — TELEPHONE (OUTPATIENT)
Dept: INTERNAL MEDICINE | Facility: CLINIC | Age: 86
End: 2021-08-02

## 2021-08-02 NOTE — TELEPHONE ENCOUNTER
Patient was advised that there is no one that come to the home to vaccinate for COVID. I provided him the days/time that he can come over to [facility] for his vaccination.    He than asked if [facility] can send someone to his home to vaccinate and I again informed him that this type of service is not available at all.    He verbalized he understood.

## 2021-08-02 NOTE — TELEPHONE ENCOUNTER
PATIENT STATES NEEDS SOMEONE TO COME TO HIS HOUSE TO ADMINISTER THE COVID VACCINE FOR HIM AND HIS WIFE BECAUSE IT IS IMPOSSIBLE FOR HIM AND HIS WIFE TO GO THE HOSPITAL TO GET IT ADMINISTERED THERE.    PATIENT WOULD LIKE DR PETER TO HELP MAKE THIS HAPPEN ASAP.    CONTACT: [phone] (H)

## 2021-08-17 DIAGNOSIS — E78.49 OTHER HYPERLIPIDEMIA: Primary | ICD-10-CM

## 2021-08-17 RX ORDER — SIMVASTATIN 20 MG
TABLET ORAL
Qty: 90 TABLET | Refills: 1 | Status: SHIPPED | OUTPATIENT
Start: 2021-08-17 | End: 2022-03-14 | Stop reason: SDUPTHER

## 2021-10-26 ENCOUNTER — OFFICE VISIT (OUTPATIENT)
Dept: INTERNAL MEDICINE | Facility: CLINIC | Age: 86
End: 2021-10-26

## 2021-10-26 VITALS
TEMPERATURE: 98.2 F | RESPIRATION RATE: 19 BRPM | WEIGHT: 205 LBS | DIASTOLIC BLOOD PRESSURE: 80 MMHG | HEART RATE: 72 BPM | OXYGEN SATURATION: 94 % | HEIGHT: 70 IN | SYSTOLIC BLOOD PRESSURE: 138 MMHG | BODY MASS INDEX: 29.35 KG/M2

## 2021-10-26 DIAGNOSIS — R73.09 ELEVATED GLUCOSE: ICD-10-CM

## 2021-10-26 DIAGNOSIS — R53.82 CHRONIC FATIGUE: ICD-10-CM

## 2021-10-26 DIAGNOSIS — E78.49 OTHER HYPERLIPIDEMIA: ICD-10-CM

## 2021-10-26 DIAGNOSIS — I10 PRIMARY HYPERTENSION: ICD-10-CM

## 2021-10-26 DIAGNOSIS — I25.10 CORONARY ARTERY DISEASE INVOLVING NATIVE CORONARY ARTERY OF NATIVE HEART WITHOUT ANGINA PECTORIS: ICD-10-CM

## 2021-10-26 DIAGNOSIS — R20.2 PARESTHESIA OF BOTH FEET: ICD-10-CM

## 2021-10-26 DIAGNOSIS — Z00.00 MEDICARE ANNUAL WELLNESS VISIT, SUBSEQUENT: Primary | ICD-10-CM

## 2021-10-26 DIAGNOSIS — R26.89 BALANCE DISORDER: ICD-10-CM

## 2021-10-26 PROCEDURE — 1170F FXNL STATUS ASSESSED: CPT | Performed by: INTERNAL MEDICINE

## 2021-10-26 PROCEDURE — 99214 OFFICE O/P EST MOD 30 MIN: CPT | Performed by: INTERNAL MEDICINE

## 2021-10-26 PROCEDURE — 1126F AMNT PAIN NOTED NONE PRSNT: CPT | Performed by: INTERNAL MEDICINE

## 2021-10-26 PROCEDURE — G0439 PPPS, SUBSEQ VISIT: HCPCS | Performed by: INTERNAL MEDICINE

## 2021-10-26 PROCEDURE — 1159F MED LIST DOCD IN RCRD: CPT | Performed by: INTERNAL MEDICINE

## 2021-10-26 NOTE — PROGRESS NOTES
"Chief Complaint  Medicare Wellness-subsequent (AWV), Hypertension, Hyperlipidemia, Hyperglycemia, and Balance Issues (worse now, but had improved in past with PT)    Subjective          Max Shah presents to [facility] for   Hypertension  This is a chronic problem. The current episode started more than 1 year ago. The problem is unchanged. The problem is controlled.   Hyperlipidemia  This is a chronic problem. The current episode started more than 1 year ago. The problem is controlled. Recent lipid tests were reviewed and are normal. He has no history of diabetes.   Hyperglycemia  This is a recurrent problem. The current episode started more than 1 year ago. The problem occurs intermittently.       Review of Systems     Objective   Vital Signs:   /80 (BP Location: Left arm, Patient Position: Sitting, Cuff Size: Large Adult)   Pulse 72   Temp 98.2 °F (36.8 °C) (Temporal)   Resp 19   Ht 176.5 cm (69.5\")   Wt 93 kg (205 lb)   SpO2 94%   BMI 29.84 kg/m²     Physical Exam  Vitals and nursing note reviewed.   Constitutional:       General: He is not in acute distress.     Appearance: He is well-developed.   Cardiovascular:      Rate and Rhythm: Normal rate and regular rhythm.      Heart sounds: Normal heart sounds.   Pulmonary:      Effort: No respiratory distress.      Breath sounds: No wheezing or rales.   Chest:      Chest wall: No tenderness.   Psychiatric:         Behavior: Behavior normal.       Wide based gait, unsteady.    Result Review :                 Assessment and Plan    Problem List Items Addressed This Visit        Unprioritized    Hypertension    Current Assessment & Plan     Hypertension is improving with treatment.  Continue current treatment regimen.  Dietary sodium restriction.  Regular aerobic exercise.  Continue current medications.  Blood pressure will be reassessed at the next regular appointment.         Relevant Orders    Comprehensive Metabolic " Panel    Lipid Panel    CAD (coronary artery disease)    Overview     Seeing dr daysi rodas         Fatigue    Overview     Better with cpap         Current Assessment & Plan     Discussed daily exercise - call if worse         Relevant Orders    TSH Rfx On Abnormal To Free T4    Hyperlipidemia    Current Assessment & Plan     Lipid abnormalities are improving with lifestyle modifications.  Nutritional counseling was provided.  Lipids will be reassessed in 6 months.         Paresthesia of both feet    Overview     For years - need strict control of sugar.         Current Assessment & Plan     Seen by podiatrist in the past - call if worsening.         Elevated glucose    Current Assessment & Plan     Need low sugar diet         Relevant Orders    Hemoglobin A1c    Balance disorder    Current Assessment & Plan     Worse again - refer to PT.           Other Visit Diagnoses     Medicare annual wellness visit, subsequent    -  Primary          Follow Up   Return in about 6 months (around 4/26/2022) for Recheck.  Patient was given instructions and counseling regarding his condition or for health maintenance advice. Please see specific information pulled into the AVS if appropriate.      He requested 2nd visit today to discuss multiple problems.

## 2021-10-26 NOTE — PROGRESS NOTES
The ABCs of the Annual Wellness Visit  Subsequent Medicare Wellness Visit    Chief Complaint   Patient presents with   • Medicare Wellness-subsequent     AWV      Subjective    History of Present Illness:  Max Shah is a 87 y.o. male who presents for a Subsequent Medicare Wellness Visit.    The following portions of the patient's history were reviewed and   updated as appropriate: allergies, current medications, past family history, past medical history, past social history, past surgical history and problem list.    Compared to one year ago, the patient feels his physical   health is the same.    Compared to one year ago, the patient feels his mental   health is the same.    Recent Hospitalizations:  He was not admitted to the hospital during the last year.       Current Medical Providers:  Patient Care Team:  Brianne Solomon MD as PCP - General (Internal Medicine)  Lulú Bettencourt MD as Consulting Physician (Cardiology)  Bear Boyd MD as Consulting Physician (Otolaryngology)  Skip Malik MD as Consulting Physician (Gastroenterology)  Clarke Bettencourt MD as Consulting Physician (Dermatology)  Audrey Elena MD as Consulting Physician (Vascular Surgery)    Outpatient Medications Prior to Visit   Medication Sig Dispense Refill   • aspirin 81 MG EC tablet Take 81 mg by mouth daily.     • diltiazem (TIAZAC) 120 MG 24 hr capsule Take 120 mg by mouth.     • losartan (COZAAR) 100 MG tablet TAKE 1 TABLET BY MOUTH EVERY DAY 90 tablet 3   • Multiple Vitamin (MULTI-VITAMIN DAILY PO) Take 1 tablet by mouth Daily.     • nitroglycerin (NITROSTAT) 0.4 MG SL tablet PLACE 1 TABLET UNDER THE TONGUE EVERY 5 (FIVE) MINUTES AS NEEDED FOR CHEST PAIN.  3   • pravastatin (PRAVACHOL) 40 MG tablet Take 1 tablet by mouth Daily. Instead of simvastatin. 90 tablet 3   • simvastatin (ZOCOR) 20 MG tablet TAKE 1 TABLET BY MOUTH EVERY DAY 90 tablet 1     No facility-administered medications prior to visit.  "      No opioid medication identified on active medication list. I have reviewed chart for other potential  high risk medication/s and harmful drug interactions in the elderly.          Aspirin is on active medication list. Aspirin use is not indicated based on review of current medical condition/s. Risk of harm outweighs potential benefits. Patient instructed to discontinue this medication.  .      Patient Active Problem List   Diagnosis   • Hypertension   • CAD (coronary artery disease)   • GERD (gastroesophageal reflux disease)   • Fatigue   • Hand pain, right   • Stenosis of carotid artery   • Diastolic dysfunction   • Hyperlipidemia   • Pulmonary hypertension (HCC)   • Obstructive sleep apnea   • Lightheadedness   • Paresthesia of both feet   • Elevated glucose   • Leg weakness, bilateral     Advance Care Planning  Advance Directive is not on file.  ACP discussion was held with the patient during this visit. Patient does not have an advance directive, information provided.          Objective    Vitals:    10/26/21 1448   BP: 163/81   BP Location: Left arm   Patient Position: Sitting   Cuff Size: Large Adult   Pulse: 72   Resp: 19   Temp: 98.2 °F (36.8 °C)   TempSrc: Temporal   SpO2: 94%   Weight: 93 kg (205 lb)   Height: 176.5 cm (69.5\")   PainSc: 0-No pain     BMI Readings from Last 1 Encounters:   10/26/21 29.84 kg/m²   BMI is above normal parameters. Recommendations include: exercise counseling and nutrition counseling    Does the patient have evidence of cognitive impairment? No    Physical Exam            HEALTH RISK ASSESSMENT    Smoking Status:  Social History     Tobacco Use   Smoking Status Never Smoker   Smokeless Tobacco Never Used     Alcohol Consumption:  Social History     Substance and Sexual Activity   Alcohol Use Yes    Comment: moderate     Fall Risk Screen:    STEADI Fall Risk Assessment was completed, and patient is at LOW risk for falls.Assessment completed on:10/26/2021    Depression " Screening:  PHQ-2/PHQ-9 Depression Screening 10/26/2021   Little interest or pleasure in doing things 0   Feeling down, depressed, or hopeless 0   Trouble falling or staying asleep, or sleeping too much -   Feeling tired or having little energy -   Poor appetite or overeating -   Feeling bad about yourself - or that you are a failure or have let yourself or your family down -   Trouble concentrating on things, such as reading the newspaper or watching television -   Moving or speaking so slowly that other people could have noticed. Or the opposite - being so fidgety or restless that you have been moving around a lot more than usual -   Thoughts that you would be better off dead, or of hurting yourself in some way -   Total Score 0   If you checked off any problems, how difficult have these problems made it for you to do your work, take care of things at home, or get along with other people? -       Health Habits and Functional and Cognitive Screening:  Functional & Cognitive Status 10/26/2021   Do you have difficulty preparing food and eating? No   Do you have difficulty bathing yourself, getting dressed or grooming yourself? No   Do you have difficulty using the toilet? No   Do you have difficulty moving around from place to place? Yes   Do you have trouble with steps or getting out of a bed or a chair? Yes   Current Diet Well Balanced Diet   Dental Exam Up to date   Eye Exam Up to date   Exercise (times per week) 2 times per week   Current Exercises Include Weightlifting   Current Exercise Activities Include -   Do you need help using the phone?  No   Are you deaf or do you have serious difficulty hearing?  Yes   Do you need help with transportation? Yes   Do you need help shopping? No   Do you need help preparing meals?  No   Do you need help with housework?  No   Do you need help with laundry? No   Do you need help taking your medications? No   Do you need help managing money? No   Do you ever drive or ride in a  car without wearing a seat belt? No   Have you felt unusual stress, anger or loneliness in the last month? No   Who do you live with? Spouse   If you need help, do you have trouble finding someone available to you? No   Have you been bothered in the last four weeks by sexual problems? No   Do you have difficulty concentrating, remembering or making decisions? No       Age-appropriate Screening Schedule:  Refer to the list below for future screening recommendations based on patient's age, sex and/or medical conditions. Orders for these recommended tests are listed in the plan section. The patient has been provided with a written plan.    Health Maintenance   Topic Date Due   • ZOSTER VACCINE (2 of 2) 01/07/2013   • INFLUENZA VACCINE  08/01/2021   • LIPID PANEL  04/26/2022   • TDAP/TD VACCINES (2 - Td or Tdap) 02/08/2029              Assessment/Plan   CMS Preventative Services Quick Reference  Risk Factors Identified During Encounter  Fall Risk-High or Moderate  Inactivity/Sedentary  Obesity/Overweight   The above risks/problems have been discussed with the patient.  Follow up actions/plans if indicated are seen below in the Assessment/Plan Section.  Pertinent information has been shared with the patient in the After Visit Summary.    Diagnoses and all orders for this visit:    1. Medicare annual wellness visit, subsequent (Primary)        Follow Up:   Return in about 6 months (around 4/26/2022) for Recheck.     An After Visit Summary and PPPS were made available to the patient.                Need screening for AAA & carotid disease.  Information given today.   Need daily strengthening & balance exercises (shown today).

## 2021-10-26 NOTE — PATIENT INSTRUCTIONS
Medicare Wellness  Personal Prevention Plan of Service     Date of Office Visit:  10/26/2021  Encounter Provider:  Brianne Solomon MD  Place of Service:  [facility]  Patient Name: Max Shah  :  1934    As part of the Medicare Wellness portion of your visit today, we are providing you with this personalized preventive plan of services (PPPS). This plan is based upon recommendations of the [place] Preventive Services Task Force (USPSTF) and the Advisory Committee on Immunization Practices (ACIP).    This lists the preventive care services that should be considered, and provides dates of when you are due. Items listed as completed are up-to-date and do not require any further intervention.    Health Maintenance   Topic Date Due   • COVID-19 Vaccine (1) Never done   • ZOSTER VACCINE (2 of 2) 2013   • INFLUENZA VACCINE  2021   • LIPID PANEL  2022   • ANNUAL WELLNESS VISIT  10/26/2022   • TDAP/TD VACCINES (2 - Td or Tdap) 2029   • Pneumococcal Vaccine 65+  Completed       No orders of the defined types were placed in this encounter.      No follow-ups on file.

## 2021-10-27 LAB
ALBUMIN SERPL-MCNC: 4.5 G/DL (ref 3.6–4.6)
ALBUMIN/GLOB SERPL: 1.6 {RATIO} (ref 1.2–2.2)
ALP SERPL-CCNC: 103 IU/L (ref 44–121)
ALT SERPL-CCNC: 11 IU/L (ref 0–44)
AST SERPL-CCNC: 16 IU/L (ref 0–40)
BILIRUB SERPL-MCNC: 1.1 MG/DL (ref 0–1.2)
BUN SERPL-MCNC: 20 MG/DL (ref 8–27)
BUN/CREAT SERPL: 21 (ref 10–24)
CALCIUM SERPL-MCNC: 9.7 MG/DL (ref 8.6–10.2)
CHLORIDE SERPL-SCNC: 100 MMOL/L (ref 96–106)
CHOLEST SERPL-MCNC: 130 MG/DL (ref 100–199)
CO2 SERPL-SCNC: 24 MMOL/L (ref 20–29)
CREAT SERPL-MCNC: 0.94 MG/DL (ref 0.76–1.27)
GLOBULIN SER CALC-MCNC: 2.8 G/DL (ref 1.5–4.5)
GLUCOSE SERPL-MCNC: 102 MG/DL (ref 65–99)
HBA1C MFR BLD: 5.6 % (ref 4.8–5.6)
HDLC SERPL-MCNC: 51 MG/DL
LDLC SERPL CALC-MCNC: 65 MG/DL (ref 0–99)
Lab: NORMAL
POTASSIUM SERPL-SCNC: 4.5 MMOL/L (ref 3.5–5.2)
PROT SERPL-MCNC: 7.3 G/DL (ref 6–8.5)
SODIUM SERPL-SCNC: 139 MMOL/L (ref 134–144)
TRIGL SERPL-MCNC: 70 MG/DL (ref 0–149)
TSH SERPL DL<=0.005 MIU/L-ACNC: 2.36 UIU/ML (ref 0.45–4.5)
VLDLC SERPL CALC-MCNC: 14 MG/DL (ref 5–40)

## 2021-10-27 NOTE — PROGRESS NOTES
Borderline high sugar - need low sugar/carb diet & increased exercise. Other labs normal including thyroid & fats.

## 2021-11-11 ENCOUNTER — TELEPHONE (OUTPATIENT)
Dept: INTERNAL MEDICINE | Facility: CLINIC | Age: 86
End: 2021-11-11

## 2021-11-11 NOTE — TELEPHONE ENCOUNTER
Caller: Max Shah     Relationship to Patient: SELF    Phone Number: [phone]     Reason for Call: PATIENT WANTED TO LET DR. PETER KNOW THAT HE HASN'T RECEIVED A PHONE CALL FROM [facility] YET. HE HAS ASKED FOR HER TO SEND THE ORDER TO THEM.

## 2021-11-12 ENCOUNTER — TELEPHONE (OUTPATIENT)
Dept: INTERNAL MEDICINE | Facility: CLINIC | Age: 86
End: 2021-11-12

## 2021-11-12 DIAGNOSIS — R29.898 WEAKNESS OF BOTH LOWER EXTREMITIES: Primary | ICD-10-CM

## 2021-11-15 RX ORDER — LOSARTAN POTASSIUM 100 MG/1
TABLET ORAL
Qty: 90 TABLET | Refills: 3 | Status: SHIPPED | OUTPATIENT
Start: 2021-11-15 | End: 2022-03-03 | Stop reason: SDUPTHER

## 2021-12-10 ENCOUNTER — OFFICE VISIT (OUTPATIENT)
Dept: INTERNAL MEDICINE | Facility: CLINIC | Age: 86
End: 2021-12-10

## 2021-12-10 VITALS
HEIGHT: 70 IN | BODY MASS INDEX: 29.98 KG/M2 | WEIGHT: 209.4 LBS | TEMPERATURE: 97.7 F | SYSTOLIC BLOOD PRESSURE: 140 MMHG | OXYGEN SATURATION: 95 % | DIASTOLIC BLOOD PRESSURE: 88 MMHG | HEART RATE: 80 BPM

## 2021-12-10 DIAGNOSIS — Z76.89 ENCOUNTER TO ESTABLISH CARE: Primary | ICD-10-CM

## 2021-12-10 DIAGNOSIS — R73.09 ELEVATED GLUCOSE: ICD-10-CM

## 2021-12-10 DIAGNOSIS — R20.2 PARESTHESIA OF BOTH FEET: ICD-10-CM

## 2021-12-10 DIAGNOSIS — E78.49 OTHER HYPERLIPIDEMIA: ICD-10-CM

## 2021-12-10 DIAGNOSIS — I10 PRIMARY HYPERTENSION: ICD-10-CM

## 2021-12-10 PROCEDURE — 99214 OFFICE O/P EST MOD 30 MIN: CPT | Performed by: NURSE PRACTITIONER

## 2021-12-10 RX ORDER — LANOLIN ALCOHOL/MO/W.PET/CERES
1000 CREAM (GRAM) TOPICAL DAILY
Qty: 90 TABLET | Refills: 3 | Status: SHIPPED | OUTPATIENT
Start: 2021-12-10

## 2021-12-10 NOTE — PROGRESS NOTES
Date of Encounter: 12/10/2021  Patient: Max Shah,  1934    Subjective     Chief complaint: Review labs  HPI   Patient here today to establish care.  Patient has no acute concerns today.  Patient would like to review the previous lab work his last provider laura.  Patient's last PCP is retiring.  Patient follows with a cardiologist on a regular basis.  No issues with current medications.  Complains of numbness in bilateral feet.    Review of Systems:  Negative for fever, congestion, chest pain upon exertion, shortness of breath, vision changes, vomiting, dysuria, lymphadenopathy, muscle weakness, numbness, mood changes, rashes.    The following portions of the patient's history were reviewed and updated as appropriate: allergies, current medications, past family history, past medical history, past social history, past surgical history and problem list.    Patient Active Problem List   Diagnosis   • Hypertension   • CAD (coronary artery disease)   • GERD (gastroesophageal reflux disease)   • Fatigue   • Hand pain, right   • Stenosis of carotid artery   • Diastolic dysfunction   • Hyperlipidemia   • Pulmonary hypertension (HCC)   • Obstructive sleep apnea   • Lightheadedness   • Paresthesia of both feet   • Elevated glucose   • Leg weakness, bilateral   • Balance disorder     Past Medical History:   Diagnosis Date   • Arthritis    • CAD (coronary artery disease)    • Cataracts, bilateral    • CPAP (continuous positive airway pressure) dependence    • Diverticulitis    • Fatigue    • GERD (gastroesophageal reflux disease)    • Hyperlipidemia    • Hypertension    • Knee pain    • PAOD (peripheral arterial occlusive disease) (HCC)    • Shortness of breath    • Sleep apnea    • Varicose veins      Past Surgical History:   Procedure Laterality Date   • ANGIOPLASTY      Cerebral   • CARDIAC CATHETERIZATION     • CAROTID ARTERY ANGIOPLASTY  2001   • CATARACT EXTRACTION W/ INTRAOCULAR LENS IMPLANT Left  "2017    Procedure: LT CATARACT PHACO EXTRACTION WITH INTRAOCULAR LENS IMPLANT;  Surgeon: Syed Tang MD;  Location[facility];  Service:    • CATARACT EXTRACTION W/ INTRAOCULAR LENS IMPLANT Right 2017    Procedure: RT CATARACT PHACO EXTRACTION WITH INTRAOCULAR LENS IMPLANT;  Surgeon: Syed Tang MD;  Location: [facility];  Service:    • THROMBOENDARTERECTOMY Right      Family History   Problem Relation Age of Onset   • Heart disease Other    • Cancer Other    • Breast cancer Mother          age 75   • Atrial fibrillation Father          age 82       Current Outpatient Medications:   •  aspirin 81 MG EC tablet, Take 81 mg by mouth daily., Disp: , Rfl:   •  diltiazem (TIAZAC) 120 MG 24 hr capsule, Take 120 mg by mouth., Disp: , Rfl:   •  losartan (COZAAR) 100 MG tablet, TAKE 1 TABLET BY MOUTH EVERY DAY, Disp: 90 tablet, Rfl: 3  •  Multiple Vitamin (MULTI-VITAMIN DAILY PO), Take 1 tablet by mouth Daily., Disp: , Rfl:   •  nitroglycerin (NITROSTAT) 0.4 MG SL tablet, PLACE 1 TABLET UNDER THE TONGUE EVERY 5 (FIVE) MINUTES AS NEEDED FOR CHEST PAIN., Disp: , Rfl: 3  •  simvastatin (ZOCOR) 20 MG tablet, TAKE 1 TABLET BY MOUTH EVERY DAY, Disp: 90 tablet, Rfl: 1  •  vitamin B-12 (CYANOCOBALAMIN) 1000 MCG tablet, Take 1 tablet by mouth Daily., Disp: 90 tablet, Rfl: 3  No Known Allergies  Social History     Tobacco Use   • Smoking status: Never Smoker   • Smokeless tobacco: Never Used   Substance Use Topics   • Alcohol use: Yes     Comment: moderate   • Drug use: No          Objective   Physical Exam   Vitals:    12/10/21 1350   BP: 140/88   Pulse: 80   Temp: 97.7 °F (36.5 °C)   TempSrc: Temporal   SpO2: 95%   Weight: 95 kg (209 lb 6.4 oz)   Height: 177.8 cm (70\")     Body mass index is 30.05 kg/m².    Constitutional: NAD.  Cardiovascular: RRR. No murmurs. No LE edema b/l. Radial pulses 2+ bilaterally.  Pulmonary: CTA b/l. Good effort.  Integumentary: No rashes or wounds on " face or upper extremities.  Psychiatric: Normal affect. Normal thought content.         Assessment/Plan   Assessment and Plan  Pleasant 87-year-old male with history of paresthesia of both feet, primary hypertension, hyperlipidemia, elevated glucose and others here today to establish care.    Diagnoses and all orders for this visit:    1. Encounter to establish care (Primary): Reviewed previous labs with patient in detail.  Answered some questions from the patient.  Reviewed medications with patient.  As well as previous medical history.    2. Paresthesia of both feet  Orders:  -     vitamin B-12 (CYANOCOBALAMIN) 1000 MCG tablet; Take 1 tablet by mouth Daily.  Dispense: 90 tablet; Refill: 3    3. Primary hypertension: Controlled.  Patient to continue current medications and low-salt diet.  Patient also follows with cardiologist, recommended to continue following on a regular basis.    4. Other hyperlipidemia: Previous labs were reviewed with patient.  Patient to follow low-fat diet and continue with simvastatin.    5. Elevated glucose: Reviewed A1c and discussed about prevention of diabetes.  Patient verbalized understanding.       Verbalized understanding of and agreed to plan of care.  Return in about 4 months (around 4/10/2022) for Recheck labs.     Karli Alonzo DNP, FNP-C  Family Medicine  O: [phone]      Please note that portions of this note were completed with a voice recognition program. Please call if questions.

## 2021-12-18 NOTE — TELEPHONE ENCOUNTER
Placed new referral. LVM for Pt    [Father] : father [Vitamin] : Primary Fluoride Source: Vitamin [Eats meals with family] : eats meals with family [Has family members/adults to turn to for help] : has family members/adults to turn to for help [Is permitted and is able to make independent decisions] : Is permitted and is able to make independent decisions [Grade: ____] : Grade: [unfilled] [Normal Performance] : normal performance [Normal Behavior/Attention] : normal behavior/attention [Normal Homework] : normal homework [Eats regular meals including adequate fruits and vegetables] : eats regular meals including adequate fruits and vegetables [Drinks non-sweetened liquids] : drinks non-sweetened liquids  [Calcium source] : calcium source [Has friends] : has friends [At least 1 hour of physical activity a day] : at least 1 hour of physical activity a day [Screen time (except homework) less than 2 hours a day] : screen time (except homework) less than 2 hours a day [Uses safety belts/safety equipment] : uses safety belts/safety equipment  [Impaired/distracted driving] : impaired/distracted driving [Has peer relationships free of violence] : has peer relationships free of violence [No] : Patient has not had sexual intercourse [Has ways to cope with stress] : has ways to cope with stress [Displays self-confidence] : displays self-confidence [Has problems with sleep] : has problems with sleep [Sleep Concerns] : no sleep concerns [Has concerns about body or appearance] : does not have concerns about body or appearance [Has interests/participates in community activities/volunteers] : does not have interests/participates in community activities/volunteers [Uses electronic nicotine delivery system] : does not use electronic nicotine delivery system [Exposure to electronic nicotine delivery system] : no exposure to electronic nicotine delivery system [Uses tobacco] : does not use tobacco [Exposure to tobacco] : no exposure to tobacco [Uses drugs] : does not use drugs  [Exposure to drugs] : no exposure to drugs [Drinks alcohol] : does not drink alcohol [Exposure to alcohol] : no exposure to alcohol [Gets depressed, anxious, or irritable/has mood swings] : does not get depressed, anxious, or irritable/has mood swings [Has thought about hurting self or considered suicide] : has not thought about hurting self or considered suicide [FreeTextEntry7] : well child [de-identified] : none

## 2022-02-20 DIAGNOSIS — E78.49 OTHER HYPERLIPIDEMIA: ICD-10-CM

## 2022-02-21 RX ORDER — SIMVASTATIN 20 MG
TABLET ORAL
Qty: 90 TABLET | Refills: 1 | OUTPATIENT
Start: 2022-02-21

## 2022-02-28 ENCOUNTER — OFFICE VISIT (OUTPATIENT)
Dept: INTERNAL MEDICINE | Facility: CLINIC | Age: 87
End: 2022-02-28

## 2022-02-28 VITALS
HEIGHT: 70 IN | HEART RATE: 78 BPM | OXYGEN SATURATION: 97 % | SYSTOLIC BLOOD PRESSURE: 123 MMHG | WEIGHT: 208.2 LBS | DIASTOLIC BLOOD PRESSURE: 68 MMHG | BODY MASS INDEX: 29.81 KG/M2 | TEMPERATURE: 97.5 F

## 2022-02-28 DIAGNOSIS — R29.898 LEG WEAKNESS, BILATERAL: Primary | ICD-10-CM

## 2022-02-28 DIAGNOSIS — R53.82 CHRONIC FATIGUE: ICD-10-CM

## 2022-02-28 PROCEDURE — 99213 OFFICE O/P EST LOW 20 MIN: CPT | Performed by: NURSE PRACTITIONER

## 2022-02-28 RX ORDER — PRAVASTATIN SODIUM 40 MG
TABLET ORAL
Qty: 90 TABLET | Refills: 3 | OUTPATIENT
Start: 2022-02-28

## 2022-02-28 NOTE — ASSESSMENT & PLAN NOTE
- Discussed about water therapy or seeing an orthopedic doctor.  -Patient is going to continue with physical therapy at this time.  States that he will discuss with them about water therapy or other options.  We will send in referral if requested by patient.

## 2022-02-28 NOTE — ASSESSMENT & PLAN NOTE
- Discussed with patient about staying active.  - Encourage patient to get some sunlight each day.  - Discussed about a balanced diet and regular exercise.  - Reviewed last labs with patient in detail.  We will continue to monitor.

## 2022-02-28 NOTE — PROGRESS NOTES
Date of Encounter: 2022  Patient: Max Shah,  1934    Subjective     Chief complaint: Leg pain and fatigue    HPI   Patient here today to discuss about ongoing issues with knee and leg.  Patient states that it is hard for him to get out of a chair that sits low.  States that his knees start to hurt.  Patient states that while he feels more steady on his feet he is not sure what else he can do to help with his knees.  Patient states he has been going to physical therapy 1 day a week.  States that physical therapy told him that there is not much more they can do for him.    Patient also wants to discuss about his sleeping.  Patient states he sleeps from 8 PM to 8 AM.  Patient states he stays busy around his house taking care of his wife.  States that his wife does not get off the couch so he does the cooking and cleaning.      Review of Systems:  Negative for fever, congestion, chest pain upon exertion, shortness of breath, vision changes, vomiting, dysuria, lymphadenopathy, muscle weakness, numbness, mood changes, rashes.  Positive for leg discomfort and fatigue.    The following portions of the patient's history were reviewed and updated as appropriate: allergies, current medications, past family history, past medical history, past social history, past surgical history and problem list.    Patient Active Problem List   Diagnosis   • Hypertension   • CAD (coronary artery disease)   • GERD (gastroesophageal reflux disease)   • Chronic fatigue   • Hand pain, right   • Stenosis of carotid artery   • Diastolic dysfunction   • Hyperlipidemia   • Pulmonary hypertension (HCC)   • Obstructive sleep apnea   • Lightheadedness   • Paresthesia of both feet   • Elevated glucose   • Leg weakness, bilateral   • Balance disorder     Past Medical History:   Diagnosis Date   • Arthritis    • CAD (coronary artery disease)    • Cataracts, bilateral    • CPAP (continuous positive airway pressure) dependence    •  Diverticulitis    • Fatigue    • GERD (gastroesophageal reflux disease)    • Hyperlipidemia    • Hypertension    • Knee pain    • PAOD (peripheral arterial occlusive disease) ([facility])    • Shortness of breath    • Sleep apnea    • Varicose veins      Past Surgical History:   Procedure Laterality Date   • ANGIOPLASTY      Cerebral   • CARDIAC CATHETERIZATION     • CAROTID ARTERY ANGIOPLASTY  2001   • CATARACT EXTRACTION W/ INTRAOCULAR LENS IMPLANT Left 2017    Procedure: LT CATARACT PHACO EXTRACTION WITH INTRAOCULAR LENS IMPLANT;  Surgeon: Syed Tang MD;  Location[place] [facility] [place] [facility];  Service:    • CATARACT EXTRACTION W/ INTRAOCULAR LENS IMPLANT Right 2017    Procedure: RT CATARACT PHACO EXTRACTION WITH INTRAOCULAR LENS IMPLANT;  Surgeon: Syed Tang MD;  Location[place] [facility] [place] [facility];  Service:    • THROMBOENDARTERECTOMY Right      Family History   Problem Relation Age of Onset   • Heart disease Other    • Cancer Other    • Breast cancer Mother          age 75   • Atrial fibrillation Father          age 82       Current Outpatient Medications:   •  aspirin 81 MG EC tablet, Take 81 mg by mouth daily., Disp: , Rfl:   •  diltiazem (TIAZAC) 120 MG 24 hr capsule, Take 120 mg by mouth., Disp: , Rfl:   •  losartan (COZAAR) 100 MG tablet, TAKE 1 TABLET BY MOUTH EVERY DAY, Disp: 90 tablet, Rfl: 3  •  Multiple Vitamin (MULTI-VITAMIN DAILY PO), Take 1 tablet by mouth Daily., Disp: , Rfl:   •  nitroglycerin (NITROSTAT) 0.4 MG SL tablet, PLACE 1 TABLET UNDER THE TONGUE EVERY 5 (FIVE) MINUTES AS NEEDED FOR CHEST PAIN., Disp: , Rfl: 3  •  simvastatin (ZOCOR) 20 MG tablet, TAKE 1 TABLET BY MOUTH EVERY DAY, Disp: 90 tablet, Rfl: 1  •  vitamin B-12 (CYANOCOBALAMIN) 1000 MCG tablet, Take 1 tablet by mouth Daily., Disp: 90 tablet, Rfl: 3  No Known Allergies  Social History     Tobacco Use   • Smoking status: Never Smoker   • Smokeless tobacco: Never Used   Substance Use Topics   • Alcohol  "use: Yes     Comment: moderate   • Drug use: No          Objective   Physical Exam   Vitals:    02/28/22 1405   BP: 123/68   Pulse: 78   Temp: 97.5 °F (36.4 °C)   TempSrc: Temporal   SpO2: 97%   Weight: 94.4 kg (208 lb 3.2 oz)   Height: 177.8 cm (70\")     Body mass index is 29.87 kg/m².    Constitutional: NAD.  Musculoskeletal: Patient has some swelling in bilateral knees.  No redness or warmth.  No pain with palpation.  Psychiatric: Normal affect. Normal thought content.           Assessment/Plan   Assessment and Plan  Pleasant 87-year-old male with history of chronic fatigue, sleep apnea, hypertension and others here today to discuss the following:    Diagnoses and all orders for this visit:    1. Leg weakness, bilateral (Primary)  Assessment & Plan:    - Discussed about water therapy or seeing an orthopedic doctor.  -Patient is going to continue with physical therapy at this time.  States that he will discuss with them about water therapy or other options.  We will send in referral if requested by patient.      2. Chronic fatigue  Assessment & Plan:  - Discussed with patient about staying active.  - Encourage patient to get some sunlight each day.  - Discussed about a balanced diet and regular exercise.  - Reviewed last labs with patient in detail.  We will continue to monitor.         Patient verbalized understanding of and agreed to plan of care.    Return if symptoms worsen or fail to improve.     Karli Alonzo DNP, FNP-C  Family Medicine  O: [phone]      Please note that portions of this note were completed with a voice recognition program. Please call if questions.     "

## 2022-03-02 ENCOUNTER — TELEPHONE (OUTPATIENT)
Dept: INTERNAL MEDICINE | Facility: CLINIC | Age: 87
End: 2022-03-02

## 2022-03-02 NOTE — TELEPHONE ENCOUNTER
Caller: Max Shah    Relationship: Self    Best call back number: [phone] (H)    PATIENT IS COMPLETLEY OUT OF HIS    losartan (COZAAR) 100 MG tablet       [facility] DOES NOT HAVE ANY IN STOCK AND DO NOT KNOW WHEN THEY WILL GET ANY BACK IN AND PATIENT NEEDS HELP WITH AN ALTERNATIVE.  PLEASE ADVISE      [facility]/pharmacy #[id number] - [place], [place] - [address] - [phone]  - [phone]   [phone]

## 2022-03-03 RX ORDER — LOSARTAN POTASSIUM 100 MG/1
100 TABLET ORAL DAILY
Qty: 90 TABLET | Refills: 3 | Status: SHIPPED | OUTPATIENT
Start: 2022-03-03 | End: 2022-08-11 | Stop reason: HOSPADM

## 2022-03-03 NOTE — TELEPHONE ENCOUNTER
Recommend we send to a different pharmacy that has it in stock as changing BP meds is not advised unless absolutely necessary

## 2022-03-07 ENCOUNTER — TELEPHONE (OUTPATIENT)
Dept: INTERNAL MEDICINE | Facility: CLINIC | Age: 87
End: 2022-03-07

## 2022-03-07 NOTE — TELEPHONE ENCOUNTER
PATIENT CALLED WANTING TO KNOW IF YOU COULD REFER HIM TO HAVE WATER THERAPY FOR HIS KNEES    PLEASE CALL / ADVISE    Max Shah (Self) [phone] (H)       HIS PHYSICAL THERAPIST IS:    ILIR JOHN THERAPIST  OR TAWNY SIMEON   [phone] IF YOU HAVE ANY QUESTIONS   FAX NUMBER [phone]

## 2022-03-08 DIAGNOSIS — R29.898 LEG WEAKNESS, BILATERAL: Primary | ICD-10-CM

## 2022-03-14 DIAGNOSIS — E78.49 OTHER HYPERLIPIDEMIA: ICD-10-CM

## 2022-03-14 RX ORDER — SIMVASTATIN 20 MG
20 TABLET ORAL DAILY
Qty: 90 TABLET | Refills: 1 | Status: SHIPPED | OUTPATIENT
Start: 2022-03-14 | End: 2022-10-21

## 2022-03-14 NOTE — TELEPHONE ENCOUNTER
.    Caller: Max Shah    Relationship: Self    Best call back number: [phone]     Requested Prescriptions:   Requested Prescriptions     Pending Prescriptions Disp Refills   • simvastatin (ZOCOR) 20 MG tablet 90 tablet 1     Sig: Take 1 tablet by mouth Daily.        Pharmacy where request should be sent: [facility]/PHARMACY #[id number] [address] [phone] [facility] [phone]      Additional details provided by patient: IT TOTALLY OUT    Does the patient have less than a 3 day supply:  [x] Yes  [] No    Skylar Santoyo Rep   03/14/22 12:49 EDT

## 2022-04-11 ENCOUNTER — TELEPHONE (OUTPATIENT)
Dept: INTERNAL MEDICINE | Facility: CLINIC | Age: 87
End: 2022-04-11

## 2022-04-11 DIAGNOSIS — R73.09 ELEVATED GLUCOSE: ICD-10-CM

## 2022-04-11 DIAGNOSIS — E53.8 B12 DEFICIENCY: ICD-10-CM

## 2022-04-11 DIAGNOSIS — I10 PRIMARY HYPERTENSION: Primary | ICD-10-CM

## 2022-04-11 DIAGNOSIS — E78.2 MIXED HYPERLIPIDEMIA: ICD-10-CM

## 2022-04-12 LAB
ALBUMIN SERPL-MCNC: 3.9 G/DL (ref 3.6–4.6)
ALBUMIN/GLOB SERPL: 1.3 {RATIO} (ref 1.2–2.2)
ALP SERPL-CCNC: 96 IU/L (ref 44–121)
ALT SERPL-CCNC: 12 IU/L (ref 0–44)
AST SERPL-CCNC: 17 IU/L (ref 0–40)
BASOPHILS # BLD AUTO: 0 X10E3/UL (ref 0–0.2)
BASOPHILS NFR BLD AUTO: 1 %
BILIRUB SERPL-MCNC: 0.5 MG/DL (ref 0–1.2)
BUN SERPL-MCNC: 19 MG/DL (ref 8–27)
BUN/CREAT SERPL: 22 (ref 10–24)
CALCIUM SERPL-MCNC: 9.2 MG/DL (ref 8.6–10.2)
CHLORIDE SERPL-SCNC: 105 MMOL/L (ref 96–106)
CHOLEST SERPL-MCNC: 128 MG/DL (ref 100–199)
CO2 SERPL-SCNC: 20 MMOL/L (ref 20–29)
CREAT SERPL-MCNC: 0.87 MG/DL (ref 0.76–1.27)
EGFRCR SERPLBLD CKD-EPI 2021: 83 ML/MIN/1.73
EOSINOPHIL # BLD AUTO: 0.3 X10E3/UL (ref 0–0.4)
EOSINOPHIL NFR BLD AUTO: 3 %
ERYTHROCYTE [DISTWIDTH] IN BLOOD BY AUTOMATED COUNT: 12.6 % (ref 11.6–15.4)
GLOBULIN SER CALC-MCNC: 2.9 G/DL (ref 1.5–4.5)
GLUCOSE SERPL-MCNC: 96 MG/DL (ref 65–99)
HBA1C MFR BLD: 5.7 % (ref 4.8–5.6)
HCT VFR BLD AUTO: 41.7 % (ref 37.5–51)
HDLC SERPL-MCNC: 52 MG/DL
HGB BLD-MCNC: 13.8 G/DL (ref 13–17.7)
IMM GRANULOCYTES # BLD AUTO: 0.1 X10E3/UL (ref 0–0.1)
IMM GRANULOCYTES NFR BLD AUTO: 1 %
LDLC SERPL CALC-MCNC: 65 MG/DL (ref 0–99)
LYMPHOCYTES # BLD AUTO: 1.7 X10E3/UL (ref 0.7–3.1)
LYMPHOCYTES NFR BLD AUTO: 21 %
MCH RBC QN AUTO: 30.3 PG (ref 26.6–33)
MCHC RBC AUTO-ENTMCNC: 33.1 G/DL (ref 31.5–35.7)
MCV RBC AUTO: 91 FL (ref 79–97)
MONOCYTES # BLD AUTO: 0.8 X10E3/UL (ref 0.1–0.9)
MONOCYTES NFR BLD AUTO: 10 %
NEUTROPHILS # BLD AUTO: 5.3 X10E3/UL (ref 1.4–7)
NEUTROPHILS NFR BLD AUTO: 64 %
PLATELET # BLD AUTO: 213 X10E3/UL (ref 150–450)
POTASSIUM SERPL-SCNC: 4.5 MMOL/L (ref 3.5–5.2)
PROT SERPL-MCNC: 6.8 G/DL (ref 6–8.5)
RBC # BLD AUTO: 4.56 X10E6/UL (ref 4.14–5.8)
SODIUM SERPL-SCNC: 141 MMOL/L (ref 134–144)
TRIGL SERPL-MCNC: 44 MG/DL (ref 0–149)
VIT B12 SERPL-MCNC: 929 PG/ML (ref 232–1245)
VLDLC SERPL CALC-MCNC: 11 MG/DL (ref 5–40)
WBC # BLD AUTO: 8.2 X10E3/UL (ref 3.4–10.8)

## 2022-08-06 ENCOUNTER — APPOINTMENT (OUTPATIENT)
Dept: CT IMAGING | Facility: HOSPITAL | Age: 87
End: 2022-08-06

## 2022-08-06 ENCOUNTER — HOSPITAL ENCOUNTER (INPATIENT)
Facility: HOSPITAL | Age: 87
LOS: 2 days | Discharge: NURSING, REHAB OR LONG-TERM CARE | End: 2022-08-11
Attending: EMERGENCY MEDICINE | Admitting: STUDENT IN AN ORGANIZED HEALTH CARE EDUCATION/TRAINING PROGRAM

## 2022-08-06 ENCOUNTER — APPOINTMENT (OUTPATIENT)
Dept: GENERAL RADIOLOGY | Facility: HOSPITAL | Age: 87
End: 2022-08-06

## 2022-08-06 DIAGNOSIS — M25.562 ACUTE PAIN OF LEFT KNEE: Primary | ICD-10-CM

## 2022-08-06 DIAGNOSIS — R26.2 UNABLE TO AMBULATE: ICD-10-CM
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
## 2022-08-06 PROBLEM — I45.10 RIGHT BUNDLE BRANCH BLOCK: Status: ACTIVE | Noted: 2022-08-06

## 2022-08-06 PROBLEM — S89.92XA LEFT KNEE INJURY: Status: ACTIVE | Noted: 2022-08-06

## 2022-08-06 LAB
ANION GAP SERPL CALCULATED.3IONS-SCNC: 9.3 MMOL/L (ref 5–15)
BASOPHILS # BLD AUTO: 0.03 10*3/MM3 (ref 0–0.2)
BASOPHILS NFR BLD AUTO: 0.3 % (ref 0–1.5)
BUN SERPL-MCNC: 20 MG/DL (ref 8–23)
BUN/CREAT SERPL: 18.2 (ref 7–25)
CALCIUM SPEC-SCNC: 9.2 MG/DL (ref 8.6–10.5)
CHLORIDE SERPL-SCNC: 103 MMOL/L (ref 98–107)
CO2 SERPL-SCNC: 26.7 MMOL/L (ref 22–29)
CREAT SERPL-MCNC: 1.1 MG/DL (ref 0.76–1.27)
DEPRECATED RDW RBC AUTO: 40 FL (ref 37–54)
EGFRCR SERPLBLD CKD-EPI 2021: 64.6 ML/MIN/1.73
EOSINOPHIL # BLD AUTO: 0.1 10*3/MM3 (ref 0–0.4)
EOSINOPHIL NFR BLD AUTO: 1.1 % (ref 0.3–6.2)
ERYTHROCYTE [DISTWIDTH] IN BLOOD BY AUTOMATED COUNT: 12.5 % (ref 12.3–15.4)
GLUCOSE SERPL-MCNC: 112 MG/DL (ref 65–99)
HCT VFR BLD AUTO: 38.5 % (ref 37.5–51)
HGB BLD-MCNC: 13.2 G/DL (ref 13–17.7)
IMM GRANULOCYTES # BLD AUTO: 0.05 10*3/MM3 (ref 0–0.05)
IMM GRANULOCYTES NFR BLD AUTO: 0.6 % (ref 0–0.5)
LYMPHOCYTES # BLD AUTO: 0.91 10*3/MM3 (ref 0.7–3.1)
LYMPHOCYTES NFR BLD AUTO: 10.1 % (ref 19.6–45.3)
MCH RBC QN AUTO: 30.3 PG (ref 26.6–33)
MCHC RBC AUTO-ENTMCNC: 34.3 G/DL (ref 31.5–35.7)
MCV RBC AUTO: 88.3 FL (ref 79–97)
MONOCYTES # BLD AUTO: 0.86 10*3/MM3 (ref 0.1–0.9)
MONOCYTES NFR BLD AUTO: 9.5 % (ref 5–12)
NEUTROPHILS NFR BLD AUTO: 7.07 10*3/MM3 (ref 1.7–7)
NEUTROPHILS NFR BLD AUTO: 78.4 % (ref 42.7–76)
NRBC BLD AUTO-RTO: 0 /100 WBC (ref 0–0.2)
PLATELET # BLD AUTO: 197 10*3/MM3 (ref 140–450)
PMV BLD AUTO: 8.8 FL (ref 6–12)
POTASSIUM SERPL-SCNC: 4.3 MMOL/L (ref 3.5–5.2)
RBC # BLD AUTO: 4.36 10*6/MM3 (ref 4.14–5.8)
SARS-COV-2 RNA PNL SPEC NAA+PROBE: NOT DETECTED
SODIUM SERPL-SCNC: 139 MMOL/L (ref 136–145)
WBC NRBC COR # BLD: 9.02 10*3/MM3 (ref 3.4–10.8)

## 2022-08-06 PROCEDURE — 25010000002 ENOXAPARIN PER 10 MG: Performed by: STUDENT IN AN ORGANIZED HEALTH CARE EDUCATION/TRAINING PROGRAM

## 2022-08-06 PROCEDURE — 73560 X-RAY EXAM OF KNEE 1 OR 2: CPT

## 2022-08-06 PROCEDURE — 99284 EMERGENCY DEPT VISIT MOD MDM: CPT

## 2022-08-06 PROCEDURE — 72125 CT NECK SPINE W/O DYE: CPT

## 2022-08-06 PROCEDURE — 87635 SARS-COV-2 COVID-19 AMP PRB: CPT | Performed by: EMERGENCY MEDICINE

## 2022-08-06 PROCEDURE — G0378 HOSPITAL OBSERVATION PER HR: HCPCS

## 2022-08-06 PROCEDURE — 70450 CT HEAD/BRAIN W/O DYE: CPT

## 2022-08-06 PROCEDURE — 80048 BASIC METABOLIC PNL TOTAL CA: CPT | Performed by: EMERGENCY MEDICINE

## 2022-08-06 PROCEDURE — 85025 COMPLETE CBC W/AUTO DIFF WBC: CPT | Performed by: EMERGENCY MEDICINE

## 2022-08-06 RX ORDER — ACETAMINOPHEN 325 MG/1
650 TABLET ORAL EVERY 4 HOURS PRN
Status: DISCONTINUED | OUTPATIENT
Start: 2022-08-06 | End: 2022-08-11 | Stop reason: HOSPADM

## 2022-08-06 RX ORDER — ATORVASTATIN CALCIUM 20 MG/1
10 TABLET, FILM COATED ORAL DAILY
Refills: 1 | Status: DISCONTINUED | OUTPATIENT
Start: 2022-08-06 | End: 2022-08-11 | Stop reason: HOSPADM

## 2022-08-06 RX ORDER — SODIUM CHLORIDE 0.9 % (FLUSH) 0.9 %
10 SYRINGE (ML) INJECTION AS NEEDED
Status: DISCONTINUED | OUTPATIENT
Start: 2022-08-06 | End: 2022-08-11 | Stop reason: HOSPADM

## 2022-08-06 RX ORDER — ONDANSETRON 4 MG/1
4 TABLET, FILM COATED ORAL EVERY 6 HOURS PRN
Status: DISCONTINUED | OUTPATIENT
Start: 2022-08-06 | End: 2022-08-11 | Stop reason: HOSPADM

## 2022-08-06 RX ORDER — UREA 10 %
3 LOTION (ML) TOPICAL NIGHTLY PRN
Status: DISCONTINUED | OUTPATIENT
Start: 2022-08-06 | End: 2022-08-11 | Stop reason: HOSPADM

## 2022-08-06 RX ORDER — HYDROCODONE BITARTRATE AND ACETAMINOPHEN 5; 325 MG/1; MG/1
1 TABLET ORAL EVERY 6 HOURS PRN
Status: DISCONTINUED | OUTPATIENT
Start: 2022-08-06 | End: 2022-08-11 | Stop reason: HOSPADM

## 2022-08-06 RX ORDER — AMOXICILLIN 250 MG
2 CAPSULE ORAL 2 TIMES DAILY
Status: DISCONTINUED | OUTPATIENT
Start: 2022-08-06 | End: 2022-08-11 | Stop reason: HOSPADM

## 2022-08-06 RX ORDER — ENOXAPARIN SODIUM 100 MG/ML
40 INJECTION SUBCUTANEOUS NIGHTLY
Status: DISCONTINUED | OUTPATIENT
Start: 2022-08-06 | End: 2022-08-11 | Stop reason: HOSPADM

## 2022-08-06 RX ORDER — CHOLECALCIFEROL (VITAMIN D3) 125 MCG
1000 CAPSULE ORAL DAILY
Status: DISCONTINUED | OUTPATIENT
Start: 2022-08-06 | End: 2022-08-11 | Stop reason: HOSPADM

## 2022-08-06 RX ORDER — ONDANSETRON 2 MG/ML
4 INJECTION INTRAMUSCULAR; INTRAVENOUS EVERY 6 HOURS PRN
Status: DISCONTINUED | OUTPATIENT
Start: 2022-08-06 | End: 2022-08-11 | Stop reason: HOSPADM

## 2022-08-06 RX ORDER — ASPIRIN 81 MG/1
81 TABLET ORAL DAILY
Status: DISCONTINUED | OUTPATIENT
Start: 2022-08-06 | End: 2022-08-11 | Stop reason: HOSPADM

## 2022-08-06 RX ADMIN — Medication 1000 MCG: at 21:11

## 2022-08-06 RX ADMIN — ASPIRIN 81 MG: 81 TABLET, FILM COATED ORAL at 18:24

## 2022-08-06 RX ADMIN — DOCUSATE SODIUM 50MG AND SENNOSIDES 8.6MG 2 TABLET: 8.6; 5 TABLET, FILM COATED ORAL at 21:11

## 2022-08-06 RX ADMIN — ENOXAPARIN SODIUM 40 MG: 100 INJECTION SUBCUTANEOUS at 21:22

## 2022-08-06 RX ADMIN — ATORVASTATIN CALCIUM 10 MG: 20 TABLET, FILM COATED ORAL at 18:24

## 2022-08-06 NOTE — H&P
Patient Name:  Max Shah  YOB: 1934  MRN:  [id number]  Admit Date:  8/6/2022  Patient Care Team:  Karli Alonzo APRN as PCP - General (Family Medicine)  Lulú Bettencourt MD as Consulting Physician (Cardiology)  Bear Boyd MD as Consulting Physician (Otolaryngology)  Skip Malik MD as Consulting Physician (Gastroenterology)  Clarke Bettencourt MD as Consulting Physician (Dermatology)  Audrey Elena MD as Consulting Physician (Vascular Surgery)      Subjective   History Present Illness     Chief Complaint   Patient presents with   • Fall   • Knee Pain       Mr. Shah is a 88 y.o. non-smoker with a history of hypertension, coronary artery disease, carotid artery stenosis s/p intervention in 2001, MARKO, chronic RBBB who presents to [facility] from home after a mechanical fall (tripped on his rug in his home) yesterday without loss of consciousness complaining of bilateral knee pain. He reports that both knees swelled immediately after his fall, but the right returned to normal quickly while the left remained swollen. He states that last night went okay and he managed to eat dinner and go to bed, but when he woke, I wasn't able to move the left knee and couldn't get out of bed or walk. He lives alone.    History of Present Illness  Review of Systems   Constitutional: Negative for chills, fatigue and fever.   HENT: Negative for postnasal drip, rhinorrhea, sinus pressure and sinus pain.    Eyes: Negative.    Respiratory: Negative for cough and shortness of breath.    Cardiovascular: Negative for chest pain and palpitations.   Gastrointestinal: Negative for diarrhea, nausea and vomiting.   Endocrine: Negative for polydipsia and polyuria.   Genitourinary: Negative for dysuria, frequency and urgency.   Musculoskeletal: Positive for gait problem and joint swelling. Negative for myalgias.   Skin: Negative for rash and wound.   Allergic/Immunologic: Negative.     Neurological: Negative for syncope and light-headedness.   Hematological: Negative.    Psychiatric/Behavioral: Negative for confusion and decreased concentration.        Personal History     Past Medical History:   Diagnosis Date   • Arthritis    • CAD (coronary artery disease)    • Cataracts, bilateral    • CPAP (continuous positive airway pressure) dependence    • Diverticulitis    • Fatigue    • GERD (gastroesophageal reflux disease)    • Hyperlipidemia    • Hypertension    • Knee pain    • PAOD (peripheral arterial occlusive disease) ([facility])    • Shortness of breath    • Sleep apnea    • Varicose veins      Past Surgical History:   Procedure Laterality Date   • ANGIOPLASTY      Cerebral   • CARDIAC CATHETERIZATION     • CAROTID ARTERY ANGIOPLASTY  2001   • CATARACT EXTRACTION W/ INTRAOCULAR LENS IMPLANT Left 2017    Procedure: LT CATARACT PHACO EXTRACTION WITH INTRAOCULAR LENS IMPLANT;  Surgeon: Syed Tang MD;  [place] [facility] [place] [facility] [place]  Service:    • CATARACT EXTRACTION W/ INTRAOCULAR LENS IMPLANT Right 2017    Procedure: RT CATARACT PHACO EXTRACTION WITH INTRAOCULAR LENS IMPLANT;  Surgeon: Syed Tang MD;  Location: [facility];  Service:    • THROMBOENDARTERECTOMY Right      Family History   Problem Relation Age of Onset   • Heart disease Other    • Cancer Other    • Breast cancer Mother          age 75   • Atrial fibrillation Father          age 82     Social History     Tobacco Use   • Smoking status: Never Smoker   • Smokeless tobacco: Never Used   Substance Use Topics   • Alcohol use: Yes     Comment: moderate   • Drug use: No     No current facility-administered medications on file prior to encounter.     Current Outpatient Medications on File Prior to Encounter   Medication Sig Dispense Refill   • aspirin 81 MG EC tablet Take 81 mg by mouth daily.     • diltiazem (TIAZAC) 120 MG 24 hr capsule Take 120 mg by mouth.     • losartan (COZAAR) 100 MG  tablet Take 1 tablet by mouth Daily. 90 tablet 3   • Multiple Vitamin (MULTI-VITAMIN DAILY PO) Take 1 tablet by mouth Daily.     • nitroglycerin (NITROSTAT) 0.4 MG SL tablet PLACE 1 TABLET UNDER THE TONGUE EVERY 5 (FIVE) MINUTES AS NEEDED FOR CHEST PAIN.  3   • simvastatin (ZOCOR) 20 MG tablet Take 1 tablet by mouth Daily. 90 tablet 1   • vitamin B-12 (CYANOCOBALAMIN) 1000 MCG tablet Take 1 tablet by mouth Daily. 90 tablet 3     No Known Allergies    Objective    Objective     Vital Signs  Temp:  [98.4 °F (36.9 °C)] 98.4 °F (36.9 °C)  Heart Rate:  [82] 82  Resp:  [18] 18  BP: (150)/(89) 150/89  SpO2:  [98 %] 98 %  on   ;   Device (Oxygen Therapy): room air  Body mass index is 25.1 kg/m².    Physical Exam  Vitals and nursing note reviewed.   Constitutional:       General: He is not in acute distress.     Appearance: Normal appearance. He is not toxic-appearing.   HENT:      Head: Normocephalic.      Comments: Left forehead with quarter sized bump     Mouth/Throat:      Mouth: Mucous membranes are moist.      Pharynx: Oropharynx is clear. No oropharyngeal exudate.   Eyes:      Extraocular Movements: Extraocular movements intact.      Conjunctiva/sclera: Conjunctivae normal.      Pupils: Pupils are equal, round, and reactive to light.   Cardiovascular:      Rate and Rhythm: Normal rate and regular rhythm.      Heart sounds: Normal heart sounds.   Pulmonary:      Effort: Pulmonary effort is normal. No respiratory distress.      Breath sounds: Normal breath sounds. No wheezing, rhonchi or rales.   Abdominal:      General: Bowel sounds are normal. There is no distension.      Palpations: Abdomen is soft.      Tenderness: There is no abdominal tenderness.   Musculoskeletal:         General: Swelling and tenderness present.      Cervical back: Normal range of motion and neck supple.      Comments: Left knee, unable to left off bed, cannot extend   Skin:     General: Skin is dry.      Findings: No erythema or rash.    Neurological:      General: No focal deficit present.      Mental Status: He is alert and oriented to person, place, and time.   Psychiatric:         Mood and Affect: Mood normal.         Behavior: Behavior normal.         Results Review:  I reviewed the patient's new clinical results.  I reviewed the patient's new imaging results and agree with the interpretation.  I reviewed the patient's other test results and agree with the interpretation  I personally viewed and interpreted the patient's EKG/Telemetry data  Discussed with ED provider.    Lab Results (last 24 hours)     Procedure Component Value Units Date/Time    CBC & Differential [[id number]]  (Abnormal) Collected: 08/06/22 1111    Specimen: Blood Updated: 08/06/22 1120    Narrative:      The following orders were created for panel order CBC & Differential.  Procedure                               Abnormality         Status                     ---------                               -----------         ------                     CBC Auto Differential[[id number]]        Abnormal            Final result                 Please view results for these tests on the individual orders.    Basic Metabolic Panel [[id number]]  (Abnormal) Collected: 08/06/22 1111    Specimen: Blood Updated: 08/06/22 1139     Glucose 112 mg/dL      BUN 20 mg/dL      Creatinine 1.10 mg/dL      Sodium 139 mmol/L      Potassium 4.3 mmol/L      Chloride 103 mmol/L      CO2 26.7 mmol/L      Calcium 9.2 mg/dL      BUN/Creatinine Ratio 18.2     Anion Gap 9.3 mmol/L      eGFR 64.6 mL/min/1.73      Comment: National Kidney Foundation and American Society of Nephrology (ASN) Task Force recommended calculation based on the Chronic Kidney Disease Epidemiology Collaboration (CKD-EPI) equation refit without adjustment for race.       Narrative:      GFR Normal >60  Chronic Kidney Disease <60  Kidney Failure <15      CBC Auto Differential [[id number]]  (Abnormal) Collected: 08/06/22 1111    Specimen:  Blood Updated: 08/06/22 1120     WBC 9.02 10*3/mm3      RBC 4.36 10*6/mm3      Hemoglobin 13.2 g/dL      Hematocrit 38.5 %      MCV 88.3 fL      MCH 30.3 pg      MCHC 34.3 g/dL      RDW 12.5 %      RDW-SD 40.0 fl      MPV 8.8 fL      Platelets 197 10*3/mm3      Neutrophil % 78.4 %      Lymphocyte % 10.1 %      Monocyte % 9.5 %      Eosinophil % 1.1 %      Basophil % 0.3 %      Immature Grans % 0.6 %      Neutrophils, Absolute 7.07 10*3/mm3      Lymphocytes, Absolute 0.91 10*3/mm3      Monocytes, Absolute 0.86 10*3/mm3      Eosinophils, Absolute 0.10 10*3/mm3      Basophils, Absolute 0.03 10*3/mm3      Immature Grans, Absolute 0.05 10*3/mm3      nRBC 0.0 /100 WBC     COVID PRE-OP / PRE-PROCEDURE SCREENING ORDER (NO ISOLATION) - Swab, Nasopharynx [[id number]] Collected: 08/06/22 1225    Specimen: Swab from Nasopharynx Updated: 08/06/22 1230    Narrative:      The following orders were created for panel order COVID PRE-OP / PRE-PROCEDURE SCREENING ORDER (NO ISOLATION) - Swab, Nasopharynx.  Procedure                               Abnormality         Status                     ---------                               -----------         ------                     COVID-19[facility] IN-HOUSE...[[id number]]                      In process                   Please view results for these tests on the individual orders.    COVID-19[facility] IN-HOUSE CEPHEID/CHANDNI NP SWAB IN TRANSPORT MEDIA 8-12 HR TAT - Swab, Nasopharynx [[id number]] Collected: 08/06/22 1225    Specimen: Swab from Nasopharynx Updated: 08/06/22 1230          Imaging Results (Last 24 Hours)     Procedure Component Value Units Date/Time    CT Head Without Contrast [[id number]] Collected: 08/06/22 1111     Updated: 08/06/22 1122    Narrative:      CT HEAD AND CERVICAL SPINE WITHOUT CONTRAST     HISTORY: Fall, head and neck injury     TECHNIQUE: Radiation dose reduction techniques were utilized, including  automated exposure control and exposure modulation based on body  size.  CT scan of the head and cervical spine was performed without IV  contrast.      COMPARISON: None available     FINDINGS:  CT HEAD:  There is no evidence of intracranial hemorrhage, hydrocephalus, focal  mass lesion or acute cortical-based infarction. Prominence of the  ventricles and CSF spaces consistent with age-appropriate volume loss.  Previous cataract surgeries. Opacification of a few ethmoid air cells.  The mastoids are clear. Carotid siphon calcifications. Calvarium intact.     CT CERVICAL SPINE:  There is no evidence of acute fracture or traumatic subluxation. There  is no prevertebral soft tissue swelling. There is rightward curvature of  the cervical spine. Extensive facet hypertrophy is demonstrated on the  left with fusion of multiple facet joints. Multilevel degenerative disc  space narrowing, greatest in the lower cervical spine with areas of  vacuum disc phenomenon. Some mild central canal narrowing is seen in the  lower cervical spine. Carotid calcifications are present. Lung apices  are clear.       Impression:      1. No acute findings in the head or cervical spine  2. Chronic changes as described above     Radiation dose reduction techniques were utilized, including automated  exposure control and exposure modulation based on body size.     This report was finalized on 8/6/2022 11:19 AM by Dr. Leonid Bettencourt M.D.       CT Cervical Spine Without Contrast [[id number]] Collected: 08/06/22 1111     Updated: 08/06/22 1122    Narrative:      CT HEAD AND CERVICAL SPINE WITHOUT CONTRAST     HISTORY: Fall, head and neck injury     TECHNIQUE: Radiation dose reduction techniques were utilized, including  automated exposure control and exposure modulation based on body size.  CT scan of the head and cervical spine was performed without IV  contrast.      COMPARISON: None available     FINDINGS:  CT HEAD:  There is no evidence of intracranial hemorrhage, hydrocephalus, focal  mass lesion or acute  cortical-based infarction. Prominence of the  ventricles and CSF spaces consistent with age-appropriate volume loss.  Previous cataract surgeries. Opacification of a few ethmoid air cells.  The mastoids are clear. Carotid siphon calcifications. Calvarium intact.     CT CERVICAL SPINE:  There is no evidence of acute fracture or traumatic subluxation. There  is no prevertebral soft tissue swelling. There is rightward curvature of  the cervical spine. Extensive facet hypertrophy is demonstrated on the  left with fusion of multiple facet joints. Multilevel degenerative disc  space narrowing, greatest in the lower cervical spine with areas of  vacuum disc phenomenon. Some mild central canal narrowing is seen in the  lower cervical spine. Carotid calcifications are present. Lung apices  are clear.       Impression:      1. No acute findings in the head or cervical spine  2. Chronic changes as described above     Radiation dose reduction techniques were utilized, including automated  exposure control and exposure modulation based on body size.     This report was finalized on 8/6/2022 11:19 AM by Dr. Leonid Bettencourt M.D.       XR Knee 1 or 2 View Bilateral [[id number]] Collected: 08/06/22 1107     Updated: 08/06/22 1111    Narrative:      BILATERAL KNEES, 2 VIEWS EACH     HISTORY: Bilateral knee pain     COMPARISON: None available     FINDINGS:  Right knee:  Tricompartmental degenerative changes, greatest in the patellofemoral  compartment. No evidence of fracture or joint effusion.     Left knee:  Tricompartmental degenerative changes, greatest in the patellofemoral  compartment. No evidence of fracture or joint effusion.       Impression:      Bilateral degenerative changes     This report was finalized on 8/6/2022 11:08 AM by Dr. Leonid Bettencourt M.D.                 No orders to display        Assessment/Plan     Active Hospital Problems    Diagnosis  POA   • **Left knee injury [S89.92XA]  Yes   • Fall [W19.XXXA]  Yes    • Right bundle branch block [I45.10]  Yes   • Obstructive sleep apnea [G47.33]  Yes   • Stenosis of carotid artery [I65.29]  Yes   • Hypertension [I10]  Yes   • CAD (coronary artery disease) [I25.10]  Yes      Resolved Hospital Problems   No resolved problems to display.       Mr. Shah is a 88 y.o. nonsmoker with a history of hypertension, coronary artery disease, carotid artery stenosis s/p intervention in 2001, MARKO, chronic RBBB who presents after a mechanical fall at home with left knee swelling and inability to extend said knee or walk.    · Left knee injury/swelling-check MRI L knee. Pain control. Bowel regimen. Ask orthopedic surgery to see.  · Hypertension-apparently not taking any BP medications at home anymore. bp is high here, but this could be related to pain.  · Coronary artery disease-aspirin/statin. Normal stress test last month.  · Carotid artery stenosis s/p intervention in 2001. 60-70% bilateral stenosis on most recently ultrasound  · MARKO-continue cpap if available  · Chronic RBBB  · I discussed the patient's findings and my recommendations with patient and ED provider.    VTE Prophylaxis - Pharmacy to dose Lovenox.  Code Status - Full code.       Beto Alvarado MD  [facility]  08/06/22  12:33 EDT

## 2022-08-06 NOTE — ED PROVIDER NOTES
EMERGENCY DEPARTMENT ENCOUNTER    Room Number:  11/11  Date of encounter:  8/6/2022  PCP: Karli Alonzo APRN  Historian: Patient      HPI:  Chief Complaint: Fall with knee pain  A complete HPI/ROS/PMH/PSH/SH/FH are unobtainable due to: None    Context: Max Shah is a 88 y.o. male who presents to the ED c/o fall with knee pain.  This occurred around 330 this morning.  States that he tripped on a rug in his home.  He lives by himself.  His wife lives in a facility.  He states that he fell forward and hit the left forehead.  He is on aspirin only.  Complains primarily of having knee pain that affects both knees but it is worse than the left.  He is unable to raise his left leg.  Pain is mild to moderate.  He declines any pain medication.  Pain is worse with movement.      PAST MEDICAL HISTORY  Active Ambulatory Problems     Diagnosis Date Noted   • Hypertension 07/28/2016   • CAD (coronary artery disease) 07/28/2016   • GERD (gastroesophageal reflux disease) 07/28/2016   • Chronic fatigue 07/28/2016   • Hand pain, right 07/28/2016   • Stenosis of carotid artery 09/06/2016   • Diastolic dysfunction 09/06/2016   • Hyperlipidemia 09/06/2016   • Pulmonary hypertension (HCC) 09/06/2016   • Obstructive sleep apnea 02/01/2018   • Lightheadedness 08/12/2019   • Paresthesia of both feet 08/12/2019   • Elevated glucose 08/12/2019   • Leg weakness, bilateral 08/31/2020   • Balance disorder 10/26/2021     Resolved Ambulatory Problems     Diagnosis Date Noted   • No Resolved Ambulatory Problems     Past Medical History:   Diagnosis Date   • Arthritis    • Cataracts, bilateral    • CPAP (continuous positive airway pressure) dependence    • Diverticulitis    • Fatigue    • Knee pain    • PAOD (peripheral arterial occlusive disease) ([facility])    • Shortness of breath    • Sleep apnea    • Varicose veins          PAST SURGICAL HISTORY  Past Surgical History:   Procedure Laterality Date   • ANGIOPLASTY  2002    Cerebral   • CARDIAC  CATHETERIZATION     • CAROTID ARTERY ANGIOPLASTY  2001   • CATARACT EXTRACTION W/ INTRAOCULAR LENS IMPLANT Left 2017    Procedure: LT CATARACT PHACO EXTRACTION WITH INTRAOCULAR LENS IMPLANT;  Surgeon: Syed Tang MD;  Location: [facility];  Service:    • CATARACT EXTRACTION W/ INTRAOCULAR LENS IMPLANT Right 2017    Procedure: RT CATARACT PHACO EXTRACTION WITH INTRAOCULAR LENS IMPLANT;  Surgeon: Syed Tang MD;  Location: [facility];  Service:    • THROMBOENDARTERECTOMY Right          FAMILY HISTORY  Family History   Problem Relation Age of Onset   • Heart disease Other    • Cancer Other    • Breast cancer Mother          age 75   • Atrial fibrillation Father          age 82         SOCIAL HISTORY  Social History     Socioeconomic History   • Marital status:    Tobacco Use   • Smoking status: Never Smoker   • Smokeless tobacco: Never Used   Substance and Sexual Activity   • Alcohol use: Yes     Comment: moderate   • Drug use: No   • Sexual activity: Never         ALLERGIES  Patient has no known allergies.        REVIEW OF SYSTEMS  Review of Systems     All systems reviewed and negative except for those discussed in HPI.       PHYSICAL EXAM    I have reviewed the triage vital signs and nursing notes.    ED Triage Vitals [22 1018]   Temp Heart Rate Resp BP SpO2   98.4 °F (36.9 °C) 82 18 150/89 98 %      Temp src Heart Rate Source Patient Position BP Location FiO2 (%)   Temporal -- -- -- --       Physical Exam  GENERAL: not distressed  HENT: nares patent  EYES: no scleral icterus  CV: regular rhythm, regular rate, 2+ DP pulses bilaterally  RESPIRATORY: normal effort, clear to auscultation bilaterally  ABDOMEN: soft, nontender  MUSCULOSKELETAL: No C/T/L-spine tenderness, no pain about Patient of his chest wall or upper extremities.  He has left knee effusion on exam with superior patellar fullness, he is unable to extend his left knee, mild pain to  palpation to the anterior right knee.  Otherwise no pain to palpation of his lower extremities.  NEURO: alert, moves all extremities, follows commands  SKIN: warm, dry, abrasion and swelling to the left forehead, bruising to the anterior right knee        LAB RESULTS  Recent Results (from the past 24 hour(s))   Basic Metabolic Panel    Collection Time: 08/06/22 11:11 AM    Specimen: Blood   Result Value Ref Range    Glucose 112 (H) 65 - 99 mg/dL    BUN 20 8 - 23 mg/dL    Creatinine 1.10 0.76 - 1.27 mg/dL    Sodium 139 136 - 145 mmol/L    Potassium 4.3 3.5 - 5.2 mmol/L    Chloride 103 98 - 107 mmol/L    CO2 26.7 22.0 - 29.0 mmol/L    Calcium 9.2 8.6 - 10.5 mg/dL    BUN/Creatinine Ratio 18.2 7.0 - 25.0    Anion Gap 9.3 5.0 - 15.0 mmol/L    eGFR 64.6 >60.0 mL/min/1.73   CBC Auto Differential    Collection Time: 08/06/22 11:11 AM    Specimen: Blood   Result Value Ref Range    WBC 9.02 3.40 - 10.80 10*3/mm3    RBC 4.36 4.14 - 5.80 10*6/mm3    Hemoglobin 13.2 13.0 - 17.7 g/dL    Hematocrit 38.5 37.5 - 51.0 %    MCV 88.3 79.0 - 97.0 fL    MCH 30.3 26.6 - 33.0 pg    MCHC 34.3 31.5 - 35.7 g/dL    RDW 12.5 12.3 - 15.4 %    RDW-SD 40.0 37.0 - 54.0 fl    MPV 8.8 6.0 - 12.0 fL    Platelets 197 140 - 450 10*3/mm3    Neutrophil % 78.4 (H) 42.7 - 76.0 %    Lymphocyte % 10.1 (L) 19.6 - 45.3 %    Monocyte % 9.5 5.0 - 12.0 %    Eosinophil % 1.1 0.3 - 6.2 %    Basophil % 0.3 0.0 - 1.5 %    Immature Grans % 0.6 (H) 0.0 - 0.5 %    Neutrophils, Absolute 7.07 (H) 1.70 - 7.00 10*3/mm3    Lymphocytes, Absolute 0.91 0.70 - 3.10 10*3/mm3    Monocytes, Absolute 0.86 0.10 - 0.90 10*3/mm3    Eosinophils, Absolute 0.10 0.00 - 0.40 10*3/mm3    Basophils, Absolute 0.03 0.00 - 0.20 10*3/mm3    Immature Grans, Absolute 0.05 0.00 - 0.05 10*3/mm3    nRBC 0.0 0.0 - 0.2 /100 WBC       Ordered the above labs and independently reviewed the results.        RADIOLOGY  CT Head Without Contrast, CT Cervical Spine Without Contrast    Result Date: 8/6/2022  CT  HEAD AND CERVICAL SPINE WITHOUT CONTRAST  HISTORY: Fall, head and neck injury  TECHNIQUE: Radiation dose reduction techniques were utilized, including automated exposure control and exposure modulation based on body size. CT scan of the head and cervical spine was performed without IV contrast.  COMPARISON: None available  FINDINGS: CT HEAD: There is no evidence of intracranial hemorrhage, hydrocephalus, focal mass lesion or acute cortical-based infarction. Prominence of the ventricles and CSF spaces consistent with age-appropriate volume loss. Previous cataract surgeries. Opacification of a few ethmoid air cells. The mastoids are clear. Carotid siphon calcifications. Calvarium intact.  CT CERVICAL SPINE: There is no evidence of acute fracture or traumatic subluxation. There is no prevertebral soft tissue swelling. There is rightward curvature of the cervical spine. Extensive facet hypertrophy is demonstrated on the left with fusion of multiple facet joints. Multilevel degenerative disc space narrowing, greatest in the lower cervical spine with areas of vacuum disc phenomenon. Some mild central canal narrowing is seen in the lower cervical spine. Carotid calcifications are present. Lung apices are clear.      1. No acute findings in the head or cervical spine 2. Chronic changes as described above  Radiation dose reduction techniques were utilized, including automated exposure control and exposure modulation based on body size.  This report was finalized on 8/6/2022 11:19 AM by Dr. Leonid Bettencourt M.D.      XR Knee 1 or 2 View Bilateral    Result Date: 8/6/2022  BILATERAL KNEES, 2 VIEWS EACH  HISTORY: Bilateral knee pain  COMPARISON: None available  FINDINGS: Right knee: Tricompartmental degenerative changes, greatest in the patellofemoral compartment. No evidence of fracture or joint effusion.  Left knee: Tricompartmental degenerative changes, greatest in the patellofemoral compartment. No evidence of fracture or  joint effusion.      Bilateral degenerative changes  This report was finalized on 8/6/2022 11:08 AM by Dr. Leonid Bettencourt M.D.        I ordered the above noted radiological studies. Reviewed by me and discussed with radiologist.  See dictation for official radiology interpretation.      PROCEDURES    Procedures      MEDICATIONS GIVEN IN ER    Medications   sodium chloride 0.9 % flush 10 mL (has no administration in time range)         PROGRESS, DATA ANALYSIS, CONSULTS, AND MEDICAL DECISION MAKING    All labs have been independently reviewed by me.  All radiology studies have been reviewed by me and discussed with radiologist dictating the report.   EKG's independently viewed and interpreted by me.  Discussion below represents my analysis of pertinent findings related to patient's condition, differential diagnosis, treatment plan and final disposition.    I am suspicious about a quadriceps tendon injury given his exam.  X-rays are acutely negative for fracture or dislocation.  Unfortunately, he is unable to ambulate.  He is 88 years old and lives alone.  He will need admission for further diagnostic work-up and evaluation.  He continues to decline pain medication.    ED Course as of 08/06/22 1230   Sat Aug 06, 2022   1147 Creatinine: 1.10 [TD]   1147 Sodium: 139 [TD]   1147 WBC: 9.02 [TD]   1147 Hemoglobin: 13.2 [TD]   1228 I discussed the case Dr. Alvarado, hospitalist for [facility].  We reviewed patient's labs, history, imaging.  He will admit. [TD]      ED Course User Index  [TD] Angelito Wiley II, MD           PPE: The patient wore a surgical mask throughout the entire patient encounter. I wore an N95.    AS OF 12:30 EDT VITALS:    BP - 150/89  HR - 82  TEMP - 98.4 °F (36.9 °C) (Temporal)  O2 SATS - 98%        DIAGNOSIS  Final diagnoses:   Acute pain of left knee   Fall, initial encounter   Unable to ambulate         DISPOSITION  Admit           Angelito Wiley II, MD  08/06/22 1230

## 2022-08-06 NOTE — PLAN OF CARE
Goal Outcome Evaluation:      Pt A&O x 4, Left leg pain but refused pain meds, slide from stretcher, pillow support for left knee, reg diet, room air, waiting for MRI of Left knee (screening complete), VSS

## 2022-08-06 NOTE — PROGRESS NOTES
"[facility] Clinical Pharmacy Services: Enoxaparin Consult    Max MULLIGAN Vijayadomitila has a pharmacy consult to dose prophylactic enoxaparin per Dr. Alvarado's request.     Indication: VTE Prophylaxis  Home Anticoagulation: none     Relevant clinical data and objective history reviewed:  88 y.o. male 180.3 cm (71\") 81.6 kg (180 lb)   Body mass index is 25.1 kg/m².   Results from last 7 days   Lab Units 08/06/22  1111   CREATININE mg/dL 1.10   PLATELETS 10*3/mm3 197   HEMOGLOBIN g/dL 13.2     Estimated Creatinine Clearance: 53.6 mL/min (by C-G formula based on SCr of 1.1 mg/dL).    Assessment/Plan    Will start patient on 40mg subcutaneous every 24 hours, dosed for normal wt and Crcl>30ml/min. Consult order will be discontinued but pharmacy will continue to follow.     Carolann Contrears, PharmD  Clinical Pharmacist    "

## 2022-08-06 NOTE — ED TRIAGE NOTES
Pt arrived via ems from home with complaints of a fall yesterday. Pt reports his legs became weak and he fell onto his knees. PT denies LOC, denies blood thinners. PT reports bilateral knee pain.     Pt was wearing a mask during assessment.  This RN wore appropriate PPE

## 2022-08-07 ENCOUNTER — APPOINTMENT (OUTPATIENT)
Dept: MRI IMAGING | Facility: HOSPITAL | Age: 87
End: 2022-08-07

## 2022-08-07 LAB
ANION GAP SERPL CALCULATED.3IONS-SCNC: 11 MMOL/L (ref 5–15)
BUN SERPL-MCNC: 16 MG/DL (ref 8–23)
BUN/CREAT SERPL: 19 (ref 7–25)
CALCIUM SPEC-SCNC: 9.1 MG/DL (ref 8.6–10.5)
CHLORIDE SERPL-SCNC: 101 MMOL/L (ref 98–107)
CO2 SERPL-SCNC: 22 MMOL/L (ref 22–29)
CREAT SERPL-MCNC: 0.84 MG/DL (ref 0.76–1.27)
DEPRECATED RDW RBC AUTO: 40.7 FL (ref 37–54)
EGFRCR SERPLBLD CKD-EPI 2021: 83.9 ML/MIN/1.73
ERYTHROCYTE [DISTWIDTH] IN BLOOD BY AUTOMATED COUNT: 12.8 % (ref 12.3–15.4)
GLUCOSE SERPL-MCNC: 123 MG/DL (ref 65–99)
HCT VFR BLD AUTO: 42.7 % (ref 37.5–51)
HGB BLD-MCNC: 14.1 G/DL (ref 13–17.7)
MCH RBC QN AUTO: 29.1 PG (ref 26.6–33)
MCHC RBC AUTO-ENTMCNC: 33 G/DL (ref 31.5–35.7)
MCV RBC AUTO: 88.2 FL (ref 79–97)
PLATELET # BLD AUTO: 197 10*3/MM3 (ref 140–450)
PMV BLD AUTO: 9.2 FL (ref 6–12)
POTASSIUM SERPL-SCNC: 4.2 MMOL/L (ref 3.5–5.2)
RBC # BLD AUTO: 4.84 10*6/MM3 (ref 4.14–5.8)
SODIUM SERPL-SCNC: 134 MMOL/L (ref 136–145)
WBC NRBC COR # BLD: 11.27 10*3/MM3 (ref 3.4–10.8)

## 2022-08-07 PROCEDURE — 73721 MRI JNT OF LWR EXTRE W/O DYE: CPT

## 2022-08-07 PROCEDURE — 36415 COLL VENOUS BLD VENIPUNCTURE: CPT | Performed by: STUDENT IN AN ORGANIZED HEALTH CARE EDUCATION/TRAINING PROGRAM

## 2022-08-07 PROCEDURE — G0378 HOSPITAL OBSERVATION PER HR: HCPCS

## 2022-08-07 PROCEDURE — 25010000002 ENOXAPARIN PER 10 MG: Performed by: STUDENT IN AN ORGANIZED HEALTH CARE EDUCATION/TRAINING PROGRAM

## 2022-08-07 PROCEDURE — 85027 COMPLETE CBC AUTOMATED: CPT | Performed by: STUDENT IN AN ORGANIZED HEALTH CARE EDUCATION/TRAINING PROGRAM

## 2022-08-07 PROCEDURE — 80048 BASIC METABOLIC PNL TOTAL CA: CPT | Performed by: STUDENT IN AN ORGANIZED HEALTH CARE EDUCATION/TRAINING PROGRAM

## 2022-08-07 PROCEDURE — 99203 OFFICE O/P NEW LOW 30 MIN: CPT | Performed by: ORTHOPAEDIC SURGERY

## 2022-08-07 RX ADMIN — Medication 1000 MCG: at 10:48

## 2022-08-07 RX ADMIN — DOCUSATE SODIUM 50MG AND SENNOSIDES 8.6MG 2 TABLET: 8.6; 5 TABLET, FILM COATED ORAL at 10:49

## 2022-08-07 RX ADMIN — ENOXAPARIN SODIUM 40 MG: 100 INJECTION SUBCUTANEOUS at 20:57

## 2022-08-07 RX ADMIN — Medication 3 MG: at 20:57

## 2022-08-07 RX ADMIN — DOCUSATE SODIUM 50MG AND SENNOSIDES 8.6MG 2 TABLET: 8.6; 5 TABLET, FILM COATED ORAL at 20:57

## 2022-08-07 RX ADMIN — ACETAMINOPHEN 650 MG: 325 TABLET, FILM COATED ORAL at 21:16

## 2022-08-07 RX ADMIN — MAGNESIUM HYDROXIDE 10 ML: 2400 SUSPENSION ORAL at 15:24

## 2022-08-07 RX ADMIN — Medication 10 ML: at 20:56

## 2022-08-07 RX ADMIN — ATORVASTATIN CALCIUM 10 MG: 20 TABLET, FILM COATED ORAL at 10:49

## 2022-08-07 RX ADMIN — ASPIRIN 81 MG: 81 TABLET, FILM COATED ORAL at 10:49

## 2022-08-07 NOTE — CONSULTS
Orthopedic Consult      Patient: Max Shah    Date of Admission: 8/6/2022 10:19 AM    YOB: 1934    Medical Record Number: [id number]    Consulting Physician: Beto Alvarado MD    Chief Complaints: Bilateral knee injuries    History of Present Illness: 88 y.o. male admitted to [facility] to services of Beto Alvarado MD with bilateral knee injuries.  He fell yesterday landing on the front of both knees.  He bruised the right knee but he says the right knee is already feeling better.  His left knee pain is severe and he reports that he is unable to stand or bear weight.  He says that he really cannot move that knee without severe pain.  He tells me that he did have some discomfort in his knees even prior to the injury but he was managing just fine and had never had to seek any treatment for the knees.  Denies any other injuries or complaints.    Allergies: No Known Allergies    Home Medications:    Current Facility-Administered Medications:   •  acetaminophen (TYLENOL) tablet 650 mg, 650 mg, Oral, Q4H PRN, Beto Alvarado MD  •  aspirin EC tablet 81 mg, 81 mg, Oral, Daily, Beto Alvarado MD, 81 mg at 08/06/22 1824  •  atorvastatin (LIPITOR) tablet 10 mg, 10 mg, Oral, Daily, Beto Alvarado MD, 10 mg at 08/06/22 1824  •  Enoxaparin Sodium (LOVENOX) syringe 40 mg, 40 mg, Subcutaneous, Nightly, Beto Alvarado MD, 40 mg at 08/06/22 2122  •  HYDROcodone-acetaminophen (NORCO) 5-325 MG per tablet 1 tablet, 1 tablet, Oral, Q6H PRN, Beto Alvarado MD  •  melatonin tablet 3 mg, 3 mg, Oral, Nightly PRN, Beto Alvarado MD  •  ondansetron (ZOFRAN) tablet 4 mg, 4 mg, Oral, Q6H PRN **OR** ondansetron (ZOFRAN) injection 4 mg, 4 mg, Intravenous, Q6H PRN, Beto Alvarado MD  •  sennosides-docusate (PERICOLACE) 8.6-50 MG per tablet 2 tablet, 2 tablet, Oral, BID, Alvarado, Beto, MD, 2 tablet at 08/06/22 2111  •  [COMPLETED] Insert peripheral IV, , , Once **AND** sodium chloride 0.9 % flush 10 mL, 10 mL,  Intravenous, PRN, Angelito Wiley II, MD  •  vitamin B-12 (CYANOCOBALAMIN) tablet 1,000 mcg, 1,000 mcg, Oral, Daily, Beto Alvarado MD, 1,000 mcg at 08/06/22 2111    Current Medications:  Scheduled Meds:aspirin, 81 mg, Oral, Daily  atorvastatin, 10 mg, Oral, Daily  enoxaparin, 40 mg, Subcutaneous, Nightly  senna-docusate sodium, 2 tablet, Oral, BID  vitamin B-12, 1,000 mcg, Oral, Daily      Continuous Infusions:   PRN Meds:.•  acetaminophen  •  HYDROcodone-acetaminophen  •  melatonin  •  ondansetron **OR** ondansetron  •  [COMPLETED] Insert peripheral IV **AND** sodium chloride    Past Medical History:   Diagnosis Date   • Arthritis    • CAD (coronary artery disease)    • Cataracts, bilateral    • CPAP (continuous positive airway pressure) dependence    • Diverticulitis    • Fatigue    • GERD (gastroesophageal reflux disease)    • Hyperlipidemia    • Hypertension    • Knee pain    • PAOD (peripheral arterial occlusive disease) ([facility])    • Shortness of breath    • Sleep apnea    • Varicose veins        Past Surgical History:   Procedure Laterality Date   • ANGIOPLASTY  2002    Cerebral   • CARDIAC CATHETERIZATION     • CAROTID ARTERY ANGIOPLASTY  01/12/2001   • CATARACT EXTRACTION W/ INTRAOCULAR LENS IMPLANT Left 2/14/2017    Procedure: LT CATARACT PHACO EXTRACTION WITH INTRAOCULAR LENS IMPLANT;  Surgeon: Syed Tang MD;  Location: [facility];  Service:    • CATARACT EXTRACTION W/ INTRAOCULAR LENS IMPLANT Right 2/28/2017    Procedure: RT CATARACT PHACO EXTRACTION WITH INTRAOCULAR LENS IMPLANT;  Surgeon: Syed Tang MD;  Location: [facility];  Service:    • THROMBOENDARTERECTOMY Right 2001       Social History     Occupational History   • Not on file   Tobacco Use   • Smoking status: Never Smoker   • Smokeless tobacco: Never Used   Substance and Sexual Activity   • Alcohol use: Yes     Comment: moderate   • Drug use: No   • Sexual activity: Never      Social History     Social  History Narrative   • Not on file       Family History   Problem Relation Age of Onset   • Heart disease Other    • Cancer Other    • Breast cancer Mother          age 75   • Atrial fibrillation Father          age 82       Review of Systems:     Constitutional:  Denies fever, shaking or chills   Eyes:  Denies change in visual acuity   HEENT:  Denies nasal congestion or sore throat   Respiratory:  Denies cough or shortness of breath   Cardiovascular:  Denies chest pain or edema  Endocrine: Denies tremors, palpitations, intolerance of heat or cold, polyuria, polydipsia.  GI:  Denies abdominal pain, nausea, vomiting, bloody stools or diarrhea  :  Denies frequency, urgency, incontinence, retention, or nocturia.  Musculoskeletal:  Denies numbness tingling or loss of motor function except as above  Integument:  Denies rash, lesion or ulceration   Neurologic:  Denies headache or focal weakness, deficits  Heme:  Denies epistaxis, spontaneous or excessive bleeding, hematuria, melena, fatigue, enlarged or tender lymph nodes.      All other pertinent positives and negatives as noted above in HPI.    Physical Exam: 88 y.o. male    Vitals:    22 1517 22 1941 22 0424 22 0818   BP: 158/86 123/72 138/83 166/93   BP Location: Right arm Right arm Right arm Right arm   Patient Position: Lying Lying Lying Sitting   Pulse: 83 85 77 78   Resp: 18 18 18 18   Temp: 97.8 °F (36.6 °C) 99.3 °F (37.4 °C) 98.2 °F (36.8 °C) 97 °F (36.1 °C)   TempSrc: Oral Oral Oral Oral   SpO2: 93% 91% 93% 92%   Weight:       Height:         General:  Awake, alert. No acute distress.      Head/Neck:  Normocephalic, atraumatic.  Conjunctivae and sclerae clear.  Hearing adequate for the exam.  Neck is supple with normal ROM.    Psych:  Affect and demeanor appropriate.    CV:  Regular rate and rhythm.  Hemodynamically stable.    Lungs:  Good chest expansion, breathing unlabored.    Abdomen:  Soft.  Nontender,  nondistended.    Extremities: Left lower extremity: Skin at the left knee is benign.  He has a massive effusion.  He is diffusely tender around the knee, mostly anteriorly.  I think he may be able to feel a slight defect in the lateral quad tendon but at least two thirds of the quad tendon feels like it is intact.  He does not have any tenderness over the medial or lateral collateral ligaments.  I could not get any sort of ligamentous exam on him because he really cannot move the knee at all.  I do not feel any bony step-offs or palpable defects anywhere else around his knee.  His knee motion is extremely limited.  Basically I could only range him about a total arc of motion of 20 degrees, from roughly 10 degrees shy of full extension to about 30 degrees of flexion.  His calf is soft.  He has intact motor function in his foot.  Sensation is diminished but he says that is his baseline.  He has a palpable dorsalis pedis pulse and brisk capillary refill.    Right lower extremity: He has a large area of ecchymosis along the anteromedial aspect of his knee.  He is a little tender over this area but no other tenderness.  No palpable defects or step-offs.  Skin is otherwise benign.  Trace right knee effusion.  He can extend the knee fully and flex to about 100 degrees without significant discomfort.  His knee is stable.  Calf is soft.  Distal neurovascular exam is symmetric to the other side.    All other extremities atraumatic without gross abnormality.     Diagnostic Tests:    Admission on 08/06/2022   Component Date Value Ref Range Status   • Glucose 08/06/2022 112 (A) 65 - 99 mg/dL Final   • BUN 08/06/2022 20  8 - 23 mg/dL Final   • Creatinine 08/06/2022 1.10  0.76 - 1.27 mg/dL Final   • Sodium 08/06/2022 139  136 - 145 mmol/L Final   • Potassium 08/06/2022 4.3  3.5 - 5.2 mmol/L Final   • Chloride 08/06/2022 103  98 - 107 mmol/L Final   • CO2 08/06/2022 26.7  22.0 - 29.0 mmol/L Final   • Calcium 08/06/2022 9.2  8.6 -  10.5 mg/dL Final   • BUN/Creatinine Ratio 08/06/2022 18.2  7.0 - 25.0 Final   • Anion Gap 08/06/2022 9.3  5.0 - 15.0 mmol/L Final   • eGFR 08/06/2022 64.6  >60.0 mL/min/1.73 Final    National Kidney Foundation and American Society of Nephrology (ASN) Task Force recommended calculation based on the Chronic Kidney Disease Epidemiology Collaboration (CKD-EPI) equation refit without adjustment for race.   • WBC 08/06/2022 9.02  3.40 - 10.80 10*3/mm3 Final   • RBC 08/06/2022 4.36  4.14 - 5.80 10*6/mm3 Final   • Hemoglobin 08/06/2022 13.2  13.0 - 17.7 g/dL Final   • Hematocrit 08/06/2022 38.5  37.5 - 51.0 % Final   • MCV 08/06/2022 88.3  79.0 - 97.0 fL Final   • MCH 08/06/2022 30.3  26.6 - 33.0 pg Final   • MCHC 08/06/2022 34.3  31.5 - 35.7 g/dL Final   • RDW 08/06/2022 12.5  12.3 - 15.4 % Final   • RDW-SD 08/06/2022 40.0  37.0 - 54.0 fl Final   • MPV 08/06/2022 8.8  6.0 - 12.0 fL Final   • Platelets 08/06/2022 197  140 - 450 10*3/mm3 Final   • Neutrophil % 08/06/2022 78.4 (A) 42.7 - 76.0 % Final   • Lymphocyte % 08/06/2022 10.1 (A) 19.6 - 45.3 % Final   • Monocyte % 08/06/2022 9.5  5.0 - 12.0 % Final   • Eosinophil % 08/06/2022 1.1  0.3 - 6.2 % Final   • Basophil % 08/06/2022 0.3  0.0 - 1.5 % Final   • Immature Grans % 08/06/2022 0.6 (A) 0.0 - 0.5 % Final   • Neutrophils, Absolute 08/06/2022 7.07 (A) 1.70 - 7.00 10*3/mm3 Final   • Lymphocytes, Absolute 08/06/2022 0.91  0.70 - 3.10 10*3/mm3 Final   • Monocytes, Absolute 08/06/2022 0.86  0.10 - 0.90 10*3/mm3 Final   • Eosinophils, Absolute 08/06/2022 0.10  0.00 - 0.40 10*3/mm3 Final   • Basophils, Absolute 08/06/2022 0.03  0.00 - 0.20 10*3/mm3 Final   • Immature Grans, Absolute 08/06/2022 0.05  0.00 - 0.05 10*3/mm3 Final   • nRBC 08/06/2022 0.0  0.0 - 0.2 /100 WBC Final   • COVID19 08/06/2022 Not Detected  Not Detected - Ref. Range Final   • Glucose 08/07/2022 123 (A) 65 - 99 mg/dL Final   • BUN 08/07/2022 16  8 - 23 mg/dL Final   • Creatinine 08/07/2022 0.84  0.76 - 1.27  mg/dL Final   • Sodium 08/07/2022 134 (A) 136 - 145 mmol/L Final   • Potassium 08/07/2022 4.2  3.5 - 5.2 mmol/L Final   • Chloride 08/07/2022 101  98 - 107 mmol/L Final   • CO2 08/07/2022 22.0  22.0 - 29.0 mmol/L Final   • Calcium 08/07/2022 9.1  8.6 - 10.5 mg/dL Final   • BUN/Creatinine Ratio 08/07/2022 19.0  7.0 - 25.0 Final   • Anion Gap 08/07/2022 11.0  5.0 - 15.0 mmol/L Final   • eGFR 08/07/2022 83.9  >60.0 mL/min/1.73 Final    National Kidney Foundation and American Society of Nephrology (ASN) Task Force recommended calculation based on the Chronic Kidney Disease Epidemiology Collaboration (CKD-EPI) equation refit without adjustment for race.   • WBC 08/07/2022 11.27 (A) 3.40 - 10.80 10*3/mm3 Final   • RBC 08/07/2022 4.84  4.14 - 5.80 10*6/mm3 Final   • Hemoglobin 08/07/2022 14.1  13.0 - 17.7 g/dL Final   • Hematocrit 08/07/2022 42.7  37.5 - 51.0 % Final   • MCV 08/07/2022 88.2  79.0 - 97.0 fL Final   • MCH 08/07/2022 29.1  26.6 - 33.0 pg Final   • MCHC 08/07/2022 33.0  31.5 - 35.7 g/dL Final   • RDW 08/07/2022 12.8  12.3 - 15.4 % Final   • RDW-SD 08/07/2022 40.7  37.0 - 54.0 fl Final   • MPV 08/07/2022 9.2  6.0 - 12.0 fL Final   • Platelets 08/07/2022 197  140 - 450 10*3/mm3 Final     Lab Results (last 24 hours)     Procedure Component Value Units Date/Time    Basic Metabolic Panel [[id number]]  (Abnormal) Collected: 08/07/22 0744    Specimen: Blood Updated: 08/07/22 0850     Glucose 123 mg/dL      BUN 16 mg/dL      Creatinine 0.84 mg/dL      Sodium 134 mmol/L      Potassium 4.2 mmol/L      Chloride 101 mmol/L      CO2 22.0 mmol/L      Calcium 9.1 mg/dL      BUN/Creatinine Ratio 19.0     Anion Gap 11.0 mmol/L      eGFR 83.9 mL/min/1.73      Comment: National Kidney Foundation and American Society of Nephrology (ASN) Task Force recommended calculation based on the Chronic Kidney Disease Epidemiology Collaboration (CKD-EPI) equation refit without adjustment for race.       Narrative:      GFR Normal >60  Chronic  Kidney Disease <60  Kidney Failure <15      CBC (No Diff) [[id number]]  (Abnormal) Collected: 08/07/22 0744    Specimen: Blood Updated: 08/07/22 0846     WBC 11.27 10*3/mm3      RBC 4.84 10*6/mm3      Hemoglobin 14.1 g/dL      Hematocrit 42.7 %      MCV 88.2 fL      MCH 29.1 pg      MCHC 33.0 g/dL      RDW 12.8 %      RDW-SD 40.7 fl      MPV 9.2 fL      Platelets 197 10*3/mm3     COVID PRE-OP / PRE-PROCEDURE SCREENING ORDER (NO ISOLATION) - Swab, Nasopharynx [[id number]]  (Normal) Collected: 08/06/22 1225    Specimen: Swab from Nasopharynx Updated: 08/06/22 1250    Narrative:      The following orders were created for panel order COVID PRE-OP / PRE-PROCEDURE SCREENING ORDER (NO ISOLATION) - Swab, Nasopharynx.  Procedure                               Abnormality         Status                     ---------                               -----------         ------                     COVID-19,[facility]-HOUSE...[[id number]]  Normal              Final result                 Please view results for these tests on the individual orders.    COVID-19,[facility] CEPHEID/CHANDNI NP SWAB IN TRANSPORT MEDIA 8-12 HR TAT - Swab, Nasopharynx [[id number]]  (Normal) Collected: 08/06/22 1225    Specimen: Swab from Nasopharynx Updated: 08/06/22 1250     COVID19 Not Detected    Narrative:      Fact sheet for providers: https://www.fda.gov/media/090539/download    Fact sheet for patients: https://www.fda.gov/media/648067/download    Test performed by PCR.    Basic Metabolic Panel [[id number]]  (Abnormal) Collected: 08/06/22 1111    Specimen: Blood Updated: 08/06/22 1139     Glucose 112 mg/dL      BUN 20 mg/dL      Creatinine 1.10 mg/dL      Sodium 139 mmol/L      Potassium 4.3 mmol/L      Chloride 103 mmol/L      CO2 26.7 mmol/L      Calcium 9.2 mg/dL      BUN/Creatinine Ratio 18.2     Anion Gap 9.3 mmol/L      eGFR 64.6 mL/min/1.73      Comment: National Kidney Foundation and American Society of Nephrology (ASN) Task Force recommended  calculation based on the Chronic Kidney Disease Epidemiology Collaboration (CKD-EPI) equation refit without adjustment for race.       Narrative:      GFR Normal >60  Chronic Kidney Disease <60  Kidney Failure <15      CBC & Differential [[id number]]  (Abnormal) Collected: 08/06/22 1111    Specimen: Blood Updated: 08/06/22 1120    Narrative:      The following orders were created for panel order CBC & Differential.  Procedure                               Abnormality         Status                     ---------                               -----------         ------                     CBC Auto Differential[[id number]]        Abnormal            Final result                 Please view results for these tests on the individual orders.    CBC Auto Differential [[id number]]  (Abnormal) Collected: 08/06/22 1111    Specimen: Blood Updated: 08/06/22 1120     WBC 9.02 10*3/mm3      RBC 4.36 10*6/mm3      Hemoglobin 13.2 g/dL      Hematocrit 38.5 %      MCV 88.3 fL      MCH 30.3 pg      MCHC 34.3 g/dL      RDW 12.5 %      RDW-SD 40.0 fl      MPV 8.8 fL      Platelets 197 10*3/mm3      Neutrophil % 78.4 %      Lymphocyte % 10.1 %      Monocyte % 9.5 %      Eosinophil % 1.1 %      Basophil % 0.3 %      Immature Grans % 0.6 %      Neutrophils, Absolute 7.07 10*3/mm3      Lymphocytes, Absolute 0.91 10*3/mm3      Monocytes, Absolute 0.86 10*3/mm3      Eosinophils, Absolute 0.10 10*3/mm3      Basophils, Absolute 0.03 10*3/mm3      Immature Grans, Absolute 0.05 10*3/mm3      nRBC 0.0 /100 WBC           Imaging: Bilateral AP and lateral views of the knees are reviewed on the [employer] system along with the reports.  No comparison films are available.  He has severe osteoarthritis in both knees.  It looks like he has end-stage bone-on-bone arthritis of the patellofemoral compartment and at least moderate tibiofemoral arthritis.  I do not see any acute abnormalities.    Assessment: 1.  Left knee contusion, hemarthrosis, possible occult  fracture 2.  Right knee contusion and hematoma    Plan: He could have a partial quad tear or an intra-articular injury.  I cannot get a good exam on him and therefore its very difficult to tell at this point.  An MRI has been ordered.  Once we have the results of that study, we can formulate a plan as far as how to manage this.  I think at the very least were going to have to consider aspirating his hemarthrosis.  Thank for the consultation.  Please call with any questions or concerns.    Date: 8/7/2022    Grodon Kay MD    CC: Beto Alvarado MD

## 2022-08-07 NOTE — PLAN OF CARE
Goal Outcome Evaluation:  Plan of Care Reviewed With: patient        Progress: no change  Outcome Evaluation: Uneventful night.  No PRN intervention for pain required.  R knee bruised and more edematous than L.  Per patient, fell on norma knee.  Unable to bare weight on L foot.  BLE elevated on pillows.  Urinal at bedside.  Urine concentrated.  Encourage PO intake.  MRI knee has not been done tonight.  Ortho consulted.

## 2022-08-07 NOTE — PROGRESS NOTES
Name: Max Shah ADMIT: 2022   : 1934  PCP: Karli Alonzo APRN    MRN: [id number] LOS: 0 days   AGE/SEX: 88 y.o. male  ROOM: [facility]     Subjective   Subjective     No events overnight. Pt complains of some constipation and wants to try milk of magnesia        Objective   Objective   Vital Signs  Temp:  [97 °F (36.1 °C)-99.3 °F (37.4 °C)] 98.2 °F (36.8 °C)  Heart Rate:  [77-86] 86  Resp:  [18] 18  BP: (113-166)/(67-93) 113/67  SpO2:  [91 %-93 %] 92 %  on   ;   Device (Oxygen Therapy): room air  Body mass index is 25.1 kg/m².  Physical Exam  Constitutional:       General: He is not in acute distress.     Appearance: He is not toxic-appearing.   HENT:      Head:      Comments: Quarter sized bump on left forehead  Cardiovascular:      Rate and Rhythm: Normal rate and regular rhythm.      Heart sounds: Normal heart sounds.   Pulmonary:      Effort: Pulmonary effort is normal.      Breath sounds: Normal breath sounds.   Abdominal:      General: Bowel sounds are normal.      Palpations: Abdomen is soft.   Musculoskeletal:         General: Swelling and signs of injury present.      Right lower leg: No edema.      Left lower leg: No edema.      Comments: Left knee swollen   Neurological:      Mental Status: He is alert.   Psychiatric:         Mood and Affect: Mood normal.         Behavior: Behavior normal.         Results Review     I reviewed the patient's new clinical results.  Results from last 7 days   Lab Units 22  0744 22  1111   WBC 10*3/mm3 11.27* 9.02   HEMOGLOBIN g/dL 14.1 13.2   PLATELETS 10*3/mm3 197 197     Results from last 7 days   Lab Units 22  0744 22  1111   SODIUM mmol/L 134* 139   POTASSIUM mmol/L 4.2 4.3   CHLORIDE mmol/L 101 103   CO2 mmol/L 22.0 26.7   BUN mg/dL 16 20   CREATININE mg/dL 0.84 1.10   GLUCOSE mg/dL 123* 112*   Estimated Creatinine Clearance: 70.2 mL/min (by C-G formula based on SCr of 0.84 mg/dL).    Results from last 7 days   Lab Units  08/07/22  0744 08/06/22  1111   CALCIUM mg/dL 9.1 9.2       COVID19   Date Value Ref Range Status   08/06/2022 Not Detected Not Detected - Ref. Range Final     No results found for: HGBA1C, POCGLU    CT Head Without Contrast, CT Cervical Spine Without Contrast  Narrative: CT HEAD AND CERVICAL SPINE WITHOUT CONTRAST     HISTORY: Fall, head and neck injury     TECHNIQUE: Radiation dose reduction techniques were utilized, including  automated exposure control and exposure modulation based on body size.  CT scan of the head and cervical spine was performed without IV  contrast.      COMPARISON: None available     FINDINGS:  CT HEAD:  There is no evidence of intracranial hemorrhage, hydrocephalus, focal  mass lesion or acute cortical-based infarction. Prominence of the  ventricles and CSF spaces consistent with age-appropriate volume loss.  Previous cataract surgeries. Opacification of a few ethmoid air cells.  The mastoids are clear. Carotid siphon calcifications. Calvarium intact.     CT CERVICAL SPINE:  There is no evidence of acute fracture or traumatic subluxation. There  is no prevertebral soft tissue swelling. There is rightward curvature of  the cervical spine. Extensive facet hypertrophy is demonstrated on the  left with fusion of multiple facet joints. Multilevel degenerative disc  space narrowing, greatest in the lower cervical spine with areas of  vacuum disc phenomenon. Some mild central canal narrowing is seen in the  lower cervical spine. Carotid calcifications are present. Lung apices  are clear.     Impression: 1. No acute findings in the head or cervical spine  2. Chronic changes as described above     Radiation dose reduction techniques were utilized, including automated  exposure control and exposure modulation based on body size.     This report was finalized on 8/6/2022 11:19 AM by Dr. Leonid Bettencourt M.D.     XR Knee 1 or 2 View Bilateral  Narrative: BILATERAL KNEES, 2 VIEWS EACH     HISTORY:  Bilateral knee pain     COMPARISON: None available     FINDINGS:  Right knee:  Tricompartmental degenerative changes, greatest in the patellofemoral  compartment. No evidence of fracture or joint effusion.     Left knee:  Tricompartmental degenerative changes, greatest in the patellofemoral  compartment. No evidence of fracture or joint effusion.     Impression: Bilateral degenerative changes     This report was finalized on 8/6/2022 11:08 AM by Dr. Leonid Bettencourt M.D.       Scheduled Medications  aspirin, 81 mg, Oral, Daily  atorvastatin, 10 mg, Oral, Daily  enoxaparin, 40 mg, Subcutaneous, Nightly  senna-docusate sodium, 2 tablet, Oral, BID  vitamin B-12, 1,000 mcg, Oral, Daily    Infusions   Diet  Diet Regular       Assessment/Plan     Active Hospital Problems    Diagnosis  POA   • **Left knee injury [S89.92XA]  Yes   • Fall [W19.XXXA]  Yes   • Right bundle branch block [I45.10]  Yes   • Obstructive sleep apnea [G47.33]  Yes   • Stenosis of carotid artery [I65.29]  Yes   • Hypertension [I10]  Yes   • CAD (coronary artery disease) [I25.10]  Yes      Resolved Hospital Problems   No resolved problems to display.       88 y.o. male admitted with Left knee injury.    · Left knee injury-MRI is pending. Ortho is following. Pain control. Bowel regimen.  · Hypertension-apparently not taking any BP medications at home anymore. Adequately controlled acutely  · Coronary artery disease-aspirin/statin. Normal stress test last month.  · Carotid artery stenosis s/p intervention in 2001. 60-70% bilateral stenosis on most recently ultrasound  · MARKO-continue cpap if available  · Chronic RBBB  · Constipation-try a dose of milk of magnesia   · Lovenox 40 mg SC daily for DVT prophylaxis.  · Full code.  · Discussed with patient and nursing staff.  · Anticipate discharge TBD timing yet to be determined.      Beto Alvarado MD  [facility]  08/07/22  15:40 EDT    I wore protective equipment throughout this patient  encounter including a face mask, gloves and protective eyewear.  Hand hygiene was performed before donning protective equipment and after removal when leaving the room.

## 2022-08-08 PROCEDURE — 3E0U3BZ INTRODUCTION OF ANESTHETIC AGENT INTO JOINTS, PERCUTANEOUS APPROACH: ICD-10-PCS | Performed by: ORTHOPAEDIC SURGERY

## 2022-08-08 PROCEDURE — 3E0U33Z INTRODUCTION OF ANTI-INFLAMMATORY INTO JOINTS, PERCUTANEOUS APPROACH: ICD-10-PCS | Performed by: ORTHOPAEDIC SURGERY

## 2022-08-08 PROCEDURE — G0378 HOSPITAL OBSERVATION PER HR: HCPCS

## 2022-08-08 PROCEDURE — 97162 PT EVAL MOD COMPLEX 30 MIN: CPT

## 2022-08-08 PROCEDURE — 25010000002 METHYLPREDNISOLONE PER 80 MG: Performed by: ORTHOPAEDIC SURGERY

## 2022-08-08 PROCEDURE — 97530 THERAPEUTIC ACTIVITIES: CPT

## 2022-08-08 PROCEDURE — 97535 SELF CARE MNGMENT TRAINING: CPT

## 2022-08-08 PROCEDURE — 97166 OT EVAL MOD COMPLEX 45 MIN: CPT

## 2022-08-08 RX ORDER — LIDOCAINE HYDROCHLORIDE 10 MG/ML
5 INJECTION, SOLUTION EPIDURAL; INFILTRATION; INTRACAUDAL; PERINEURAL ONCE
Status: COMPLETED | OUTPATIENT
Start: 2022-08-08 | End: 2022-08-08

## 2022-08-08 RX ORDER — BISACODYL 10 MG
10 SUPPOSITORY, RECTAL RECTAL ONCE
Status: DISCONTINUED | OUTPATIENT
Start: 2022-08-08 | End: 2022-08-11 | Stop reason: HOSPADM

## 2022-08-08 RX ORDER — METHYLPREDNISOLONE ACETATE 80 MG/ML
80 INJECTION, SUSPENSION INTRA-ARTICULAR; INTRALESIONAL; INTRAMUSCULAR; SOFT TISSUE ONCE
Status: COMPLETED | OUTPATIENT
Start: 2022-08-08 | End: 2022-08-08

## 2022-08-08 RX ADMIN — Medication 1000 MCG: at 08:19

## 2022-08-08 RX ADMIN — DOCUSATE SODIUM 50MG AND SENNOSIDES 8.6MG 2 TABLET: 8.6; 5 TABLET, FILM COATED ORAL at 08:20

## 2022-08-08 RX ADMIN — LIDOCAINE HYDROCHLORIDE 2 ML: 10 INJECTION, SOLUTION EPIDURAL; INFILTRATION; INTRACAUDAL; PERINEURAL at 18:50

## 2022-08-08 RX ADMIN — ACETAMINOPHEN 650 MG: 325 TABLET, FILM COATED ORAL at 21:31

## 2022-08-08 RX ADMIN — Medication 3 MG: at 21:31

## 2022-08-08 RX ADMIN — ACETAMINOPHEN 650 MG: 325 TABLET, FILM COATED ORAL at 17:32

## 2022-08-08 RX ADMIN — ATORVASTATIN CALCIUM 10 MG: 20 TABLET, FILM COATED ORAL at 08:20

## 2022-08-08 RX ADMIN — ASPIRIN 81 MG: 81 TABLET, FILM COATED ORAL at 08:20

## 2022-08-08 RX ADMIN — METHYLPREDNISOLONE ACETATE 80 MG: 80 INJECTION, SUSPENSION INTRA-ARTICULAR; INTRALESIONAL; INTRAMUSCULAR; SOFT TISSUE at 18:50

## 2022-08-08 RX ADMIN — DOCUSATE SODIUM 50MG AND SENNOSIDES 8.6MG 2 TABLET: 8.6; 5 TABLET, FILM COATED ORAL at 21:31

## 2022-08-08 NOTE — THERAPY EVALUATION
Patient Name: Max Shah  : 1934    MRN: [id number]                              Today's Date: 2022       Admit Date: 2022    Visit Dx:     ICD-10-CM ICD-9-CM   1. Acute pain of left knee  M25.562 719.46   2. Fall, initial encounter  W19.XXXA E888.9   3. Unable to ambulate  R26.2 719.7     Patient Active Problem List   Diagnosis   • Hypertension   • CAD (coronary artery disease)   • GERD (gastroesophageal reflux disease)   • Chronic fatigue   • Hand pain, right   • Stenosis of carotid artery   • Diastolic dysfunction   • Hyperlipidemia   • Pulmonary hypertension (HCC)   • Obstructive sleep apnea   • Lightheadedness   • Paresthesia of both feet   • Elevated glucose   • Leg weakness, bilateral   • Balance disorder   • Fall   • Left knee injury   • Right bundle branch block     Past Medical History:   Diagnosis Date   • Arthritis    • CAD (coronary artery disease)    • Cataracts, bilateral    • CPAP (continuous positive airway pressure) dependence    • Diverticulitis    • Fatigue    • GERD (gastroesophageal reflux disease)    • Hyperlipidemia    • Hypertension    • Knee pain    • PAOD (peripheral arterial occlusive disease) ([facility])    • Shortness of breath    • Sleep apnea    • Varicose veins      Past Surgical History:   Procedure Laterality Date   • ANGIOPLASTY      Cerebral   • CARDIAC CATHETERIZATION     • CAROTID ARTERY ANGIOPLASTY  2001   • CATARACT EXTRACTION W/ INTRAOCULAR LENS IMPLANT Left 2017    Procedure: LT CATARACT PHACO EXTRACTION WITH INTRAOCULAR LENS IMPLANT;  Surgeon: Syed Tang MD;  Location: [facility], [place], operated by [facility];  Service:    • CATARACT EXTRACTION W/ INTRAOCULAR LENS IMPLANT Right 2017    Procedure: RT CATARACT PHACO EXTRACTION WITH INTRAOCULAR LENS IMPLANT;  Surgeon: Syed Tang MD;  Location: [facility], [place], operated by [facility];  Service:    • THROMBOENDARTERECTOMY Right       General Information     Row Name 22 0925          OT Time and Intention     Document Type evaluation  -     Mode of Treatment occupational therapy;physical therapy;co-treatment  2/2 pain and decreased activity tolerance with attempted mobility with nursing staff  -     Row Name 08/08/22 0925          General Information    Patient Profile Reviewed yes  -SM     Prior Level of Function independent:;ADL's  -     Existing Precautions/Restrictions fall  -     Row Name 08/08/22 0925          Occupational Profile    Environmental Supports and Barriers (Occupational Profile) Pt reports home DME includes grab bars and shower chair used by his spouse  -     Row Name 08/08/22 0925          Living Environment    People in Home alone  spouse is in rehab/SNF  -     Row Name 08/08/22 0925          Home Main Entrance    Number of Stairs, Main Entrance two  -     Row Name 08/08/22 0925          Stairs Within Home, Primary    Number of Stairs, Within Home, Primary none  -     Row Name 08/08/22 0925          Cognition    Orientation Status (Cognition) oriented x 3  -     Row Name 08/08/22 0925          Safety Issues, Functional Mobility    Safety Issues Affecting Function (Mobility) safety precaution awareness;insight into deficits/self-awareness;safety precautions follow-through/compliance;awareness of need for assistance;positioning of assistive device  needs several cues for safe technique  -     Impairments Affecting Function (Mobility) balance;endurance/activity tolerance;strength;pain  -SM           User Key  (r) = Recorded By, (t) = Taken By, (c) = Cosigned By    Initials Name Provider Type     Esther Valdez OT Occupational Therapist                 Mobility/ADL's     Row Name 08/08/22 0927          Bed Mobility    Bed Mobility supine-sit  -     Supine-Sit [place] (Bed Mobility) minimum assist (75% patient effort);verbal cues  -     Assistive Device (Bed Mobility) head of bed elevated;bed rails  -     Comment, (Bed Mobility) with extra time  -     Row Name  08/08/22 0927          Transfers    Transfers sit-stand transfer;toilet transfer  -     Sit-Stand [place] (Transfers) moderate assist (50% patient effort);2 person assist;verbal cues;nonverbal cues (demo/gesture)  -     [place] Level (Toilet Transfer) minimum assist (75% patient effort);moderate assist (50% patient effort);2 person assist  -     Assistive Device (Toilet Transfer) walker, 4-wheeled;commode, bedside without drop arms  -     Row Name 08/08/22 0927          Sit-Stand Transfer    Assistive Device (Sit-Stand Transfers) walker, 4-wheeled  -[facility] Name 08/08/22 0927          Functional Mobility    Functional Mobility- Comment Pt able to complete X2 pivot transfers today, difficulty bearing weight to LEs due to pain  -[facility] Name 08/08/22 0927          Activities of Daily Living    BADL Assessment/Intervention lower body dressing;grooming;toileting  -[facility] Name 08/08/22 0927          Lower Body Dressing Assessment/Training    [place] Level (Lower Body Dressing) lower body dressing skills;don;socks;dependent (less than 25% patient effort)  -     Position (Lower Body Dressing) edge of bed sitting  -[facility] Name 08/08/22 0927          Grooming Assessment/Training    [place] Level (Grooming) grooming skills;set up;hair care, combing/brushing  -     Position (Grooming) supported sitting  -[facility] Name 08/08/22 0927          Toileting Assessment/Training    [place] Level (Toileting) toileting skills;adjust/manage clothing;perform perineal hygiene;dependent (less than 25% patient effort)  -     Assistive Devices (Toileting) commode, bedside without drop arms  -           User Key  (r) = Recorded By, (t) = Taken By, (c) = Cosigned By    Initials Name Provider Type     Esther Valdez OT Occupational Therapist               Obj/Interventions     Row Name 08/08/22 0929          Range of Motion Comprehensive    General Range of Motion bilateral upper  extremity ROM WFL  -[facility] Name 08/08/22 0929          Strength Comprehensive (MMT)    Comment, General Manual Muscle Testing (MMT) Assessment BUE > or = 3/5  -[facility] Name 08/08/22 0929          Motor Skills    Motor Skills functional endurance  -     Functional Endurance fair -  -[facility] Name 08/08/22 0929          Balance    Balance Assessment sitting static balance;sitting dynamic balance;standing static balance;standing dynamic balance  -     Static Sitting Balance standby assist  -     Dynamic Sitting Balance contact guard  -     Position, Sitting Balance sitting edge of bed  -     Static Standing Balance contact guard;minimal assist;1-person assist  -     Dynamic Standing Balance minimal assist;moderate assist;2-person assist  -     Position/Device Used, Standing Balance supported;walker, rolling  -           User Key  (r) = Recorded By, (t) = Taken By, (c) = Cosigned By    Initials Name Provider Type    Esther Krishnan OT Occupational Therapist               Goals/Plan     Row Name 08/08/22 0934          Transfer Goal 1 (OT)    Activity/Assistive Device (Transfer Goal 1, OT) transfers, all;toilet  -     [place] Level/Cues Needed (Transfer Goal 1, OT) minimum assist (75% or more patient effort)  -SM     Time Frame (Transfer Goal 1, OT) short term goal (STG);2 weeks  -SM     Progress/Outcome (Transfer Goal 1, OT) goal ongoing  -SM     Row Name 08/08/22 0934          Bathing Goal 1 (OT)    Activity/Device (Bathing Goal 1, OT) bathing skills, all  -SM     [place] Level/Cues Needed (Bathing Goal 1, OT) minimum assist (75% or more patient effort)  -SM     Time Frame (Bathing Goal 1, OT) short term goal (STG);2 weeks  -SM     Progress/Outcomes (Bathing Goal 1, OT) goal ongoing  -[facility] Name 08/08/22 0934          Dressing Goal 1 (OT)    Activity/Device (Dressing Goal 1, OT) dressing skills, all  -SM     [place]/Cues Needed (Dressing Goal 1, OT) minimum assist  (75% or more patient effort)  -SM     Time Frame (Dressing Goal 1, OT) short term goal (STG);2 weeks  -SM     Progress/Outcome (Dressing Goal 1, OT) goal ongoing  -[facility] Name 08/08/22 0934          Toileting Goal 1 (OT)    Activity/Device (Toileting Goal 1, OT) toileting skills, all  -     [place] Level/Cues Needed (Toileting Goal 1, OT) minimum assist (75% or more patient effort)  -SM     Time Frame (Toileting Goal 1, OT) short term goal (STG);2 weeks  -SM     Progress/Outcome (Toileting Goal 1, OT) goal ongoing  -     Row Name 08/08/22 0934          Therapy Assessment/Plan (OT)    Planned Therapy Interventions (OT) activity tolerance training;adaptive equipment training;BADL retraining;functional balance retraining;occupation/activity based interventions;patient/caregiver education/training;transfer/mobility retraining  -           User Key  (r) = Recorded By, (t) = Taken By, (c) = Cosigned By    Initials Name Provider Type    SM Esther Valdez OT Occupational Therapist               Clinical Impression     [facility] Name 08/08/22 0929          Pain Assessment    Pain Intervention(s) Repositioned;Nursing Notified;Ambulation/increased activity  -     Additional Documentation Pain Scale: FACES Pre/Post-Treatment (Group)  -SM     Row Name 08/08/22 0929          Pain Scale: FACES Pre/Post-Treatment    Pain: FACES Scale, Pretreatment 2-->hurts little bit  -     Posttreatment Pain Rating 6-->hurts even more  -[facility] Name 08/08/22 0929          Plan of Care Review    Plan of Care Reviewed With patient  -     Outcome Evaluation Pt is a 88 y.o male admitted to [facility] after falling on both knees at home. 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 Pt able is typically independent with ADL and all mobility without AD. Today pt requires X2 person assist to pivot to BS and recliner. Total A for LBD and toileting today 2/2  decreased LE strength, ROM, and pain. Pt would benefit from continued OT to address deficits with ADLs and transfers. He is not safe to dc home at this time and will need SNF at [place].  -     Row Name 08/08/22 0929          Therapy Assessment/Plan (OT)    Rehab Potential (OT) good, to achieve stated therapy goals  -     Criteria for Skilled Therapeutic Interventions Met (OT) yes;skilled treatment is necessary  -     Therapy Frequency (OT) 5 times/wk  -     Row Name 08/08/22 0929          Therapy Plan Review/Discharge Plan (OT)    Anticipated Discharge Disposition (OT) skilled nursing facility  -     Row Name 08/08/22 0929          Positioning and Restraints    Pre-Treatment Position in bed  -     Post Treatment Position chair  -SM     In Chair reclined;call light within reach;encouraged to call for assist;exit alarm on;notified nsg  -           User Key  (r) = Recorded By, (t) = Taken By, (c) = Cosigned By    Initials Name Provider Type    Esther Krishnan OT Occupational Therapist               Outcome Measures     Row Name 08/08/22 0935          How much help from another is currently needed...    Putting on and taking off regular lower body clothing? 1  -SM     Bathing (including washing, rinsing, and drying) 2  -SM     Toileting (which includes using toilet bed pan or urinal) 1  -SM     Putting on and taking off regular upper body clothing 3  -SM     Taking care of personal grooming (such as brushing teeth) 3  -SM     Eating meals 4  -SM     AM-PAC 6 Clicks Score (OT) 14  -[facility] Name 08/08/22 0935          Functional Assessment    Outcome Measure Options AM-PAC 6 Clicks Daily Activity (OT)  -           User Key  (r) = Recorded By, (t) = Taken By, (c) = Cosigned By    Initials Name Provider Type    Esther Krishnan OT Occupational Therapist                Occupational Therapy Education                 Title: PT OT SLP Therapies (In Progress)     Topic: Occupational Therapy (In  Progress)     Point: ADL training (Done)     Description:   Instruct learner(s) on proper safety adaptation and remediation techniques during self care or transfers.   Instruct in proper use of assistive devices.              Learning Progress Summary           Patient Acceptance, E, VU by  at 8/8/2022 0920    Comment: OT goals, role of OT, dc recommendations. Falls precautions and use of call light for assist.                   Point: Home exercise program (Not Started)     Description:   Instruct learner(s) on appropriate technique for monitoring, assisting and/or progressing therapeutic exercises/activities.              Learner Progress:  Not documented in this visit.          Point: Precautions (Not Started)     Description:   Instruct learner(s) on prescribed precautions during self-care and functional transfers.              Learner Progress:  Not documented in this visit.          Point: Body mechanics (Not Started)     Description:   Instruct learner(s) on proper positioning and spine alignment during self-care, functional mobility activities and/or exercises.              Learner Progress:  Not documented in this visit.                      User Key     Initials Effective Dates Name Provider Type Discipline     04/02/20 -  Esther Valdez OT Occupational Therapist OT              OT Recommendation and Plan  Planned Therapy Interventions (OT): activity tolerance training, adaptive equipment training, BADL retraining, functional balance retraining, occupation/activity based interventions, patient/caregiver education/training, transfer/mobility retraining  Therapy Frequency (OT): 5 times/wk  Plan of Care Review  Plan of Care Reviewed With: patient  Outcome Evaluation: Pt is a 88 y.o male admitted to [facility] after falling on both knees at home. 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 Pt able is typically independent with  ADL and all mobility without AD. Today pt requires X2 person assist to pivot to BSC and recliner. Total A for LBD and toileting today 2/2 decreased LE strength, ROM, and pain. Pt would benefit from continued OT to address deficits with ADLs and transfers. He is not safe to dc home at this time and will need SNF at [place].     Time Calculation:    Time Calculation- OT     Row Name 08/08/22 0936             Time Calculation- OT    OT Start Time 0848  -SM      OT Stop Time 0907  -SM      OT Time Calculation (min) 19 min  -SM      Total Timed Code Minutes- OT 12 minute(s)  -SM      OT Received On 08/08/22  -      OT - Next Appointment 08/09/22  -      OT Goal Re-Cert Due Date 08/22/22  -SM              Timed Charges    99460 - OT Self Care/Mgmt Minutes 12  -SM              Untimed Charges    OT Eval/Re-eval Minutes 7  -SM              Total Minutes    Timed Charges Total Minutes 12  -SM      Untimed Charges Total Minutes 7  -SM       Total Minutes 19  -SM            User Key  (r) = Recorded By, (t) = Taken By, (c) = Cosigned By    Initials Name Provider Type     Esther Valdez OT Occupational Therapist              Therapy Charges for Today     Code Description Service Date Service Provider Modifiers Qty    [id number] HC OT SELF CARE/MGMT/TRAIN EA 15 MIN 8/8/2022 Esther Valdez OT GO 1    [id number] HC OT EVAL MOD COMPLEXITY 2 8/8/2022 Esther Valdez OT GO 1               Esther Valdez OT  8/8/2022

## 2022-08-08 NOTE — THERAPY EVALUATION
Patient Name: Max Shah  : 1934    MRN: [id number]                              Today's Date: 2022       Admit Date: 2022    Visit Dx:     ICD-10-CM ICD-9-CM   1. Acute pain of left knee  M25.562 719.46   2. Fall, initial encounter  W19.XXXA E888.9   3. Unable to ambulate  R26.2 719.7     Patient Active Problem List   Diagnosis   • Hypertension   • CAD (coronary artery disease)   • GERD (gastroesophageal reflux disease)   • Chronic fatigue   • Hand pain, right   • Stenosis of carotid artery   • Diastolic dysfunction   • Hyperlipidemia   • Pulmonary hypertension (HCC)   • Obstructive sleep apnea   • Lightheadedness   • Paresthesia of both feet   • Elevated glucose   • Leg weakness, bilateral   • Balance disorder   • Fall   • Left knee injury   • Right bundle branch block     Past Medical History:   Diagnosis Date   • Arthritis    • CAD (coronary artery disease)    • Cataracts, bilateral    • CPAP (continuous positive airway pressure) dependence    • Diverticulitis    • Fatigue    • GERD (gastroesophageal reflux disease)    • Hyperlipidemia    • Hypertension    • Knee pain    • PAOD (peripheral arterial occlusive disease) ([facility])    • Shortness of breath    • Sleep apnea    • Varicose veins      Past Surgical History:   Procedure Laterality Date   • ANGIOPLASTY      Cerebral   • CARDIAC CATHETERIZATION     • CAROTID ARTERY ANGIOPLASTY  2001   • CATARACT EXTRACTION W/ INTRAOCULAR LENS IMPLANT Left 2017    Procedure: LT CATARACT PHACO EXTRACTION WITH INTRAOCULAR LENS IMPLANT;  Surgeon: Syed Tang MD;  Location: [facility];  Service:    • CATARACT EXTRACTION W/ INTRAOCULAR LENS IMPLANT Right 2017    Procedure: RT CATARACT PHACO EXTRACTION WITH INTRAOCULAR LENS IMPLANT;  Surgeon: Syed Tang MD;  Location: [facility];  Service:    • THROMBOENDARTERECTOMY Right       General Information     Row Name 22 0942          Physical Therapy Time and  Intention    Document Type evaluation  -CB     Mode of Treatment occupational therapy;physical therapy;co-treatment  -CB     Row Name 08/08/22 0942          General Information    Patient Profile Reviewed yes  -CB     Existing Precautions/Restrictions fall  -CB     Barriers to Rehab none identified  -CB     Row Name 08/08/22 0942          Living Environment    People in Home alone  wife is at rehab/SNF  -CB     Row Name 08/08/22 0942          Home Main Entrance    Number of Stairs, Main Entrance two  -CB     Stair Railings, Main Entrance railings safe and in good condition  -CB     Row Name 08/08/22 0942          Cognition    Orientation Status (Cognition) oriented x 3  -CB     Row Name 08/08/22 0942          Safety Issues, Functional Mobility    Safety Issues Affecting Function (Mobility) awareness of need for assistance;insight into deficits/self-awareness;positioning of assistive device;problem-solving;safety precaution awareness;safety precautions follow-through/compliance;sequencing abilities  -CB     Impairments Affecting Function (Mobility) balance;endurance/activity tolerance;strength;pain  -CB           User Key  (r) = Recorded By, (t) = Taken By, (c) = Cosigned By    Initials Name Provider Type    CB Vianey Hager, PT Physical Therapist               Mobility     Row Name 08/08/22 0942          Bed Mobility    Bed Mobility supine-sit  -CB     Supine-Sit [place] (Bed Mobility) minimum assist (75% patient effort);verbal cues  -CB     Assistive Device (Bed Mobility) head of bed elevated;bed rails  -CB     Row Name 08/08/22 0942          Bed-Chair Transfer    Bed-Chair [place] (Transfers) minimum assist (75% patient effort);2 person assist;verbal cues;nonverbal cues (demo/gesture)  -CB     Assistive Device (Bed-Chair Transfers) walker, front-wheeled  -CB     Comment, (Bed-Chair Transfer) bed>BSC>chair; cues for LE sequencing to BSC and chair  -CB     Row Name 08/08/22 0942          Sit-Stand  Transfer    Sit-Stand [place] (Transfers) moderate assist (50% patient effort);2 person assist;verbal cues;nonverbal cues (demo/gesture)  -CB     Assistive Device (Sit-Stand Transfers) walker, front-wheeled  -CB     Comment, (Sit-Stand Transfer) cues for UE placement during STS  -CB     Row Name 08/08/22 0942          Mobility    Extremity Weight-bearing Status --  WBAT BLE  -CB           User Key  (r) = Recorded By, (t) = Taken By, (c) = Cosigned By    Initials Name Provider Type    Vianey Lyons PT Physical Therapist               Obj/Interventions     Row Name 08/08/22 0943          Range of Motion Comprehensive    Comment, General Range of Motion LE WFL but pain with full knee ext  -CB     Row Name 08/08/22 0943          Strength Comprehensive (MMT)    Comment, General Manual Muscle Testing (MMT) Assessment grossly 3+/5 except L knee ext limited by pain and MMT not completed  -CB     Row Name 08/08/22 0943          Balance    Balance Assessment standing static balance;standing dynamic balance;sitting static balance;sitting dynamic balance  -CB     Static Sitting Balance standby assist  -CB     Dynamic Sitting Balance contact guard  -CB     Position, Sitting Balance sitting edge of bed  -CB     Static Standing Balance contact guard;minimal assist;1-person assist;verbal cues  -CB     Dynamic Standing Balance minimal assist;moderate assist;2-person assist;verbal cues  -CB     Position/Device Used, Standing Balance supported;walker, front-wheeled  -CB     Balance Interventions sitting;standing;sit to stand;supported;static;dynamic;minimal challenge  -CB     Row Name 08/08/22 0943          Sensory Assessment (Somatosensory)    Sensory Assessment (Somatosensory) sensation intact  -CB           User Key  (r) = Recorded By, (t) = Taken By, (c) = Cosigned By    Initials Name Provider Type    Vianey Lyons, [place] NENA Physical Therapist               Goals/Plan     Row Name 08/08/22 0950          Bed Mobility Goal 1  (PT)    Activity/Assistive Device (Bed Mobility Goal 1, PT) bed mobility activities, all  -CB     [place] Level/Cues Needed (Bed Mobility Goal 1, PT) modified independence  -CB     Time Frame (Bed Mobility Goal 1, PT) long term goal (LTG);1 week  -CB     Row Name 08/08/22 0950          Transfer Goal 1 (PT)    Activity/Assistive Device (Transfer Goal 1, PT) sit-to-stand/stand-to-sit;bed-to-chair/chair-to-bed  -CB     [place] Level/Cues Needed (Transfer Goal 1, PT) contact guard required  -CB     Time Frame (Transfer Goal 1, PT) long term goal (LTG);1 week  -CB     Row Name 08/08/22 0950          Gait Training Goal 1 (PT)    Activity/Assistive Device (Gait Training Goal 1, PT) gait (walking locomotion);walker, rolling  -CB     [place] Level (Gait Training Goal 1, PT) minimum assist (75% or more patient effort)  -CB     Distance (Gait Training Goal 1, PT) 50ft  -CB     Time Frame (Gait Training Goal 1, PT) long term goal (LTG);1 week  -CB     Row Name 08/08/22 0950          Therapy Assessment/Plan (PT)    Planned Therapy Interventions (PT) balance training;bed mobility training;gait training;home exercise program;patient/family education;strengthening;transfer training;ROM (range of motion)  -CB           User Key  (r) = Recorded By, (t) = Taken By, (c) = Cosigned By    Initials Name Provider Type    Vianey Lyons, PT Physical Therapist               Clinical Impression     Row Name 08/08/22 0945          Pain    Additional Documentation Pain Scale: FACES Pre/Post-Treatment (Group)  -CB     Row Name 08/08/22 0945          Pain Scale: FACES Pre/Post-Treatment    Pain: FACES Scale, Pretreatment 2-->hurts little bit  -CB     Posttreatment Pain Rating 6-->hurts even more  -CB     Pain Location - knee  -CB     Pre/Posttreatment Pain Comment L>R  -CB     Row Name 08/08/22 0945          Plan of Care Review    Plan of Care Reviewed With patient  -CB     Outcome Evaluation Patient is an 87 yo male who  presented after falling on both knees at home. MRI of L knee revealed severe OA, Baker's cyst, and degenerative oblique tear posterior horn of medial meniscus. He lives alone as his spouse has been at rehab/SNF and is ind at baseline without use of AD. He presents with incresaed knee pain, balance, deficits, decreased safety awareness, and overall functional decline from baseline. He was modAx2 for STS and minAx2 for bed>BSC>chair transfer using rwx today. He will continue to benefit from skilled PT to address functional deficits. PT rec SNF at [place].  -CB     Row Name 08/08/22 0945          Therapy Assessment/Plan (PT)    Rehab Potential (PT) good, to achieve stated therapy goals  -CB     Criteria for Skilled Interventions Met (PT) yes  -CB     Therapy Frequency (PT) 5 times/wk  -CB     Row Name 08/08/22 0945          Positioning and Restraints    Pre-Treatment Position in bed  -CB     Post Treatment Position chair  -CB     In Chair notified nsg;reclined;call light within reach;encouraged to call for assist;exit alarm on;legs elevated  -CB           User Key  (r) = Recorded By, (t) = Taken By, (c) = Cosigned By    Initials Name Provider Type    CB Vianey Hager, PT Physical Therapist               Outcome Measures     Row Name 08/08/22 0950          How much help from another person do you currently need...    Turning from your back to your side while in flat bed without using bedrails? 3  -CB     Moving from lying on back to sitting on the side of a flat bed without bedrails? 3  -CB     Moving to and from a bed to a chair (including a wheelchair)? 3  -CB     Standing up from a chair using your arms (e.g., wheelchair, bedside chair)? 2  -CB     Climbing 3-5 steps with a railing? 1  -CB     To walk in hospital room? 1  -CB     AM-PAC 6 Clicks Score (PT) 13  -CB     Highest level of mobility 4 --> Transferred to chair/commode  -CB     Row Name 08/08/22 0950 08/08/22 0935       Functional Assessment    Outcome Measure  Options AM-PAC 6 Clicks Basic Mobility (PT)  - AM-PAC 6 Clicks Daily Activity (OT)  -          User Key  (r) = Recorded By, (t) = Taken By, (c) = Cosigned By    Initials Name Provider Type    SM Esther Valdez, OT Occupational Therapist    Vianey Lyons, PT Physical Therapist                             Physical Therapy Education                 Title: PT OT SLP Therapies (In Progress)     Topic: Physical Therapy (In Progress)     Point: Mobility training (Done)     Learning Progress Summary           Patient Acceptance, E,TB, VU,NR by  at 8/8/2022 0951                   Point: Home exercise program (Not Started)     Learner Progress:  Not documented in this visit.          Point: Body mechanics (Not Started)     Learner Progress:  Not documented in this visit.          Point: Precautions (Not Started)     Learner Progress:  Not documented in this visit.                      User Key     Initials Effective Dates Name Provider Type Discipline     10/22/21 -  Vianey Hager, PT Physical Therapist PT              PT Recommendation and Plan  Planned Therapy Interventions (PT): balance training, bed mobility training, gait training, home exercise program, patient/family education, strengthening, transfer training, ROM (range of motion)  Plan of Care Reviewed With: patient  Outcome Evaluation: Patient is an 89 yo male who presented after falling on both knees at home. MRI of L knee revealed severe OA, Baker's cyst, and degenerative oblique tear posterior horn of medial meniscus. He lives alone as his spouse has been at rehab/SNF and is ind at baseline without use of AD. He presents with incresaed knee pain, balance, deficits, decreased safety awareness, and overall functional decline from baseline. He was modAx2 for STS and minAx2 for bed>BSC>chair transfer using rwx today. He will continue to benefit from skilled PT to address functional deficits. PT rec SNF at [place].     Time Calculation:    PT Charges      Row Name 08/08/22 0952             Time Calculation    Start Time 0848  -CB      Stop Time 0907  -CB      Time Calculation (min) 19 min  -CB      PT Received On 08/08/22  -CB      PT - Next Appointment 08/09/22  -CB      PT Goal Re-Cert Due Date 08/15/22  -CB              Time Calculation- PT    Total Timed Code Minutes- PT 10 minute(s)  -CB              Timed Charges    66118 - PT Therapeutic Activity Minutes 10  -CB              Total Minutes    Timed Charges Total Minutes 10  -CB       Total Minutes 10  -CB            User Key  (r) = Recorded By, (t) = Taken By, (c) = Cosigned By    Initials Name Provider Type    CB Vianey Hager, PT Physical Therapist              Therapy Charges for Today     Code Description Service Date Service Provider Modifiers Qty    73136455084 HC PT THERAPEUTIC ACT EA 15 MIN 8/8/2022 Vianey Hager, PT GP 1    33308569157 HC PT EVAL MOD COMPLEXITY 3 8/8/2022 Vianey Hager, PT GP 1          PT G-Codes  Outcome Measure Options: AM-PAC 6 Clicks Basic Mobility (PT)  AM-PAC 6 Clicks Score (PT): 13  AM-PAC 6 Clicks Score (OT): 14    Vianey Hager PT  8/8/2022

## 2022-08-08 NOTE — PLAN OF CARE
Goal Outcome Evaluation:  Plan of Care Reviewed With: patient        Progress: no change  Outcome Evaluation: A&Ox4 VSS Unable to bare weight on LLE, norma knee swelling present R > L. C/o constipation, a little obsessive being that last BM was 8/4/22. Pt is only eating 1 full meal per day and not drinking enough water. Educated on importance of water and ambulation for BM. Pt requested milk of mag, one dose was given with no effect. Pt asked for bed pan and sat on it for an hour, he refused to let us remove the bed pan. Pt then asked for another dose and it was explained to pt that we would have to try other options. He requested an enema, but was unable to give d/t transport request in que for MRI. JEANIETM

## 2022-08-08 NOTE — PLAN OF CARE
Goal Outcome Evaluation:  Plan of Care Reviewed With: patient           Outcome Evaluation: Patient is an 89 yo male who presented after falling on both knees at home. MRI of L knee revealed severe OA, Baker's cyst, and degenerative oblique tear posterior horn of medial meniscus. He lives alone as his spouse has been at rehab/SNF and is ind at baseline without use of AD. He presents with incresaed knee pain, balance, deficits, decreased safety awareness, and overall functional decline from baseline. He was modAx2 for STS and minAx2 for bed>BSC>chair transfer using rwx today. He will continue to benefit from skilled PT to address functional deficits. PT rec SNF at [place].

## 2022-08-08 NOTE — PROGRESS NOTES
Name: Max Shah ADMIT: 2022   : 1934  PCP: Karli Alonzo APRN    MRN: [id number] LOS: 0 days   AGE/SEX: 88 y.o. male  ROOM: [facility]     Subjective   Subjective   Patient appears elderly, frail, generally weak, relatively comfortable, & in no apparent distress.  Tolerating diet.  Voices no new concerns.      Review of Systems   Constitutional: Negative for chills and fever.   Respiratory: Negative for cough and shortness of breath.    Cardiovascular: Negative for chest pain and leg swelling.   Gastrointestinal: Negative for abdominal pain, constipation, diarrhea, nausea and vomiting.   Genitourinary: Negative for difficulty urinating and dysuria.   Musculoskeletal: Positive for arthralgias (left knee) and gait problem (due to left knee pain). Negative for back pain.        Objective   Objective   Vital Signs  Temp:  [96.7 °F (35.9 °C)-97.9 °F (36.6 °C)] 97.2 °F (36.2 °C)  Heart Rate:  [71-94] 86  Resp:  [18] 18  BP: (128-180)/(74-87) 153/86  SpO2:  [90 %-95 %] 90 %  on   ;   Device (Oxygen Therapy): room air  Body mass index is 25.1 kg/m².     Physical Exam  Constitutional:       General: He is not in acute distress.     Appearance: He is not toxic-appearing.   Cardiovascular:      Rate and Rhythm: Normal rate.      Heart sounds: Normal heart sounds.   Pulmonary:      Effort: Pulmonary effort is normal.      Breath sounds: Normal breath sounds.   Abdominal:      General: Bowel sounds are normal.      Palpations: Abdomen is soft.   Musculoskeletal:         General: Tenderness (left knee) present.      Right lower leg: No edema.      Left lower leg: No edema.   Skin:     General: Skin is warm and dry.   Neurological:      Mental Status: He is alert.         Results Review     I reviewed the patient's new clinical results.  Results from last 7 days   Lab Units 22  0744 22  1111   WBC 10*3/mm3 11.27* 9.02   HEMOGLOBIN g/dL 14.1 13.2   PLATELETS 10*3/mm3 197 197     Results from last 7  days   Lab Units 08/07/22  0744 08/06/22  1111   SODIUM mmol/L 134* 139   POTASSIUM mmol/L 4.2 4.3   CHLORIDE mmol/L 101 103   CO2 mmol/L 22.0 26.7   BUN mg/dL 16 20   CREATININE mg/dL 0.84 1.10   GLUCOSE mg/dL 123* 112*   EGFR mL/min/1.73 83.9 64.6       Results from last 7 days   Lab Units 08/07/22  0744 08/06/22  1111   CALCIUM mg/dL 9.1 9.2       No results found for: HGBA1C, POCGLU    MRI Knee Left Without Contrast    Result Date: 8/8/2022  1. Osteoarthritis is severe at the patellofemoral compartment where there is full-thickness cartilage loss, remodeling of the articular surfaces, surrounding subchondral edema. 2. Degenerative oblique tear posterior horn medial meniscus. Medial meniscal body free edge degeneration with partial medial extrusion. Free edge degeneration lateral meniscal body. 3. Knee joint effusion with Baker's cyst. 6 mm osteochondral body within a segment of Baker's cyst anterior to the medial head gastrocnemius muscle.  This report was finalized on 8/8/2022 9:59 AM by Dr. Chepe Gomez M.D.      Scheduled Medications  aspirin, 81 mg, Oral, Daily  atorvastatin, 10 mg, Oral, Daily  enoxaparin, 40 mg, Subcutaneous, Nightly  lidocaine PF 1%, 5 mL, Injection, Once  methylPREDNISolone acetate, 80 mg, Intra-articular, Once  senna-docusate sodium, 2 tablet, Oral, BID  vitamin B-12, 1,000 mcg, Oral, Daily    Infusions   Diet  Diet Regular       Assessment/Plan     Active Hospital Problems    Diagnosis  POA   • **Left knee injury [S89.92XA]  Yes   • Fall [W19.XXXA]  Yes   • Right bundle branch block [I45.10]  Yes   • Obstructive sleep apnea [G47.33]  Yes   • Stenosis of carotid artery [I65.29]  Yes   • Hypertension [I10]  Yes   • CAD (coronary artery disease) [I25.10]  Yes      Resolved Hospital Problems   No resolved problems to display.       88 y.o. male admitted with Left knee injury.      · Left knee injury-MRI reviewed. Ortho plan injection at bedside for advanced osteoarthritis with a  degenerative meniscal tear. Pain control. Bowel regimen. PT & OT following--recommend SNF  · Hypertension-apparently not taking any BP medications at home anymore. Adequately controlled acutely  · Coronary artery disease-aspirin/statin. Normal stress test last month.  · Carotid artery stenosis s/p intervention in 2001. 60-70% bilateral stenosis on most recently ultrasound  · MARKO-continue cpap if available  · Chronic RBBB  · Constipation-no BM recorded.  MOM ordered.  Pericolace.  Ordering dulcolax supp once.        · Enoxaparin for DVT prophylaxis.  · CPR  · Discussed with RN & patient.   · Anticipate discharge pending left knee pain tolerance / ? SNF -- CCP following    DOUGLAS Sherman  [facility]  08/08/22  16:39 EDT

## 2022-08-08 NOTE — PLAN OF CARE
Goal Outcome Evaluation:  Plan of Care Reviewed With: patient        Progress: improving  Outcome Evaluation: Very [place].  A&Ox4 but intermittent confusion noted this evening after melatonin given to aide with sleep.  Tylenol x 1 for knee pain after MRI.  Educated patient of importance of staying hydrate for aide with constipation.  Pt want to wait until this morning to see if he has BM before attempting tap water enema.  Improvement noted with BLE movement.  Patient able to turn in bed.  Bed alarm

## 2022-08-08 NOTE — PLAN OF CARE
Goal Outcome Evaluation:  Plan of Care Reviewed With: patient           Outcome Evaluation: Pt is a 88 y.o male admitted to [facility] after falling on both knees at home. 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 Pt able is typically independent with ADL and all mobility without AD. Today pt requires X2 person assist to pivot to BSC and recliner. Total A for LBD and toileting today 2/2 decreased LE strength, ROM, and pain. Pt would benefit from continued OT to address deficits with ADLs and transfers. He is not safe to dc home at this time and will need SNF at [place].    OT wore appropriate PPE and completed hand hygiene.

## 2022-08-08 NOTE — PROGRESS NOTES
I have reviewed his MRI results.  It appears that he has advanced osteoarthritis with a degenerative meniscal tear.  There is subchondral marrow edema which likely accounts for his increased pain in the recent injury.  There does not appear to be any other acute abnormality.  I have ordered injection supplies to the bedside.    Gordon Kay MD

## 2022-08-08 NOTE — DISCHARGE PLACEMENT REQUEST
"Max Hart ESE (88 y.o. Male)             Date of Birth   1934    Social Security Number       Address   [address] [address]    Home Phone   [phone]    MRN   [id number]       Jewish   Rastafari    Marital Status                               Admission Date   8/6/22    Admission Type   Emergency    Admitting Provider   Beto Alvarado MD    Attending Provider   Beto Alvarado MD    Department, Room/Bed   [address], P397/1       Discharge Date       Discharge Disposition       Discharge Destination                               Attending Provider: Beto Alvarado MD    Allergies: No Known Allergies    Isolation: None   Infection: None   Code Status: CPR   Advance Care Planning Activity    Ht: 180.3 cm (71\")   Wt: 81.6 kg (180 lb)    Admission Cmt: None   Principal Problem: Left knee injury [S89.92XA]                 Active Insurance as of 8/6/2022     Primary Coverage     Payor Plan Insurance Group Employer/Plan Group    MEDICARE MEDICARE A & B      Payor Plan Address Payor Plan Phone Number Payor Plan Fax Number Effective Dates    [address] [phone]  2/1/1999 - None Entered    [place] [address]       Subscriber Name Subscriber Birth Date Member ID       MAX HART 1934 [id number]                 Emergency Contacts      (Rel.) Home Phone Work Phone Mobile Phone    Pooja Hart (Spouse) [phone] -- [phone]              "

## 2022-08-08 NOTE — PROGRESS NOTES
Discharge Planning Assessment  [facility]     Patient Name: Max Shah  MRN: [id number]  Today's Date: 8/8/2022    Admit Date: 8/6/2022     Discharge Needs Assessment     Row Name 08/08/22 1410       Living Environment    People in Home spouse    Name(s) of People in Home Flavia Caraballo, [phone]    Unique Family Situation Wife is currently in rehab at [facility]    Current Living Arrangements home    Primary Care Provided by self    Provides Primary Care For no one    Family Caregiver if Needed spouse    Quality of Family Relationships helpful;involved;supportive    Able to Return to Prior Arrangements yes       Resource/Environmental Concerns    Resource/Environmental Concerns none       Transition Planning    Patient/Family Anticipates Transition to inpatient rehabilitation facility    Patient/Family Anticipated Services at Transition skilled nursing;rehabilitation services    Transportation Anticipated family or friend will provide;agency       Discharge Needs Assessment    Equipment Currently Used at Home none;cane, straight;walker, rolling    Concerns to be Addressed discharge planning    Outpatient/Agency/Support Group Needs skilled nursing facility    Discharge Facility/Level of Care Needs nursing facility, skilled    Provided Post Acute Provider List? Yes    Post Acute Provider List Nursing Home    Discharge Coordination/Progress SNF               Discharge Plan     Row Name 08/08/22 1415       Plan    Plan SNF referral pending    Patient/Family in Agreement with Plan yes    Plan Comments CCP met with pt at bedside to discuss d/c planning. Pt resides with his wife (who is currently in rehab at [facility]) in a single level home, uses no DME typically but has cane/walker on hand if ever needed, and has no h/o HH/SNF. Pt has been to [facility] for outpatient PT in the past. Pt agreeable to SNF referral with preference for [facility] following pending bed availability. Pharmacy updated  and packet in CCP office. Stefania Brar LCSW              Continued Care and Services - Admitted Since 8/6/2022     Destination     Service Provider Request Status Selected Services Address Phone Fax Patient Preferred    [facility]  Pending - Request Sent N/A [address] [facility] [address] [phone] --              Expected Discharge Date and Time     Expected Discharge Date Expected Discharge Time    Aug 11, 2022          Demographic Summary     Row Name 08/08/22 1409       General Information    Admission Type observation    Arrived From home    Required Notices Provided Observation Status Notice    Referral Source admission list    Reason for Consult discharge planning    Preferred Language English               Functional Status     Row Name 08/08/22 1410       Functional Status    Usual Activity Tolerance good    Current Activity Tolerance fair       Functional Status, IADL    Medications independent    Meal Preparation independent    Housekeeping independent    Laundry independent    Shopping independent               Psychosocial    No documentation.                Abuse/Neglect    No documentation.                Legal    No documentation.                Substance Abuse    No documentation.                Patient Forms    No documentation.                   nAh Brar LCSW

## 2022-08-08 NOTE — PLAN OF CARE
Goal Outcome Evaluation:           Progress: improving  Outcome Evaluation: Patient alert and oriented, mild pain in left knee, awaiting ortho to see patient see MD note for updated plan,  no plan for surgery, plan to discharge to SNF, vitals stable

## 2022-08-08 NOTE — PROGRESS NOTES
"Knee Injection Procedure Note    Knee injection was discussed with the patient in detail, including indication, risks, benefits, and alternatives. Verbal consent was given for the procedure.  Injection site was identified by physical examination and cleaned with Betadine and alcohol swabs. Sterile technique was used.  A 20-gauge, 1.5\" needle was directed to the joint from an anterolateral approach. Injectate was passed into the joint space without difficulty. The needle was removed and a simple bandage was applied. The procedure was tolerated well without difficulty.    Injection mixture:  1% lidocaine without epinephrine: 2 mL  Depomedrol: 1ml / 80mg     Corazon Larkin, DOUGLAS    08/08/2022    "

## 2022-08-09 ENCOUNTER — APPOINTMENT (OUTPATIENT)
Dept: GENERAL RADIOLOGY | Facility: HOSPITAL | Age: 87
End: 2022-08-09

## 2022-08-09 ENCOUNTER — APPOINTMENT (OUTPATIENT)
Dept: CT IMAGING | Facility: HOSPITAL | Age: 87
End: 2022-08-09

## 2022-08-09 PROBLEM — M25.562 ACUTE PAIN OF LEFT KNEE: Status: ACTIVE | Noted: 2022-08-09

## 2022-08-09 PROBLEM — R33.8 ACUTE URINARY RETENTION: Status: ACTIVE | Noted: 2022-08-09

## 2022-08-09 LAB
ANION GAP SERPL CALCULATED.3IONS-SCNC: 11 MMOL/L (ref 5–15)
BUN SERPL-MCNC: 22 MG/DL (ref 8–23)
BUN/CREAT SERPL: 22 (ref 7–25)
CALCIUM SPEC-SCNC: 9.4 MG/DL (ref 8.6–10.5)
CHLORIDE SERPL-SCNC: 100 MMOL/L (ref 98–107)
CO2 SERPL-SCNC: 24 MMOL/L (ref 22–29)
CREAT SERPL-MCNC: 1 MG/DL (ref 0.76–1.27)
DEPRECATED RDW RBC AUTO: 42.3 FL (ref 37–54)
EGFRCR SERPLBLD CKD-EPI 2021: 72.4 ML/MIN/1.73
ERYTHROCYTE [DISTWIDTH] IN BLOOD BY AUTOMATED COUNT: 12.7 % (ref 12.3–15.4)
GLUCOSE SERPL-MCNC: 113 MG/DL (ref 65–99)
HCT VFR BLD AUTO: 42.9 % (ref 37.5–51)
HGB BLD-MCNC: 14.3 G/DL (ref 13–17.7)
MCH RBC QN AUTO: 30.4 PG (ref 26.6–33)
MCHC RBC AUTO-ENTMCNC: 33.3 G/DL (ref 31.5–35.7)
MCV RBC AUTO: 91.3 FL (ref 79–97)
PLATELET # BLD AUTO: 237 10*3/MM3 (ref 140–450)
PMV BLD AUTO: 9.9 FL (ref 6–12)
POTASSIUM SERPL-SCNC: 4.2 MMOL/L (ref 3.5–5.2)
RBC # BLD AUTO: 4.7 10*6/MM3 (ref 4.14–5.8)
SODIUM SERPL-SCNC: 135 MMOL/L (ref 136–145)
WBC NRBC COR # BLD: 12.89 10*3/MM3 (ref 3.4–10.8)

## 2022-08-09 PROCEDURE — 25010000002 ENOXAPARIN PER 10 MG: Performed by: STUDENT IN AN ORGANIZED HEALTH CARE EDUCATION/TRAINING PROGRAM

## 2022-08-09 PROCEDURE — 74177 CT ABD & PELVIS W/CONTRAST: CPT

## 2022-08-09 PROCEDURE — 85027 COMPLETE CBC AUTOMATED: CPT | Performed by: NURSE PRACTITIONER

## 2022-08-09 PROCEDURE — 25010000002 IOPAMIDOL 61 % SOLUTION: Performed by: STUDENT IN AN ORGANIZED HEALTH CARE EDUCATION/TRAINING PROGRAM

## 2022-08-09 PROCEDURE — 97530 THERAPEUTIC ACTIVITIES: CPT

## 2022-08-09 PROCEDURE — 80048 BASIC METABOLIC PNL TOTAL CA: CPT | Performed by: NURSE PRACTITIONER

## 2022-08-09 PROCEDURE — 74018 RADEX ABDOMEN 1 VIEW: CPT

## 2022-08-09 RX ORDER — TAMSULOSIN HYDROCHLORIDE 0.4 MG/1
0.4 CAPSULE ORAL DAILY
Status: DISCONTINUED | OUTPATIENT
Start: 2022-08-09 | End: 2022-08-11 | Stop reason: HOSPADM

## 2022-08-09 RX ORDER — BISACODYL 10 MG
10 SUPPOSITORY, RECTAL RECTAL DAILY
Status: DISCONTINUED | OUTPATIENT
Start: 2022-08-09 | End: 2022-08-11 | Stop reason: HOSPADM

## 2022-08-09 RX ADMIN — ASPIRIN 81 MG: 81 TABLET, FILM COATED ORAL at 08:49

## 2022-08-09 RX ADMIN — IOPAMIDOL 85 ML: 612 INJECTION, SOLUTION INTRAVENOUS at 15:13

## 2022-08-09 RX ADMIN — Medication 1000 MCG: at 08:50

## 2022-08-09 RX ADMIN — BISACODYL 10 MG: 10 SUPPOSITORY RECTAL at 11:28

## 2022-08-09 RX ADMIN — ENOXAPARIN SODIUM 40 MG: 100 INJECTION SUBCUTANEOUS at 20:34

## 2022-08-09 RX ADMIN — ACETAMINOPHEN 650 MG: 325 TABLET, FILM COATED ORAL at 13:22

## 2022-08-09 RX ADMIN — ATORVASTATIN CALCIUM 10 MG: 20 TABLET, FILM COATED ORAL at 08:48

## 2022-08-09 RX ADMIN — DOCUSATE SODIUM 50MG AND SENNOSIDES 8.6MG 2 TABLET: 8.6; 5 TABLET, FILM COATED ORAL at 08:48

## 2022-08-09 RX ADMIN — TAMSULOSIN HYDROCHLORIDE 0.4 MG: 0.4 CAPSULE ORAL at 17:18

## 2022-08-09 NOTE — PROGRESS NOTES
Name: Max Shah ADMIT: 2022   : 1934  PCP: Karli Alonzo APRN    MRN: [id number] LOS: 0 days   AGE/SEX: 88 y.o. male  ROOM: [facility]     Subjective   Subjective   Patient seen at 0920 on 2022--appears elderly, frail, generally weak, relatively comfortable, & in no apparent distress.  Tolerating diet.  Complains of abdominal pain & swelling.  Discussed with RN.     Review of Systems   Constitutional: Negative for chills and fever.   Respiratory: Negative for cough and shortness of breath.    Cardiovascular: Negative for chest pain and leg swelling.   Gastrointestinal: Positive for abdominal distention and abdominal pain. Negative for constipation, diarrhea, nausea and vomiting.   Genitourinary: Positive for difficulty urinating. Negative for dysuria.   Musculoskeletal: Positive for arthralgias (left knee) and gait problem (due to left knee pain). Negative for back pain.        Objective   Objective   Vital Signs  Temp:  [97.2 °F (36.2 °C)-98.4 °F (36.9 °C)] 97.2 °F (36.2 °C)  Heart Rate:  [74-86] 84  Resp:  [18] 18  BP: (138-168)/(83-94) 162/94  SpO2:  [90 %-94 %] 92 %  on   ;   Device (Oxygen Therapy): room air  Body mass index is 25.1 kg/m².     Physical Exam  Constitutional:       General: He is not in acute distress.     Appearance: He is not toxic-appearing.   Cardiovascular:      Rate and Rhythm: Normal rate.      Heart sounds: Normal heart sounds.   Pulmonary:      Effort: Pulmonary effort is normal.      Breath sounds: Normal breath sounds.   Abdominal:      General: There is distension.      Palpations: Abdomen is soft.      Tenderness: There is abdominal tenderness.   Musculoskeletal:         General: Tenderness (left knee) present.      Right lower leg: No edema.      Left lower leg: No edema.   Skin:     General: Skin is warm and dry.   Neurological:      Mental Status: He is alert.         Results Review     I reviewed the patient's new clinical results.  Results from last 7 days    Lab Units 08/09/22  0911 08/07/22  0744 08/06/22  1111   WBC 10*3/mm3 12.89* 11.27* 9.02   HEMOGLOBIN g/dL 14.3 14.1 13.2   PLATELETS 10*3/mm3 237 197 197     Results from last 7 days   Lab Units 08/09/22  0911 08/07/22  0744 08/06/22  1111   SODIUM mmol/L 135* 134* 139   POTASSIUM mmol/L 4.2 4.2 4.3   CHLORIDE mmol/L 100 101 103   CO2 mmol/L 24.0 22.0 26.7   BUN mg/dL 22 16 20   CREATININE mg/dL 1.00 0.84 1.10   GLUCOSE mg/dL 113* 123* 112*   EGFR mL/min/1.73 72.4 83.9 64.6       Results from last 7 days   Lab Units 08/09/22  0911 08/07/22  0744 08/06/22  1111   CALCIUM mg/dL 9.4 9.1 9.2       No results found for: HGBA1C, POCGLU    MRI Knee Left Without Contrast    Result Date: 8/8/2022  1. Osteoarthritis is severe at the patellofemoral compartment where there is full-thickness cartilage loss, remodeling of the articular surfaces, surrounding subchondral edema. 2. Degenerative oblique tear posterior horn medial meniscus. Medial meniscal body free edge degeneration with partial medial extrusion. Free edge degeneration lateral meniscal body. 3. Knee joint effusion with Baker's cyst. 6 mm osteochondral body within a segment of Baker's cyst anterior to the medial head gastrocnemius muscle.  This report was finalized on 8/8/2022 9:59 AM by Dr. Chepe Gomez M.D.      XR Abdomen KUB    Result Date: 8/9/2022  Large masslike opacity projecting over the lower abdomen and pelvis. This could reflect a pelvic mass or could be a severely distended bladder. Suggest correlation with CT or bladder scan  This report was finalized on 8/9/2022 11:52 AM by Dr. Leonid Bettencourt M.D.      Scheduled Medications  aspirin, 81 mg, Oral, Daily  atorvastatin, 10 mg, Oral, Daily  bisacodyl, 10 mg, Rectal, Once  bisacodyl, 10 mg, Rectal, Daily  enoxaparin, 40 mg, Subcutaneous, Nightly  senna-docusate sodium, 2 tablet, Oral, BID  tamsulosin, 0.4 mg, Oral, Daily  vitamin B-12, 1,000 mcg, Oral, Daily    Infusions   Diet  Diet Regular        Assessment/Plan     Active Hospital Problems    Diagnosis  POA   • **Left knee injury [S89.92XA]  Yes   • Acute urinary retention [R33.8]  Clinically Undetermined   • Fall [W19.XXXA]  Yes   • Right bundle branch block [I45.10]  Yes   • Obstructive sleep apnea [G47.33]  Yes   • Stenosis of carotid artery [I65.29]  Yes   • Hypertension [I10]  Yes   • CAD (coronary artery disease) [I25.10]  Yes      Resolved Hospital Problems   No resolved problems to display.       88 y.o. male admitted with Left knee injury.      · Left knee injury-MRI reviewed. Ortho plan injection at bedside for advanced osteoarthritis with a degenerative meniscal tear. Pain control. Bowel regimen. PT & OT following--recommend SNF.  · Abdominal distention / Acute urinary retention-XR abd large mass.  Greater than 3000 mL removed with straight cath.  Ordering flomax & CT A/P with contrast.  UA/C&S.  Consider urology consult if unable to void independently.   · Hypertension-apparently not taking any BP medications at home anymore. BP greater than optimal--most likely due to acute urinary retention.  Plan to relieve bladder & monitor BP for changes.   · Coronary artery disease-aspirin/statin. Normal stress test last month.  · Carotid artery stenosis s/p intervention in 2001. 60-70% bilateral stenosis on most recent ultrasound  · MARKO-continue cpap if available  · Chronic RBBB  · Constipation-resolved.  Continue current MOM PRN, Pericolace, dulcolax supp PRN.        · Enoxaparin for DVT prophylaxis.  · CPR  · Discussed with RN & patient.   · Anticipate discharge pending left knee pain tolerance & voiding independently / SNF -- CCP following    DOUGLAS Sherman  [facility]  08/09/22  15:26 EDT

## 2022-08-09 NOTE — PLAN OF CARE
Goal Outcome Evaluation:  Plan of Care Reviewed With: patient        Progress: improving  Outcome Evaluation: VSS.  A&Ox4.  Chitimacha.  Tylenol & melatonin at bedtime.  Slept throughout the night.  Encourage taking stool softener and increase liquid intake daily to reduce r/o constipation.  Waiting for placement.

## 2022-08-09 NOTE — PLAN OF CARE
Goal Outcome Evaluation:  Plan of Care Reviewed With: patient        Progress: improving  Outcome Evaluation: Pt has injection yesterday for knee(s), he reports no pain this date and is performing functional mobility and transfers with less assist. Pt required X2 person assist yesterday and now able to stand min AX1 and CGA to take a few steps from bed to transport bed. Limited session able to be performed this date as pt leaving the floor for CT. He would continue to benefit from SNF as he is still below baseline for ADLs and mobility. He lives alone.    OT wore appropriate PPE and completed hand hygiene.

## 2022-08-09 NOTE — PROGRESS NOTES
Continued Stay Note  [facility]     Patient Name: Max Shah  MRN: [id number]  Today's Date: 8/9/2022    Admit Date: 8/6/2022     Discharge Plan     Row Name 08/09/22 1204       Plan    Plan SNF referrals pending    Patient/Family in Agreement with Plan yes    Plan Comments Per Delilah, no bed available at [facility], but [facility] [place] [facility] [place] pending bed availability when pt medically stable for d/c. CCP updated pt who is agreeable and states he will need wheelchair van transport at d/c. Pharmacy updated. Packet in CCP office. Stefania Brar LCSW               Discharge Codes    No documentation.               Expected Discharge Date and Time     Expected Discharge Date Expected Discharge Time    Aug 11, 2022             Anh Brar LCSW

## 2022-08-09 NOTE — PLAN OF CARE
Goal Outcome Evaluation:           Progress: improving  Outcome Evaluation: Patient alert and oriented, mild pain in left knee, no plan for surgery, plan to discharge to SNF, vitals stable

## 2022-08-09 NOTE — THERAPY TREATMENT NOTE
Patient Name: Max Shah  : 1934    MRN: [id number]                              Today's Date: 2022       Admit Date: 2022    Visit Dx:     ICD-10-CM ICD-9-CM   1. Acute pain of left knee  M25.562 719.46   2. Fall, initial encounter  W19.XXXA E888.9   3. Unable to ambulate  R26.2 719.7     Patient Active Problem List   Diagnosis   • Hypertension   • CAD (coronary artery disease)   • GERD (gastroesophageal reflux disease)   • Chronic fatigue   • Hand pain, right   • Stenosis of carotid artery   • Diastolic dysfunction   • Hyperlipidemia   • Pulmonary hypertension (HCC)   • Obstructive sleep apnea   • Lightheadedness   • Paresthesia of both feet   • Elevated glucose   • Leg weakness, bilateral   • Balance disorder   • Fall   • Left knee injury   • Right bundle branch block     Past Medical History:   Diagnosis Date   • Arthritis    • CAD (coronary artery disease)    • Cataracts, bilateral    • CPAP (continuous positive airway pressure) dependence    • Diverticulitis    • Fatigue    • GERD (gastroesophageal reflux disease)    • Hyperlipidemia    • Hypertension    • Knee pain    • PAOD (peripheral arterial occlusive disease) ([facility])    • Shortness of breath    • Sleep apnea    • Varicose veins      Past Surgical History:   Procedure Laterality Date   • ANGIOPLASTY      Cerebral   • CARDIAC CATHETERIZATION     • CAROTID ARTERY ANGIOPLASTY  2001   • CATARACT EXTRACTION W/ INTRAOCULAR LENS IMPLANT Left 2017    Procedure: LT CATARACT PHACO EXTRACTION WITH INTRAOCULAR LENS IMPLANT;  Surgeon: Syed Tang MD;  Location: [facility];  Service:    • CATARACT EXTRACTION W/ INTRAOCULAR LENS IMPLANT Right 2017    Procedure: RT CATARACT PHACO EXTRACTION WITH INTRAOCULAR LENS IMPLANT;  Surgeon: Syed Tang MD;  Location: [facility];  Service:    • THROMBOENDARTERECTOMY Right       General Information     Row Name 22 1503          OT Time and Intention     Document Type therapy note (daily note)  -     Mode of Treatment occupational therapy;individual therapy  -     Row Name 08/09/22 1503          General Information    Patient Profile Reviewed yes  -     Existing Precautions/Restrictions fall  -     Row Name 08/09/22 1503          Cognition    Orientation Status (Cognition) oriented x 3  -[facility] Name 08/09/22 1503          Safety Issues, Functional Mobility    Safety Issues Affecting Function (Mobility) safety precaution awareness;safety precautions follow-through/compliance;insight into deficits/self-awareness  -     Impairments Affecting Function (Mobility) balance;endurance/activity tolerance;strength  -           User Key  (r) = Recorded By, (t) = Taken By, (c) = Cosigned By    Initials Name Provider Type     Esther Valdez OT Occupational Therapist                 Mobility/ADL's     Row Name 08/09/22 1504          Bed Mobility    Bed Mobility sit-supine  -     Supine-Sit [place] (Bed Mobility) contact guard;verbal cues  -     Sit-Supine [place] (Bed Mobility) verbal cues;contact guard  -     Assistive Device (Bed Mobility) head of bed elevated;bed rails  -     Row Name 08/09/22 1504          Transfers    Bed-Chair [place] (Transfers) minimum assist (75% patient effort)  -     Assistive Device (Bed-Chair Transfers) walker, front-wheeled  -     Row Name 08/09/22 1504          Functional Mobility    Functional Mobility- Ind. Level contact guard assist  -     Functional Mobility- Device walker, front-wheeled  -     Functional Mobility-Distance (Feet) --  5  -[facility] Name 08/09/22 1504          Lower Body Dressing Assessment/Training    [place] Level (Lower Body Dressing) lower body dressing skills;don;socks;dependent (less than 25% patient effort)  -     Position (Lower Body Dressing) edge of bed sitting  -     Comment, (Lower Body Dressing) pt with better ROM this date to reach to feet but still not  able to perform socks.  -     Row Name 08/09/22 1504          Toileting Assessment/Training    Comment, (Toileting) RN in room at start of session, just finished straight cath pt. He is able to wipe self in supine position.  -           User Key  (r) = Recorded By, (t) = Taken By, (c) = Cosigned By    Initials Name Provider Type    Esther Krishnan OT Occupational Therapist               Obj/Interventions     Row Name 08/09/22 1505          Balance    Static Sitting Balance standby assist  -     Position, Sitting Balance sitting edge of bed  -     Static Standing Balance contact guard  -     Dynamic Standing Balance contact guard  -     Position/Device Used, Standing Balance supported  -           User Key  (r) = Recorded By, (t) = Taken By, (c) = Cosigned By    Initials Name Provider Type     Esther Valdez OT Occupational Therapist               Goals/Plan    No documentation.                Clinical Impression     Row Name 08/09/22 1506          Pain Assessment    Pretreatment Pain Rating 0/10 - no pain  -     Posttreatment Pain Rating 0/10 - no pain  -     Row Name 08/09/22 1506          Plan of Care Review    Plan of Care Reviewed With patient  -     Progress improving  -     Outcome Evaluation Pt has injection yesterday for knee(s), he reports no pain this date and is performing functional mobility and transfers with less assist. Pt required X2 person assist yesterday and now able to stand min AX1 and CGA to take a few steps from bed to transport bed. Limited session able to be performed this date as pt leaving the floor for CT. He would continue to benefit from SNF as he is still below baseline for ADLs and mobility. He lives alone.  -     Row Name 08/09/22 1506          Therapy Plan Review/Discharge Plan (OT)    Anticipated Discharge Disposition (OT) skilled nursing facility  -     Row Name 08/09/22 1508          Positioning and Restraints    Pre-Treatment Position in bed   -SM     Post Treatment Position other  -SM     Other Position with other staff  -           User Key  (r) = Recorded By, (t) = Taken By, (c) = Cosigned By    Initials Name Provider Type    Esther Krishnan OT Occupational Therapist               Outcome Measures     Row Name 08/09/22 1508          How much help from another is currently needed...    Putting on and taking off regular lower body clothing? 2  -SM     Bathing (including washing, rinsing, and drying) 2  -SM     Toileting (which includes using toilet bed pan or urinal) 2  -SM     Putting on and taking off regular upper body clothing 3  -SM     Taking care of personal grooming (such as brushing teeth) 3  -SM     Eating meals 4  -SM     AM-PAC 6 Clicks Score (OT) 16  -SM     Row Name 08/09/22 1508          Functional Assessment    Outcome Measure Options AM-PAC 6 Clicks Daily Activity (OT)  -           User Key  (r) = Recorded By, (t) = Taken By, (c) = Cosigned By    Initials Name Provider Type    Esther Krishnan OT Occupational Therapist                Occupational Therapy Education                 Title: PT OT SLP Therapies (In Progress)     Topic: Occupational Therapy (In Progress)     Point: ADL training (Done)     Description:   Instruct learner(s) on proper safety adaptation and remediation techniques during self care or transfers.   Instruct in proper use of assistive devices.              Learning Progress Summary           Patient Acceptance, E, VU by  at 8/8/2022 0978    Comment: OT goals, role of OT, dc recommendations. Falls precautions and use of call light for assist.                   Point: Home exercise program (Not Started)     Description:   Instruct learner(s) on appropriate technique for monitoring, assisting and/or progressing therapeutic exercises/activities.              Learner Progress:  Not documented in this visit.          Point: Precautions (Not Started)     Description:   Instruct learner(s) on prescribed  precautions during self-care and functional transfers.              Learner Progress:  Not documented in this visit.          Point: Body mechanics (Not Started)     Description:   Instruct learner(s) on proper positioning and spine alignment during self-care, functional mobility activities and/or exercises.              Learner Progress:  Not documented in this visit.                      User Key     Initials Effective Dates Name Provider Type Discipline     04/02/20 -  Esther Valdez OT Occupational Therapist OT              OT Recommendation and Plan  Planned Therapy Interventions (OT): activity tolerance training, adaptive equipment training, BADL retraining, functional balance retraining, occupation/activity based interventions, patient/caregiver education/training, transfer/mobility retraining  Therapy Frequency (OT): 5 times/wk  Plan of Care Review  Plan of Care Reviewed With: patient  Progress: improving  Outcome Evaluation: Pt has injection yesterday for knee(s), he reports no pain this date and is performing functional mobility and transfers with less assist. Pt required X2 person assist yesterday and now able to stand min AX1 and CGA to take a few steps from bed to transport bed. Limited session able to be performed this date as pt leaving the floor for CT. He would continue to benefit from SNF as he is still below baseline for ADLs and mobility. He lives alone.     Time Calculation:    Time Calculation- OT     Row Name 08/09/22 1509             Time Calculation- OT    OT Start Time 1449  -      OT Stop Time 1458  -      OT Time Calculation (min) 9 min  -SM      Total Timed Code Minutes- OT 9 minute(s)  -SM      OT Received On 08/09/22  -      OT - Next Appointment 08/10/22  -              Timed Charges    70700 - OT Therapeutic Activity Minutes 9  -SM              Total Minutes    Timed Charges Total Minutes 9  -SM       Total Minutes 9  -SM            User Key  (r) = Recorded By, (t) =  Taken By, (c) = Cosigned By    Initials Name Provider Type    Esther Krishnan OT Occupational Therapist              Therapy Charges for Today     Code Description Service Date Service Provider Modifiers Qty    47538943712 HC OT SELF CARE/MGMT/TRAIN EA 15 MIN 8/8/2022 Esther Valdez OT GO 1    30787524902 HC OT EVAL MOD COMPLEXITY 2 8/8/2022 Esther Valdez OT GO 1    [id number]  OT THERAPEUTIC ACT EA 15 MIN 8/9/2022 Esther Valdez OT GO 1               Esther Valdez OT  8/9/2022

## 2022-08-10 PROBLEM — I71.40 ABDOMINAL AORTIC ANEURYSM (AAA) WITHOUT RUPTURE (HCC): Status: ACTIVE | Noted: 2022-08-10

## 2022-08-10 PROBLEM — N39.0 ACUTE UTI (URINARY TRACT INFECTION): Status: ACTIVE | Noted: 2022-08-10

## 2022-08-10 LAB
ANION GAP SERPL CALCULATED.3IONS-SCNC: 9.7 MMOL/L (ref 5–15)
BACTERIA UR QL AUTO: ABNORMAL /HPF
BILIRUB UR QL STRIP: NEGATIVE
BUN SERPL-MCNC: 19 MG/DL (ref 8–23)
BUN/CREAT SERPL: 23.5 (ref 7–25)
CALCIUM SPEC-SCNC: 8.8 MG/DL (ref 8.6–10.5)
CHLORIDE SERPL-SCNC: 101 MMOL/L (ref 98–107)
CLARITY UR: ABNORMAL
CO2 SERPL-SCNC: 25.3 MMOL/L (ref 22–29)
COLOR UR: YELLOW
CREAT SERPL-MCNC: 0.81 MG/DL (ref 0.76–1.27)
DEPRECATED RDW RBC AUTO: 41.3 FL (ref 37–54)
EGFRCR SERPLBLD CKD-EPI 2021: 84.8 ML/MIN/1.73
ERYTHROCYTE [DISTWIDTH] IN BLOOD BY AUTOMATED COUNT: 12.6 % (ref 12.3–15.4)
GLUCOSE SERPL-MCNC: 136 MG/DL (ref 65–99)
GLUCOSE UR STRIP-MCNC: NEGATIVE MG/DL
HCT VFR BLD AUTO: 36.5 % (ref 37.5–51)
HGB BLD-MCNC: 12 G/DL (ref 13–17.7)
HGB UR QL STRIP.AUTO: ABNORMAL
HYALINE CASTS UR QL AUTO: ABNORMAL /LPF
KETONES UR QL STRIP: NEGATIVE
LEUKOCYTE ESTERASE UR QL STRIP.AUTO: ABNORMAL
MCH RBC QN AUTO: 29.6 PG (ref 26.6–33)
MCHC RBC AUTO-ENTMCNC: 32.9 G/DL (ref 31.5–35.7)
MCV RBC AUTO: 90.1 FL (ref 79–97)
NITRITE UR QL STRIP: NEGATIVE
PH UR STRIP.AUTO: <=5 [PH] (ref 5–8)
PLATELET # BLD AUTO: 201 10*3/MM3 (ref 140–450)
PMV BLD AUTO: 9.3 FL (ref 6–12)
POTASSIUM SERPL-SCNC: 3.7 MMOL/L (ref 3.5–5.2)
PROT UR QL STRIP: ABNORMAL
RBC # BLD AUTO: 4.05 10*6/MM3 (ref 4.14–5.8)
RBC # UR STRIP: ABNORMAL /HPF
REF LAB TEST METHOD: ABNORMAL
SODIUM SERPL-SCNC: 136 MMOL/L (ref 136–145)
SP GR UR STRIP: >=1.03 (ref 1–1.03)
SQUAMOUS #/AREA URNS HPF: ABNORMAL /HPF
UROBILINOGEN UR QL STRIP: ABNORMAL
WBC # UR STRIP: ABNORMAL /HPF
WBC NRBC COR # BLD: 8.49 10*3/MM3 (ref 3.4–10.8)

## 2022-08-10 PROCEDURE — 85027 COMPLETE CBC AUTOMATED: CPT | Performed by: NURSE PRACTITIONER

## 2022-08-10 PROCEDURE — 87086 URINE CULTURE/COLONY COUNT: CPT | Performed by: NURSE PRACTITIONER

## 2022-08-10 PROCEDURE — 25010000002 ENOXAPARIN PER 10 MG: Performed by: STUDENT IN AN ORGANIZED HEALTH CARE EDUCATION/TRAINING PROGRAM

## 2022-08-10 PROCEDURE — 81001 URINALYSIS AUTO W/SCOPE: CPT | Performed by: NURSE PRACTITIONER

## 2022-08-10 PROCEDURE — 25010000002 CEFTRIAXONE PER 250 MG: Performed by: NURSE PRACTITIONER

## 2022-08-10 PROCEDURE — 97530 THERAPEUTIC ACTIVITIES: CPT

## 2022-08-10 PROCEDURE — 80048 BASIC METABOLIC PNL TOTAL CA: CPT | Performed by: NURSE PRACTITIONER

## 2022-08-10 RX ADMIN — TAMSULOSIN HYDROCHLORIDE 0.4 MG: 0.4 CAPSULE ORAL at 08:31

## 2022-08-10 RX ADMIN — ATORVASTATIN CALCIUM 10 MG: 20 TABLET, FILM COATED ORAL at 08:30

## 2022-08-10 RX ADMIN — Medication 3 MG: at 20:56

## 2022-08-10 RX ADMIN — ASPIRIN 81 MG: 81 TABLET, FILM COATED ORAL at 08:30

## 2022-08-10 RX ADMIN — DOCUSATE SODIUM 50MG AND SENNOSIDES 8.6MG 2 TABLET: 8.6; 5 TABLET, FILM COATED ORAL at 08:30

## 2022-08-10 RX ADMIN — HYDROCODONE BITARTRATE AND ACETAMINOPHEN 1 TABLET: 5; 325 TABLET ORAL at 20:57

## 2022-08-10 RX ADMIN — ENOXAPARIN SODIUM 40 MG: 100 INJECTION SUBCUTANEOUS at 22:11

## 2022-08-10 RX ADMIN — Medication 1000 MCG: at 08:30

## 2022-08-10 RX ADMIN — Medication 3 MG: at 01:30

## 2022-08-10 RX ADMIN — DOCUSATE SODIUM 50MG AND SENNOSIDES 8.6MG 2 TABLET: 8.6; 5 TABLET, FILM COATED ORAL at 20:59

## 2022-08-10 RX ADMIN — CEFTRIAXONE SODIUM 1 G: 1 INJECTION, POWDER, FOR SOLUTION INTRAMUSCULAR; INTRAVENOUS at 12:16

## 2022-08-10 NOTE — PLAN OF CARE
Problem: Adult Inpatient Plan of Care  Goal: Plan of Care Review  Outcome: Ongoing, Progressing  Flowsheets (Taken 8/10/2022 0530)  Progress: improving  Plan of Care Reviewed With: patient  Outcome Evaluation: Patient was given Melatonin for sleep per his request and he slept between care. Bladder scan done and in and out catheter had 700 ml of urine out with blood clots. Urine specimen was sent to lab. Bed alarm on. No complaints of pain this shift. Will continue to monitor vital signs, labs and comfort.   Goal Outcome Evaluation:  Plan of Care Reviewed With: patient        Progress: improving  Outcome Evaluation: Patient was given Melatonin for sleep per his request and he slept between care. Bladder scan done and in and out catheter had 700 ml of urine out with blood clots. Urine specimen was sent to lab. Bed alarm on. No complaints of pain this shift. Will continue to monitor vital signs, labs and comfort.

## 2022-08-10 NOTE — PLAN OF CARE
Problem: Adult Inpatient Plan of Care  Goal: Plan of Care Review  Outcome: Ongoing, Progressing  Goal: Patient-Specific Goal (Individualized)  Outcome: Ongoing, Progressing  Goal: Absence of Hospital-Acquired Illness or Injury  Outcome: Ongoing, Progressing  Intervention: Identify and Manage Fall Risk  Recent Flowsheet Documentation  Taken 8/10/2022 0800 by Evelyn Watson RN  Safety Promotion/Fall Prevention: safety round/check completed  Intervention: Prevent and Manage VTE (Venous Thromboembolism) Risk  Recent Flowsheet Documentation  Taken 8/10/2022 0800 by Evelyn Watson RN  Activity Management: up in chair  VTE Prevention/Management:   bilateral   sequential compression devices on  Intervention: Prevent Infection  Recent Flowsheet Documentation  Taken 8/10/2022 0800 by Evelyn Watson RN  Infection Prevention:   personal protective equipment utilized   hand hygiene promoted   rest/sleep promoted  Goal: Optimal Comfort and Wellbeing  Outcome: Ongoing, Progressing  Goal: Readiness for Transition of Care  Outcome: Ongoing, Progressing     Problem: Fall Injury Risk  Goal: Absence of Fall and Fall-Related Injury  Outcome: Ongoing, Progressing  Intervention: Identify and Manage Contributors  Recent Flowsheet Documentation  Taken 8/10/2022 0800 by Evelyn Watson RN  Medication Review/Management: medications reviewed  Intervention: Promote Injury-Free Environment  Recent Flowsheet Documentation  Taken 8/10/2022 0800 by Evelyn Watson RN  Safety Promotion/Fall Prevention: safety round/check completed   Goal Outcome Evaluation:

## 2022-08-10 NOTE — PLAN OF CARE
Goal Outcome Evaluation:  Plan of Care Reviewed With: patient        Progress: improving  Outcome Evaluation: Patient is agreeable to PT this AM and demonstrated improved overall functional mobility. He completed multiple STS from elevated EOB requiring CGA but required min-modA for STS from lower chair. Pt was able to ambulate 50ft using rwx requiring Alejandro and no overt LOB or unsteadiness noted. Cues for upright posture in standing. Will continue to progress as pt tolerates.

## 2022-08-10 NOTE — CONSULTS
Name: Max Shah ADMIT: 2022   : 1934  PCP: Karli Alonzo APRN    MRN: [id number] LOS: 1 days   AGE/SEX: 88 y.o. male  ROOM: [address]      Patient Care Team:  Karli Alonzo APRN as PCP - General (Family Medicine)  Lulú Bettencourt MD as Consulting Physician (Cardiology)  Bear Boyd MD as Consulting Physician (Otolaryngology)  Skip Malik MD as Consulting Physician (Gastroenterology)  Clarke Bettencourt MD as Consulting Physician (Dermatology)  Audrey Elena MD as Consulting Physician (Vascular Surgery)  Chief Complaint   Patient presents with   • Fall   • Knee Pain     CC: Abdominal aortic aneurysm evaluation.    Subjective     Inpatient Vascular Surgery Consult  Consult performed by: Isaias Rubalcava MD  Consult ordered by: Walker Shea APRN  Reason for consult: Abdominal aortic aneurysm evaluation.        History generated from review of chart and verification of findings at bedside with patient.    Mr. Shah is a 88 y.o. non-smoker with a history of hypertension, coronary artery disease, carotid artery stenosis s/p intervention in , MARKO, chronic RBBB who presents to [facility] from home after a mechanical fall (tripped on his rug in his home) yesterday without loss of consciousness complaining of bilateral knee pain. He reports that both knees swelled immediately after his fall, but the right returned to normal quickly while the left remained swollen. He states that last night went okay and he managed to eat dinner and go to bed, but when he woke, I wasn't able to move the left knee and couldn't get out of bed or walk. He lives alone.     History of Present Illness  Review of Systems   Constitutional: Negative for chills, fatigue and fever.   HENT: Negative for postnasal drip, rhinorrhea, sinus pressure and sinus pain.    Eyes: Negative.    Respiratory: Negative for cough and shortness of breath.    Cardiovascular: Negative for  chest pain and palpitations.   Gastrointestinal: Negative for diarrhea, nausea and vomiting.   Endocrine: Negative for polydipsia and polyuria.   Genitourinary: Negative for dysuria, frequency and urgency.   Musculoskeletal: Positive for gait problem and joint swelling. Negative for myalgias.   Skin: Negative for rash and wound.   Allergic/Immunologic: Negative.    Neurological: Negative for syncope and light-headedness.   Hematological: Negative.    Psychiatric/Behavioral: Negative for confusion and decreased concentration.     On abdominal imaging work-up he was seen to have a saccular abdominal aortic aneurysm.  He denies abdominal pain currently.    Review of Systems    Past Medical History:   Diagnosis Date   • Arthritis    • CAD (coronary artery disease)    • Cataracts, bilateral    • CPAP (continuous positive airway pressure) dependence    • Diverticulitis    • Fatigue    • GERD (gastroesophageal reflux disease)    • Hyperlipidemia    • Hypertension    • Knee pain    • PAOD (peripheral arterial occlusive disease) ([facility])    • Shortness of breath    • Sleep apnea    • Varicose veins      Past Surgical History:   Procedure Laterality Date   • ANGIOPLASTY      Cerebral   • CARDIAC CATHETERIZATION     • CAROTID ARTERY ANGIOPLASTY  2001   • CATARACT EXTRACTION W/ INTRAOCULAR LENS IMPLANT Left 2017    Procedure: LT CATARACT PHACO EXTRACTION WITH INTRAOCULAR LENS IMPLANT;  Surgeon: Syed Tang MD;  Location: [facility];  Service:    • CATARACT EXTRACTION W/ INTRAOCULAR LENS IMPLANT Right 2017    Procedure: RT CATARACT PHACO EXTRACTION WITH INTRAOCULAR LENS IMPLANT;  Surgeon: Syed Tang MD;  Location: [facility];  Service:    • THROMBOENDARTERECTOMY Right      Family History   Problem Relation Age of Onset   • Heart disease Other    • Cancer Other    • Breast cancer Mother          age 75   • Atrial fibrillation Father          age 82     Social History      Tobacco Use   • Smoking status: Never Smoker   • Smokeless tobacco: Never Used   Substance Use Topics   • Alcohol use: Yes     Comment: moderate   • Drug use: No     Medications Prior to Admission   Medication Sig Dispense Refill Last Dose   • aspirin 81 MG EC tablet Take 81 mg by mouth daily.   8/5/2022 at Unknown time   • Multiple Vitamin (MULTI-VITAMIN DAILY PO) Take 1 tablet by mouth Daily.   8/5/2022 at Unknown time   • simvastatin (ZOCOR) 20 MG tablet Take 1 tablet by mouth Daily. 90 tablet 1 8/5/2022 at Unknown time   • vitamin B-12 (CYANOCOBALAMIN) 1000 MCG tablet Take 1 tablet by mouth Daily. 90 tablet 3 8/5/2022 at Unknown time   • diltiazem (TIAZAC) 120 MG 24 hr capsule Take 120 mg by mouth.   Unknown at Unknown time   • losartan (COZAAR) 100 MG tablet Take 1 tablet by mouth Daily. 90 tablet 3 More than a month at Unknown time   • nitroglycerin (NITROSTAT) 0.4 MG SL tablet PLACE 1 TABLET UNDER THE TONGUE EVERY 5 (FIVE) MINUTES AS NEEDED FOR CHEST PAIN.  3 More than a month at Unknown time     aspirin, 81 mg, Oral, Daily  atorvastatin, 10 mg, Oral, Daily  bisacodyl, 10 mg, Rectal, Once  bisacodyl, 10 mg, Rectal, Daily  cefTRIAXone, 1 g, Intravenous, Q24H  enoxaparin, 40 mg, Subcutaneous, Nightly  senna-docusate sodium, 2 tablet, Oral, BID  tamsulosin, 0.4 mg, Oral, Daily  vitamin B-12, 1,000 mcg, Oral, Daily         •  acetaminophen  •  HYDROcodone-acetaminophen  •  magnesium hydroxide  •  melatonin  •  ondansetron **OR** ondansetron  •  [COMPLETED] Insert peripheral IV **AND** sodium chloride  Patient has no known allergies.    Objective     Physical Exam:  Physical Exam  Vitals and nursing note reviewed. Exam conducted with a chaperone present.   Constitutional:       Appearance: Normal appearance.   HENT:      Head: Normocephalic and atraumatic.      Right Ear: External ear normal.      Left Ear: External ear normal.      Nose: Nose normal.      Mouth/Throat:      Mouth: Mucous membranes are moist.       Pharynx: No oropharyngeal exudate.   Eyes:      General:         Right eye: No discharge.         Left eye: No discharge.   Cardiovascular:      Rate and Rhythm: Normal rate and regular rhythm.      Pulses: Normal pulses.      Heart sounds: Normal heart sounds.   Pulmonary:      Effort: Pulmonary effort is normal.      Breath sounds: Normal breath sounds.   Abdominal:      General: Abdomen is flat.      Palpations: Abdomen is soft.   Musculoskeletal:         General: Swelling present. Normal range of motion.      Cervical back: Normal range of motion and neck supple.   Skin:     General: Skin is warm.      Capillary Refill: Capillary refill takes less than 2 seconds.      Findings: Bruising present.   Neurological:      General: No focal deficit present.      Mental Status: He is alert and oriented to person, place, and time.   Psychiatric:         Mood and Affect: Mood normal.         Behavior: Behavior normal.     2+ palpable bilateral femoral pulses.  3+ palpable bilateral popliteal pulses.    Vital Signs and Labs:  Vital Signs Patient Vitals for the past 24 hrs:   BP Temp Temp src Pulse Resp SpO2   08/10/22 0714 113/62 99.2 °F (37.3 °C) Oral 80 18 (!) 88 %   08/10/22 0523 140/74 97 °F (36.1 °C) Oral 77 18 93 %   08/09/22 1910 134/75 97.1 °F (36.2 °C) Oral 76 18 93 %     I/O:  I/O last 3 completed shifts:  In: 200 [P.O.:200]  Out: 875 [Urine:875]    CBC    Results from last 7 days   Lab Units 08/10/22  0703 08/09/22  0911 08/07/22  0744 08/06/22  1111   WBC 10*3/mm3 8.49 12.89* 11.27* 9.02   HEMOGLOBIN g/dL 12.0* 14.3 14.1 13.2   PLATELETS 10*3/mm3 201 237 197 197     BMP   Results from last 7 days   Lab Units 08/10/22  0703 08/09/22  0911 08/07/22  0744 08/06/22  1111   SODIUM mmol/L 136 135* 134* 139   POTASSIUM mmol/L 3.7 4.2 4.2 4.3   CHLORIDE mmol/L 101 100 101 103   CO2 mmol/L 25.3 24.0 22.0 26.7   BUN mg/dL 19 22 16 20   CREATININE mg/dL 0.81 1.00 0.84 1.10   GLUCOSE mg/dL 136* 113* 123* 112*      Radiology(recent) CT Abdomen Pelvis With Contrast    Result Date: 8/9/2022  1. There is no evidence for an intra-abdominal mass. It is possible that the urinary bladder was distended on the prior examination and the patient has since voided. The urinary bladder is not abnormally distended on this examination. 2. There is a 3.1 x 3.1 cm distal infrarenal abdominal aortic aneurysm. Ulcerated plaque within the aneurysm sac is noted. 3. Moderate bilateral renal cortical atrophy. 4. Sigmoid diverticulosis.  This report was finalized on 8/9/2022 4:50 PM by Dr. Wilfrido Springer M.D.      XR Abdomen KUB    Result Date: 8/9/2022  Large masslike opacity projecting over the lower abdomen and pelvis. This could reflect a pelvic mass or could be a severely distended bladder. Suggest correlation with CT or bladder scan  This report was finalized on 8/9/2022 11:52 AM by Dr. Leonid Bettencourt M.D.        Active Hospital Problems    Diagnosis  POA   • **Left knee injury [S89.92XA]  Yes   • Acute UTI (urinary tract infection) [N39.0]  Clinically Undetermined   • Abdominal aortic aneurysm (AAA) without rupture (HCC) [I71.4]  Yes   • Acute urinary retention [R33.8]  Clinically Undetermined   • Acute pain of left knee [M25.562]  Yes   • Fall [W19.XXXA]  Yes   • Right bundle branch block [I45.10]  Yes   • Obstructive sleep apnea [G47.33]  Yes   • Stenosis of carotid artery [I65.29]  Yes   • Hypertension [I10]  Yes   • CAD (coronary artery disease) [I25.10]  Yes      Resolved Hospital Problems   No resolved problems to display.     Problem Points:  4:  Patient has a new problem, with additional work-up planned  Total problem points:4 or more    Data Points:  1:  I have reviewed or order clinical lab test  1:  I have reviewed or order radiology test (except heart catheterization or echo)  2:  I have personally and independently review of image, tracing, or specimen  Total data points:4 or more    Risk Points:  High:  Any illness that  poses threat to life or body funciton    MDM requires 2/3 (Problem points, Data points and Risk)  MDM Prob point Data point Risk   SF 1 1 Minimal   Low 2 2 Low   Mod 3 3 Moderate   High 4 4 High     Code requires 3/3 (MDM, History and Exam)  Code MDM History Exam Time   99386 SF/Low Detailed Detailed 30   11886 Mod Comprehensive Comprehensive 50   37513 High Comprehensive Comprehensive 70     Detailed history:  4 elements HPI or status of 3 chronic problems; 2-9 system ROS  Comprehensive:  4 elements HPI or status of 3 chronic problems;  10 system ROS    Detailed Exam:  12 findings from any organ system  Comprehensive Exam:  2 findings from each of 9 systems.   92403    Assessment & Plan       Left knee injury    Hypertension    CAD (coronary artery disease)    Stenosis of carotid artery    Obstructive sleep apnea    Fall    Right bundle branch block    Acute urinary retention    Acute pain of left knee    Acute UTI (urinary tract infection)    Abdominal aortic aneurysm (AAA) without rupture (HCC)    88 y.o. male patient with incidentally identified saccular appearing abdominal aortic aneurysm.  Currently he is without abdominal pain.  I do not feel this aneurysm requires any treatment while he is in the hospital for his acute issues.  I would like to follow him up in the short-term as an outpatient to discuss possible elective exclusion with aortic stent graft.    Interestingly does have very prominent popliteal pulses bilaterally.  Due to the high incidence of concurrent popliteal aneurysms with abdominal aortic aneurysms I am recommending CT angiogram with runoff for full evaluation.  We will order this today.    I discussed the patients findings and my recommendations with patient.    Isaias Rubalcava MD  08/10/22  16:52 EDT    Please call my office with any question: [phone]

## 2022-08-10 NOTE — PROGRESS NOTES
Name: Max Shah ADMIT: 2022   : 1934  PCP: Cheri Karli DOUGLAS    MRN: [id number] LOS: 1 days   AGE/SEX: 88 y.o. male  ROOM: [facility]     Subjective   Subjective   Patient seen at approximately 1300 on 8/10/2022--appears elderly, frail, generally weak, relatively comfortable, & in no apparent distress.  Tolerating diet.  Discussed with RN.     Review of Systems   Constitutional: Negative for chills and fever.   Respiratory: Negative for cough and shortness of breath.    Cardiovascular: Negative for chest pain and leg swelling.   Gastrointestinal: Negative for abdominal distention, constipation, diarrhea, nausea and vomiting.   Genitourinary: Positive for difficulty urinating. Negative for dysuria.   Musculoskeletal: Negative for arthralgias (improving), back pain and gait problem (improving).        Objective   Objective   Vital Signs  Temp:  [97 °F (36.1 °C)-99.2 °F (37.3 °C)] 99.2 °F (37.3 °C)  Heart Rate:  [75-80] 80  Resp:  [18] 18  BP: (113-172)/(62-79) 113/62  SpO2:  [88 %-95 %] 88 %  on   ;   Device (Oxygen Therapy): room air  Body mass index is 25.1 kg/m².     Physical Exam  Constitutional:       General: He is not in acute distress.     Appearance: He is not toxic-appearing.   Cardiovascular:      Rate and Rhythm: Normal rate.      Heart sounds: Normal heart sounds.   Pulmonary:      Effort: Pulmonary effort is normal.      Breath sounds: Normal breath sounds.   Abdominal:      General: There is distension.      Palpations: Abdomen is soft.      Tenderness: There is abdominal tenderness.   Musculoskeletal:         General: Tenderness (left knee) present.      Right lower leg: No edema.      Left lower leg: No edema.   Skin:     General: Skin is warm and dry.   Neurological:      Mental Status: He is alert.         Results Review     I reviewed the patient's new clinical results.  Results from last 7 days   Lab Units 08/10/22  0703 22  0911 22  0744 22  1111   WBC  10*3/mm3 8.49 12.89* 11.27* 9.02   HEMOGLOBIN g/dL 12.0* 14.3 14.1 13.2   PLATELETS 10*3/mm3 201 237 197 197     Results from last 7 days   Lab Units 08/10/22  0703 08/09/22  0911 08/07/22  0744 08/06/22  1111   SODIUM mmol/L 136 135* 134* 139   POTASSIUM mmol/L 3.7 4.2 4.2 4.3   CHLORIDE mmol/L 101 100 101 103   CO2 mmol/L 25.3 24.0 22.0 26.7   BUN mg/dL 19 22 16 20   CREATININE mg/dL 0.81 1.00 0.84 1.10   GLUCOSE mg/dL 136* 113* 123* 112*   EGFR mL/min/1.73 84.8 72.4 83.9 64.6       Results from last 7 days   Lab Units 08/10/22  0703 08/09/22  0911 08/07/22  0744 08/06/22  1111   CALCIUM mg/dL 8.8 9.4 9.1 9.2       No results found for: HGBA1C, POCGLU    CT Abdomen Pelvis With Contrast    Result Date: 8/9/2022  1. There is no evidence for an intra-abdominal mass. It is possible that the urinary bladder was distended on the prior examination and the patient has since voided. The urinary bladder is not abnormally distended on this examination. 2. There is a 3.1 x 3.1 cm distal infrarenal abdominal aortic aneurysm. Ulcerated plaque within the aneurysm sac is noted. 3. Moderate bilateral renal cortical atrophy. 4. Sigmoid diverticulosis.  This report was finalized on 8/9/2022 4:50 PM by Dr. Wilfrido Springer M.D.      XR Abdomen KUB    Result Date: 8/9/2022  Large masslike opacity projecting over the lower abdomen and pelvis. This could reflect a pelvic mass or could be a severely distended bladder. Suggest correlation with CT or bladder scan  This report was finalized on 8/9/2022 11:52 AM by Dr. Leonid Bettencourt M.D.      Scheduled Medications  aspirin, 81 mg, Oral, Daily  atorvastatin, 10 mg, Oral, Daily  bisacodyl, 10 mg, Rectal, Once  bisacodyl, 10 mg, Rectal, Daily  cefTRIAXone, 1 g, Intravenous, Q24H  enoxaparin, 40 mg, Subcutaneous, Nightly  senna-docusate sodium, 2 tablet, Oral, BID  tamsulosin, 0.4 mg, Oral, Daily  vitamin B-12, 1,000 mcg, Oral, Daily    Infusions   Diet  Diet Regular       Assessment/Plan      Active Hospital Problems    Diagnosis  POA   • **Left knee injury [S89.92XA]  Yes   • Acute UTI (urinary tract infection) [N39.0]  Clinically Undetermined   • Abdominal aortic aneurysm (AAA) without rupture (HCC) [I71.4]  Yes   • Acute urinary retention [R33.8]  Clinically Undetermined   • Acute pain of left knee [M25.562]  Yes   • Fall [W19.XXXA]  Yes   • Right bundle branch block [I45.10]  Yes   • Obstructive sleep apnea [G47.33]  Yes   • Stenosis of carotid artery [I65.29]  Yes   • Hypertension [I10]  Yes   • CAD (coronary artery disease) [I25.10]  Yes      Resolved Hospital Problems   No resolved problems to display.       88 y.o. male admitted with Left knee injury.      · Left knee injury-MRI reviewed. Ortho s/p injection at bedside for advanced osteoarthritis with a degenerative meniscal tear. Pain tolerated / better control. Bowel regimen. PT & OT following--recommend SNF.  · Abdominal distention / Acute urinary retention-XR abd large mass.  Greater than 3000 mL removed with straight cath.  Continue Flomax. CT A/P with contrast incidental 3 cm AAA with ulcerated plaque--consult vascular surgeon.  UA reviewed/C&S pending.  Provide 5 days course empiric IV ceftriaxone. Urology consulted for persistent urinary retention requiring frequent straight catheterization.   · Hypertension-apparently not taking any BP medications at home anymore. BP acceptable at present following urinary relief of bladder / most likely due to acute urinary retention.    · Coronary artery disease-aspirin/statin. Normal stress test last month.  · Carotid artery stenosis s/p intervention in 2001. 60-70% bilateral stenosis on most recent ultrasound  · MARKO-continue cpap if available  · Chronic RBBB  · Constipation-resolved.  Continue current MOM PRN, Pericolace, dulcolax supp PRN.        · Enoxaparin for DVT prophylaxis.  · CPR  · Discussed with RN & patient.   · Anticipate discharge pending urology & vascular recommendation/ SNF -- CCP  following    DOUGLAS Sherman  [facility]  08/10/22  14:43 EDT

## 2022-08-10 NOTE — CONSULTS
FIRST UROLOGY CONSULT      Patient Identification:  NAME:  Max Shah  Age:  88 y.o.   Sex:  male   :  1934   MRN:  [id number]       Chief complaint: Hard of hearing difficulty urinating    History of present illness: 88-year-old gentleman who lives alone gives no previous urological history and has not been to our office he suffered a fall with no loss of consciousness during this hospitalization has had transient urinary retention requiring intermittent catheterization was placed on Flomax yesterday he has voided today with a residual of 150 cc at home he states he has had no problems urinating good stream continent urine is clear with no history of urinary tract infections review of his records over the past 5 years reveals prior urinalyses  clear    Abdominal CT scan was performed on 2020 showing that the urinary bladder was mildly distended prior to the study and was noted to be decompressed    Kidneys show bilateral renal cortical atrophy    Most recent urinalysis shows pyuria and bacteriuria with urine culture pending patient on ceftriaxone      Past medical history:  Past Medical History:   Diagnosis Date   • Arthritis    • CAD (coronary artery disease)    • Cataracts, bilateral    • CPAP (continuous positive airway pressure) dependence    • Diverticulitis    • Fatigue    • GERD (gastroesophageal reflux disease)    • Hyperlipidemia    • Hypertension    • Knee pain    • PAOD (peripheral arterial occlusive disease) ([facility])    • Shortness of breath    • Sleep apnea    • Varicose veins        Past surgical history:  Past Surgical History:   Procedure Laterality Date   • ANGIOPLASTY      Cerebral   • CARDIAC CATHETERIZATION     • CAROTID ARTERY ANGIOPLASTY  2001   • CATARACT EXTRACTION W/ INTRAOCULAR LENS IMPLANT Left 2017    Procedure: LT CATARACT PHACO EXTRACTION WITH INTRAOCULAR LENS IMPLANT;  Surgeon: Syed Tang MD;  Location: [facility] OR [facility] [place];  Service:    •  CATARACT EXTRACTION W/ INTRAOCULAR LENS IMPLANT Right 2017    Procedure: RT CATARACT PHACO EXTRACTION WITH INTRAOCULAR LENS IMPLANT;  Surgeon: Syed Tang MD;  [place] [facility] [place] [facility] [place]  Service:    • THROMBOENDARTERECTOMY Right        Allergies:  Patient has no known allergies.    Home medications:  Medications Prior to Admission   Medication Sig Dispense Refill Last Dose   • aspirin 81 MG EC tablet Take 81 mg by mouth daily.   2022 at Unknown time   • Multiple Vitamin (MULTI-VITAMIN DAILY PO) Take 1 tablet by mouth Daily.   2022 at Unknown time   • simvastatin (ZOCOR) 20 MG tablet Take 1 tablet by mouth Daily. 90 tablet 1 2022 at Unknown time   • vitamin B-12 (CYANOCOBALAMIN) 1000 MCG tablet Take 1 tablet by mouth Daily. 90 tablet 3 2022 at Unknown time   • diltiazem (TIAZAC) 120 MG 24 hr capsule Take 120 mg by mouth.   Unknown at Unknown time   • losartan (COZAAR) 100 MG tablet Take 1 tablet by mouth Daily. 90 tablet 3 More than a month at Unknown time   • nitroglycerin (NITROSTAT) 0.4 MG SL tablet PLACE 1 TABLET UNDER THE TONGUE EVERY 5 (FIVE) MINUTES AS NEEDED FOR CHEST PAIN.  3 More than a month at Unknown time        Hospital medications:  aspirin, 81 mg, Oral, Daily  atorvastatin, 10 mg, Oral, Daily  bisacodyl, 10 mg, Rectal, Once  bisacodyl, 10 mg, Rectal, Daily  cefTRIAXone, 1 g, Intravenous, Q24H  enoxaparin, 40 mg, Subcutaneous, Nightly  senna-docusate sodium, 2 tablet, Oral, BID  tamsulosin, 0.4 mg, Oral, Daily  vitamin B-12, 1,000 mcg, Oral, Daily         •  acetaminophen  •  HYDROcodone-acetaminophen  •  magnesium hydroxide  •  melatonin  •  ondansetron **OR** ondansetron  •  [COMPLETED] Insert peripheral IV **AND** sodium chloride    Family history:  Family History   Problem Relation Age of Onset   • Heart disease Other    • Cancer Other    • Breast cancer Mother          age 75   • Atrial fibrillation Father          age 82       Social history:  Social  History     Tobacco Use   • Smoking status: Never Smoker   • Smokeless tobacco: Never Used   Substance Use Topics   • Alcohol use: Yes     Comment: moderate   • Drug use: No       Review of systems:    Negative 12-system ROS except for the following: No fever and chills      Objective:  TMax 24 hours:   Temp (24hrs), Av.8 °F (36.6 °C), Min:97 °F (36.1 °C), Max:99.2 °F (37.3 °C)      Vitals Ranges:   Temp:  [97 °F (36.1 °C)-99.2 °F (37.3 °C)] 99.2 °F (37.3 °C)  Heart Rate:  [75-80] 80  Resp:  [18] 18  BP: (113-172)/(62-79) 113/62    Intake/Output Last 3 shifts:  I/O last 3 completed shifts:  In: 200 [P.O.:200]  Out: 875 [Urine:875]     Physical Exam:       General Appearance:    Alert, cooperative, in no acute distress hard of hearing   Head:    Normocephalic, without obvious abnormality, atraumatic   Eyes:           conjunctivae and corneas clear   Ears:    Normal external inspection   Throat:     Neck:   Supple, no LAD, trachea midline   Back:     No CVA tenderness   Lungs:     Respirations unlabored, symmetric excursion    Heart:    RRR, intact peripheral pulses   Abdomen:    Soft nontender no masses   :   Circumcised male penoscrotal and testicles are normal anus and perineum are normal   TAMIKA:  Extremities:  Empty prostates nontender about 25 cc smooth and symmetric  No edema, no deformity   CT scan report and films reviewed by me prior records including urinalysis previous to this admission were clear and reviewed by me            Results review:   I reviewed the patient's new clinical results.    Data review:  Lab Results (last 24 hours)     Procedure Component Value Units Date/Time    Basic Metabolic Panel [[id number]]  (Abnormal) Collected: 08/10/22 0703    Specimen: Blood Updated: 08/10/22 075     Glucose 136 mg/dL      BUN 19 mg/dL      Creatinine 0.81 mg/dL      Sodium 136 mmol/L      Potassium 3.7 mmol/L      Chloride 101 mmol/L      CO2 25.3 mmol/L      Calcium 8.8 mg/dL      BUN/Creatinine Ratio  23.5     Anion Gap 9.7 mmol/L      eGFR 84.8 mL/min/1.73      Comment: National Kidney Foundation and American Society of Nephrology (ASN) Task Force recommended calculation based on the Chronic Kidney Disease Epidemiology Collaboration (CKD-EPI) equation refit without adjustment for race.       Narrative:      GFR Normal >60  Chronic Kidney Disease <60  Kidney Failure <15      CBC (No Diff) [[id number]]  (Abnormal) Collected: 08/10/22 0703    Specimen: Blood Updated: 08/10/22 0751     WBC 8.49 10*3/mm3      RBC 4.05 10*6/mm3      Hemoglobin 12.0 g/dL      Hematocrit 36.5 %      MCV 90.1 fL      MCH 29.6 pg      MCHC 32.9 g/dL      RDW 12.6 %      RDW-SD 41.3 fl      MPV 9.3 fL      Platelets 201 10*3/mm3     Urinalysis, Microscopic Only - Urine, Clean Catch [[id number]]  (Abnormal) Collected: 08/10/22 0518    Specimen: Urine, Clean Catch Updated: 08/10/22 0555     RBC, UA Too Numerous to Count /HPF      WBC, UA Too Numerous to Count /HPF      Bacteria, UA 2+ /HPF      Squamous Epithelial Cells, UA None Seen /HPF      Hyaline Casts, UA None Seen /LPF      Methodology Manual Light Microscopy    Urine Culture - Urine, Urine, Clean Catch [[id number]] Collected: 08/10/22 0518    Specimen: Urine, Clean Catch Updated: 08/10/22 0555    Urinalysis With Culture If Indicated - Urine, Clean Catch [[id number]]  (Abnormal) Collected: 08/10/22 0518    Specimen: Urine, Clean Catch Updated: 08/10/22 0531     Color, UA Yellow     Appearance, UA Cloudy     pH, UA <=5.0     Specific Gravity, UA >=1.030     Glucose, UA Negative     Ketones, UA Negative     Bilirubin, UA Negative     Blood, UA Large (3+)     Protein, UA 30 mg/dL (1+)     Leuk Esterase, UA Moderate (2+)     Nitrite, UA Negative     Urobilinogen, UA 0.2 E.U./dL    Narrative:      In absence of clinical symptoms, the presence of pyuria, bacteria, and/or nitrites on the urinalysis result does not correlate with infection.           Imaging:  Imaging Results (Last 24 Hours)      Procedure Component Value Units Date/Time    CT Abdomen Pelvis With Contrast [[id number]] Collected: 08/09/22 1610     Updated: 08/09/22 1653    Narrative:      CT OF THE ABDOMEN AND PELVIS WITH CONTRAST     HISTORY: 88-year-old male with a history of an abnormal bowel radiograph  which raised concern regarding a mass in the abdomen.     TECHNIQUE: Contiguous axial images were obtained through the abdomen and  pelvis following intravenous administration of nonionic contrast. Oral  contrast was not administered. Radiation dose reduction techniques were  utilized, including automated exposure control and exposure modulation  based on body size.     COMPARISON: KUB, 08/09/2022.     FINDINGS: The visualized portion of the lung bases are clear of  consolidation. Mild volume loss at the base of the left lower lobe is  noted. Subpleural parenchymal scarring in the right lower lobe is noted  and there is mild interstitial opacification at the base of the right  lower lobe. The visualized portion of the heart appears normal. The  liver has a normal appearance. The gallbladder appears normal. The  pancreas has a normal appearance. The kidneys demonstrate moderate  bilateral renal cortical atrophy. The adrenal glands appear normal. The  spleen has a normal appearance. Diverticulosis of the sigmoid colon is  noted. There is a 3.1 x 3.1 cm distal infrarenal abdominal aortic  aneurysm. Focal ulcerated plaque that measures on the order of 1.5 x 1.2  cm is noted within the aneurysm. A small area of focal ulcerated plaque  that measures on the order of 0.8 x 0.7 cm is also noted within the  aneurysm. The urinary bladder is decompressed with only a small amount  of residual volume. Severe L5-S1 degenerative disc disease is noted in  conjunction with moderate L4-5 and L1-2 degenerative disc disease.       Impression:      1. There is no evidence for an intra-abdominal mass. It is possible that  the urinary bladder was distended on the  prior examination and the  patient has since voided. The urinary bladder is not abnormally  distended on this examination.  2. There is a 3.1 x 3.1 cm distal infrarenal abdominal aortic aneurysm.  Ulcerated plaque within the aneurysm sac is noted.  3. Moderate bilateral renal cortical atrophy.  4. Sigmoid diverticulosis.     This report was finalized on 8/9/2022 4:50 PM by Dr. Wilfrido Springer M.D.                Assessment:       Left knee injury    Hypertension    CAD (coronary artery disease)    Stenosis of carotid artery    Obstructive sleep apnea    Fall    Right bundle branch block    Acute urinary retention    Acute pain of left knee    Acute UTI (urinary tract infection)    Abdominal aortic aneurysm (AAA) without rupture (HCC)    Urinary retention transient on Flomax    Plan:     Discussed with nurse Rivas and the patient continue with the Flomax we will BladderScan in and out cath as necessary recent CT scan negative for upper tract disease and with an adequately empty bladder I will make arrangements for office follow-up for cystoscopy and await urine culture report for appropriate antibiotic therapy    Skyler Vivar MD  08/10/22  14:58 EDT

## 2022-08-10 NOTE — NURSING NOTE
Pt bladder scanned this afternoon and this RN found a reading of 150ml. Pt not complaining of discomfort at this time either. Will rescan at 1800. Urology to be updated.

## 2022-08-10 NOTE — NURSING NOTE
Per AM report, this RN was informed that Mr. Shah has been having urinary retention yesterday and throughout the night last night. This RN reached out to Dr. Silva for parameters on when to place a FC versus straight cathing the pt. This RN expressed concern over causing unnecessary trauma to the pts penis. Awaiting call back.

## 2022-08-10 NOTE — THERAPY TREATMENT NOTE
Patient Name: Max Shah  : 1934    MRN: [id number]                              Today's Date: 8/10/2022       Admit Date: 2022    Visit Dx:     ICD-10-CM ICD-9-CM   1. Acute pain of left knee  M25.562 719.46   2. Fall, initial encounter  W19.XXXA E888.9   3. Unable to ambulate  R26.2 719.7     Patient Active Problem List   Diagnosis   • Hypertension   • CAD (coronary artery disease)   • GERD (gastroesophageal reflux disease)   • Chronic fatigue   • Hand pain, right   • Stenosis of carotid artery   • Diastolic dysfunction   • Hyperlipidemia   • Pulmonary hypertension (HCC)   • Obstructive sleep apnea   • Lightheadedness   • Paresthesia of both feet   • Elevated glucose   • Leg weakness, bilateral   • Balance disorder   • Fall   • Left knee injury   • Right bundle branch block   • Acute urinary retention   • Acute pain of left knee   • Acute UTI (urinary tract infection)     Past Medical History:   Diagnosis Date   • Arthritis    • CAD (coronary artery disease)    • Cataracts, bilateral    • CPAP (continuous positive airway pressure) dependence    • Diverticulitis    • Fatigue    • GERD (gastroesophageal reflux disease)    • Hyperlipidemia    • Hypertension    • Knee pain    • PAOD (peripheral arterial occlusive disease) ([facility])    • Shortness of breath    • Sleep apnea    • Varicose veins      Past Surgical History:   Procedure Laterality Date   • ANGIOPLASTY      Cerebral   • CARDIAC CATHETERIZATION     • CAROTID ARTERY ANGIOPLASTY  2001   • CATARACT EXTRACTION W/ INTRAOCULAR LENS IMPLANT Left 2017    Procedure: LT CATARACT PHACO EXTRACTION WITH INTRAOCULAR LENS IMPLANT;  Surgeon: Syed Tang MD;  Location: [facility] OR [facility] [place];  Service:    • CATARACT EXTRACTION W/ INTRAOCULAR LENS IMPLANT Right 2017    Procedure: RT CATARACT PHACO EXTRACTION WITH INTRAOCULAR LENS IMPLANT;  Surgeon: Syed Tang MD;  Location: [facility] OR [facility] [place];  Service:    • THROMBOENDARTERECTOMY  Right 2001      General Information     Row Name 08/10/22 1156          Physical Therapy Time and Intention    Document Type therapy note (daily note)  -CB     Mode of Treatment individual therapy;physical therapy  -CB     Row Name 08/10/22 1156          General Information    Existing Precautions/Restrictions fall  -CB     Row Name 08/10/22 1156          Cognition    Orientation Status (Cognition) oriented x 3  -CB           User Key  (r) = Recorded By, (t) = Taken By, (c) = Cosigned By    Initials Name Provider Type    CB Vianey Hager PT Physical Therapist               Mobility     Row Name 08/10/22 1156          Bed Mobility    Comment, (Bed Mobility) UIC  -CB     Row Name 08/10/22 1156          Bed-Chair Transfer    Bed-Chair [place] (Transfers) contact guard;verbal cues  -CB     Assistive Device (Bed-Chair Transfers) walker, front-wheeled  -CB     Row Name 08/10/22 1156          Sit-Stand Transfer    Sit-Stand [place] (Transfers) contact guard;minimum assist (75% patient effort);moderate assist (50% patient effort);verbal cues;nonverbal cues (demo/gesture)  -CB     Assistive Device (Sit-Stand Transfers) walker, front-wheeled  -CB     Comment, (Sit-Stand Transfer) CGA from elevated EOB and min-modA from low chair; STSx7 reps total  -CB     Row Name 08/10/22 1156          Gait/Stairs (Locomotion)    [place] Level (Gait) minimum assist (75% patient effort);verbal cues  -CB     Assistive Device (Gait) walker, front-wheeled  -CB     Distance in Feet (Gait) 50ft  -CB     Deviations/Abnormal Patterns (Gait) gait speed decreased;stride length decreased  -CB     Comment, (Gait/Stairs) cues for upright posture during ambulation  -CB           User Key  (r) = Recorded By, (t) = Taken By, (c) = Cosigned By    Initials Name Provider Type    CB Vianey Hager PT Physical Therapist               Obj/Interventions     Row Name 08/10/22 1157          Balance    Balance Assessment standing static  balance;standing dynamic balance;sitting dynamic balance;sitting static balance  -CB     Static Sitting Balance independent  -CB     Dynamic Sitting Balance independent  -CB     Position, Sitting Balance sitting in chair  -CB     Static Standing Balance contact guard  -CB     Dynamic Standing Balance contact guard;minimal assist  -CB     Position/Device Used, Standing Balance supported;walker, front-wheeled  -CB     Balance Interventions sitting;standing;sit to stand;supported;static;minimal challenge;dynamic  -CB           User Key  (r) = Recorded By, (t) = Taken By, (c) = Cosigned By    Initials Name Provider Type    Vianey Lyons, PT Physical Therapist               Goals/Plan    No documentation.                Clinical Impression     Row Name 08/10/22 1158          Pain    Pretreatment Pain Rating 0/10 - no pain  -CB     Row Name 08/10/22 1158          Plan of Care Review    Plan of Care Reviewed With patient  -CB     Progress improving  -CB     Outcome Evaluation Patient is agreeable to PT this AM and demonstrated improved overall functional mobility. He completed multiple STS from elevated EOB requiring CGA but required min-modA for STS from lower chair. Pt was able to ambulate 50ft using rwx requiring Alejandro and no overt LOB or unsteadiness noted. Cues for upright posture in standing. Will continue to progress as pt tolerates.  -CB     Row Name 08/10/22 1158          Positioning and Restraints    Pre-Treatment Position sitting in chair/recliner  -CB     Post Treatment Position chair  -CB     In Chair notified nsg;call light within reach;encouraged to call for assist;exit alarm on;sitting  -CB           User Key  (r) = Recorded By, (t) = Taken By, (c) = Cosigned By    Initials Name Provider Type    Vianey Lyons, PT Physical Therapist               Outcome Measures     Row Name 08/10/22 1200          How much help from another person do you currently need...    Turning from your back to your side while  in flat bed without using bedrails? 3  -CB     Moving from lying on back to sitting on the side of a flat bed without bedrails? 3  -CB     Moving to and from a bed to a chair (including a wheelchair)? 3  -CB     Standing up from a chair using your arms (e.g., wheelchair, bedside chair)? 3  -CB     Climbing 3-5 steps with a railing? 2  -CB     To walk in hospital room? 3  -CB     AM-PAC 6 Clicks Score (PT) 17  -CB     Highest level of mobility 5 --> Static standing  -CB     Row Name 08/10/22 1200          Functional Assessment    Outcome Measure Options AM-PAC 6 Clicks Basic Mobility (PT)  -CB           User Key  (r) = Recorded By, (t) = Taken By, (c) = Cosigned By    Initials Name Provider Type    CB Vianey Hager, PT Physical Therapist                             Physical Therapy Education                 Title: PT OT SLP Therapies (In Progress)     Topic: Physical Therapy (In Progress)     Point: Mobility training (Done)     Learning Progress Summary           Patient Acceptance, E,TB, VU,NR by  at 8/10/2022 1200    Acceptance, E,TB, VU,NR by  at 8/8/2022 0951                   Point: Home exercise program (Not Started)     Learner Progress:  Not documented in this visit.          Point: Body mechanics (Not Started)     Learner Progress:  Not documented in this visit.          Point: Precautions (Not Started)     Learner Progress:  Not documented in this visit.                      User Key     Initials Effective Dates Name Provider Type Discipline     10/22/21 -  Vianey Hager, PT Physical Therapist PT              PT Recommendation and Plan  Planned Therapy Interventions (PT): balance training, bed mobility training, gait training, home exercise program, patient/family education, strengthening, transfer training, ROM (range of motion)  Plan of Care Reviewed With: patient  Progress: improving  Outcome Evaluation: Patient is agreeable to PT this AM and demonstrated improved overall functional mobility. He  completed multiple STS from elevated EOB requiring CGA but required min-modA for STS from lower chair. Pt was able to ambulate 50ft using rwx requiring Alejandro and no overt LOB or unsteadiness noted. Cues for upright posture in standing. Will continue to progress as pt tolerates.     Time Calculation:    PT Charges     Row Name 08/10/22 1200             Time Calculation    Start Time 1030  -CB      Stop Time 1043  -CB      Time Calculation (min) 13 min  -CB      PT Received On 08/10/22  -CB      PT - Next Appointment 08/11/22  -CB              Time Calculation- PT    Total Timed Code Minutes- PT 13 minute(s)  -CB              Timed Charges    49410 - PT Therapeutic Activity Minutes 13  -CB              Total Minutes    Timed Charges Total Minutes 13  -CB       Total Minutes 13  -CB            User Key  (r) = Recorded By, (t) = Taken By, (c) = Cosigned By    Initials Name Provider Type    CB Vianey Hager PT Physical Therapist              Therapy Charges for Today     Code Description Service Date Service Provider Modifiers Qty    [id number]  PT THERAPEUTIC ACT EA 15 MIN 8/10/2022 Vianey Hager PT GP 1          PT G-Codes  Outcome Measure Options: AM-PAC 6 Clicks Basic Mobility (PT)  AM-PAC 6 Clicks Score (PT): 17  AM-PAC 6 Clicks Score (OT): 16    Vianey Hager PT  8/10/2022

## 2022-08-11 ENCOUNTER — APPOINTMENT (OUTPATIENT)
Dept: CT IMAGING | Facility: HOSPITAL | Age: 87
End: 2022-08-11

## 2022-08-11 VITALS
RESPIRATION RATE: 18 BRPM | DIASTOLIC BLOOD PRESSURE: 70 MMHG | WEIGHT: 180 LBS | HEIGHT: 71 IN | BODY MASS INDEX: 25.2 KG/M2 | OXYGEN SATURATION: 96 % | HEART RATE: 76 BPM | TEMPERATURE: 97.8 F | SYSTOLIC BLOOD PRESSURE: 135 MMHG

## 2022-08-11 LAB
ANION GAP SERPL CALCULATED.3IONS-SCNC: 9 MMOL/L (ref 5–15)
BACTERIA SPEC AEROBE CULT: ABNORMAL
BUN SERPL-MCNC: 15 MG/DL (ref 8–23)
BUN/CREAT SERPL: 22.4 (ref 7–25)
CALCIUM SPEC-SCNC: 9.2 MG/DL (ref 8.6–10.5)
CHLORIDE SERPL-SCNC: 103 MMOL/L (ref 98–107)
CO2 SERPL-SCNC: 25 MMOL/L (ref 22–29)
CREAT SERPL-MCNC: 0.67 MG/DL (ref 0.76–1.27)
DEPRECATED RDW RBC AUTO: 40 FL (ref 37–54)
EGFRCR SERPLBLD CKD-EPI 2021: 89.8 ML/MIN/1.73
ERYTHROCYTE [DISTWIDTH] IN BLOOD BY AUTOMATED COUNT: 12.6 % (ref 12.3–15.4)
GLUCOSE SERPL-MCNC: 93 MG/DL (ref 65–99)
HCT VFR BLD AUTO: 36.2 % (ref 37.5–51)
HGB BLD-MCNC: 12.1 G/DL (ref 13–17.7)
MCH RBC QN AUTO: 29.3 PG (ref 26.6–33)
MCHC RBC AUTO-ENTMCNC: 33.4 G/DL (ref 31.5–35.7)
MCV RBC AUTO: 87.7 FL (ref 79–97)
PLATELET # BLD AUTO: 210 10*3/MM3 (ref 140–450)
PMV BLD AUTO: 9.1 FL (ref 6–12)
POTASSIUM SERPL-SCNC: 4 MMOL/L (ref 3.5–5.2)
RBC # BLD AUTO: 4.13 10*6/MM3 (ref 4.14–5.8)
SARS-COV-2 RNA RESP QL NAA+PROBE: NOT DETECTED
SODIUM SERPL-SCNC: 137 MMOL/L (ref 136–145)
WBC NRBC COR # BLD: 8.25 10*3/MM3 (ref 3.4–10.8)

## 2022-08-11 PROCEDURE — 80048 BASIC METABOLIC PNL TOTAL CA: CPT | Performed by: NURSE PRACTITIONER

## 2022-08-11 PROCEDURE — 85027 COMPLETE CBC AUTOMATED: CPT | Performed by: NURSE PRACTITIONER

## 2022-08-11 PROCEDURE — 97530 THERAPEUTIC ACTIVITIES: CPT

## 2022-08-11 PROCEDURE — 25010000002 CEFTRIAXONE PER 250 MG: Performed by: NURSE PRACTITIONER

## 2022-08-11 PROCEDURE — 75635 CT ANGIO ABDOMINAL ARTERIES: CPT

## 2022-08-11 PROCEDURE — 0 IOPAMIDOL PER 1 ML: Performed by: INTERNAL MEDICINE

## 2022-08-11 PROCEDURE — U0005 INFEC AGEN DETEC AMPLI PROBE: HCPCS | Performed by: INTERNAL MEDICINE

## 2022-08-11 PROCEDURE — U0003 INFECTIOUS AGENT DETECTION BY NUCLEIC ACID (DNA OR RNA); SEVERE ACUTE RESPIRATORY SYNDROME CORONAVIRUS 2 (SARS-COV-2) (CORONAVIRUS DISEASE [COVID-19]), AMPLIFIED PROBE TECHNIQUE, MAKING USE OF HIGH THROUGHPUT TECHNOLOGIES AS DESCRIBED BY CMS-2020-01-R: HCPCS | Performed by: INTERNAL MEDICINE

## 2022-08-11 RX ORDER — TAMSULOSIN HYDROCHLORIDE 0.4 MG/1
0.4 CAPSULE ORAL DAILY
Qty: 30 CAPSULE | Refills: 0 | Status: SHIPPED | OUTPATIENT
Start: 2022-08-12

## 2022-08-11 RX ADMIN — IOPAMIDOL 85 ML: 755 INJECTION, SOLUTION INTRAVENOUS at 10:04

## 2022-08-11 RX ADMIN — ASPIRIN 81 MG: 81 TABLET, FILM COATED ORAL at 08:19

## 2022-08-11 RX ADMIN — CEFTRIAXONE SODIUM 1 G: 1 INJECTION, POWDER, FOR SOLUTION INTRAMUSCULAR; INTRAVENOUS at 10:30

## 2022-08-11 RX ADMIN — ATORVASTATIN CALCIUM 10 MG: 20 TABLET, FILM COATED ORAL at 08:19

## 2022-08-11 RX ADMIN — TAMSULOSIN HYDROCHLORIDE 0.4 MG: 0.4 CAPSULE ORAL at 08:20

## 2022-08-11 RX ADMIN — Medication 1000 MCG: at 08:20

## 2022-08-11 RX ADMIN — DOCUSATE SODIUM 50MG AND SENNOSIDES 8.6MG 2 TABLET: 8.6; 5 TABLET, FILM COATED ORAL at 08:19

## 2022-08-11 NOTE — NURSING NOTE
Dr. Gini cabrera for vascular surgery clearance as pt has a bed at [facility] with anticipated transfer today at 2pm.

## 2022-08-11 NOTE — PROGRESS NOTES
Urinary retention Flomax Hodge catheter is been inserted since last seen by me over the past 24 hours    I would recommend continuing the Flomax 0.4 mg daily with a voiding trial in about 5 days afebrile vital signs are stable urine culture is in process    If there is plans to be discharged please notify me at [phone] and I will make arrangements for voiding trial in office follow-up thank you    Follow-up on urine culture

## 2022-08-11 NOTE — DISCHARGE SUMMARY
Patient Name: Max Shah  : 1934  MRN: [id number]    Date of Admission: 2022  Date of Discharge:  2022  Primary Care Physician: Karli Alonzo APRN      Chief Complaint:   Fall and Knee Pain      Discharge Diagnoses     Active Hospital Problems    Diagnosis  POA   • **Left knee injury [S89.92XA]  Yes   • Acute UTI (urinary tract infection) [N39.0]  Clinically Undetermined   • Abdominal aortic aneurysm (AAA) without rupture (HCC) [I71.4]  Yes   • Acute urinary retention [R33.8]  Clinically Undetermined   • Acute pain of left knee [M25.562]  Yes   • Fall [W19.XXXA]  Yes   • Right bundle branch block [I45.10]  Yes   • Obstructive sleep apnea [G47.33]  Yes   • Stenosis of carotid artery [I65.29]  Yes   • Hypertension [I10]  Yes   • CAD (coronary artery disease) [I25.10]  Yes      Resolved Hospital Problems   No resolved problems to display.        Hospital Course     Mr. Shah is a 88 y.o. male with a history of hypertension, coronary artery disease, carotid artery stenosis s/p intervention in , MARKO, chronic RBBB who presented to [facility] initially complaining of mechanical fall (tripped on his rug in his home) yesterday without loss of consciousness complaining of bilateral knee pain.  Please see the admitting history and physical for further details.  He was found to have eft knee contusion, hemarthrosis, possible occult fracture, right knee contusion, hematoma and was admitted to the hospital for further evaluation and treatment.      Reportedly lives alone.      Orthopedic surgical service evaluated MRI and provided injection at bedside for advanced osteoarthritis with degenerative meniscal tear.  Pain tolerated thereafter.  PT and OT following recommend SNF discharge.    Incidental abdominal distention warranted abdominal x-ray with findings of large mass and subsequent CT abdomen pelvis reported findings acute urinary retention with bladder distention.  Greater than  3000 mL removed with straight cath.  Flomax initiated.  Urology evaluated patient and advised Hodge catheter if urinary retention persist.  Catheter placed on 8/11/2022 and follow-up advised with urology after discharge.  Urology recommend voiding trial in 5 days.    Incidental 3 cm AAA with ulcerated plaque identified on CT.  Vascular surgeon consulted & ordered CT angiogram with runoff for full evaluation.  Recommend short-term follow-up as outpatient to discuss possible elective exclusion with aortic stent graft.    Patient appears medically stable for discharge to facility on 8/11/2022.  Tolerating diet minimal physical activity with standby assistance.    Day of Discharge     Physical Exam:  Temp:  [97.8 °F (36.6 °C)-100.1 °F (37.8 °C)] 97.8 °F (36.6 °C)  Heart Rate:  [70-82] 76  Resp:  [18] 18  BP: (135-164)/(70-80) 135/70  Body mass index is 25.1 kg/m².     Physical Exam   Constitutional:       General: He is not in acute distress.     Appearance: Normal appearance. He is not toxic-appearing.   HENT:      Head: Normocephalic.      Comments: Left forehead with quarter sized bump     Mouth/Throat:      Mouth: Mucous membranes are moist.      Pharynx: Oropharynx is clear. No oropharyngeal exudate.   Eyes:      Extraocular Movements: Extraocular movements intact.      Conjunctiva/sclera: Conjunctivae normal.      Pupils: Pupils are equal, round, and reactive to light.   Cardiovascular:      Rate and Rhythm: Normal rate and regular rhythm.      Heart sounds: Normal heart sounds.   Pulmonary:      Effort: Pulmonary effort is normal. No respiratory distress.      Breath sounds: Normal breath sounds. No wheezing, rhonchi or rales.   Abdominal:      General: Bowel sounds are normal. There is no distension.      Palpations: Abdomen is soft.      Tenderness: There is no abdominal tenderness.   Musculoskeletal:         General: Swelling and tenderness present.      Cervical back: Normal range of motion and neck supple.       Comments: Left knee, unable to left off bed, cannot extend   Skin:     General: Skin is dry.      Findings: No erythema or rash.   Neurological:      General: No focal deficit present.      Mental Status: He is alert and oriented to person, place, and time.   Psychiatric:         Mood and Affect: Mood normal.         Behavior: Behavior normal.       Consultants     Consult Orders (all) (From admission, onward)     Start     Ordered    08/11/22 0935  Inpatient Nutrition Consult  Once        Comments: Pt states he has lost 40 lbs in less than an year. No appetite. Possibly depression related d/t wife going into nursing home?   Provider:  (Not yet assigned)    08/11/22 0935    08/10/22 1355  Inpatient Vascular Surgery Consult  Once        Specialty:  Vascular Surgery  Provider:  Dianne Clifton Jr., MD    08/10/22 1356    08/10/22 0853  Inpatient Urology Consult  Once        Specialty:  Urology  Provider:  Hipolito Bettencourt MD    08/10/22 0853    08/06/22 1233  Inpatient Orthopedic Surgery Consult  Once        Specialty:  Orthopedic Surgery  Provider:  Gordon Kay MD    08/06/22 1232    08/06/22 1212  [facility] (on-call MD unless specified) Details  Once        Specialty:  Hospitalist  Provider:  (Not yet assigned)    08/06/22 1211              Procedures     * Surgery not found *      Imaging Results (All)     Procedure Component Value Units Date/Time    CT Angio Abdominal Aorta Bilateral Iliofem Runoff [[id number]] Collected: 08/11/22 1109     Updated: 08/11/22 1144    Narrative:      CT ANGIOGRAM OF THE ABDOMEN AND PELVIS WITH BILATERAL LOWER EXTREMITY  RUNOFF. MULTIPLE CORONAL, SAGITTAL, AND 3-D RECONSTRUCTIONS.     HISTORY: Aortic aneurysm, prominent popliteal pulses; evaluate for  peripheral aneurysms     TECHNIQUE: Radiation dose reduction techniques were utilized, including  automated exposure control and exposure modulation based on body size.   CT angiogram of the abdomen and pelvis with lower  extremity runoff was  performed. Multiple coronal, sagittal, and 3-D reconstruction images  were obtained.      COMPARISON: CT abdomen and pelvis 08/09/2022     FINDINGS: Evaluation is suboptimal due to respiratory motion.     Bibasilar atelectasis and or pleural parenchymal scarring is present.     There are no findings of small bowel obstruction. The appendix is  incompletely seen. While this examination is not tailored for detailed  evaluation of the abdominal viscera due to contrast timing, no gross  abnormality is seen within the liver, gallbladder, pancreas, spleen or  adrenal glands. Left parapelvic renal cysts subcentimeter renal lesions  are too small to characterize. There is no hydronephrosis. The bladder  is decompressed by Hodge catheter and cannot be evaluated.     No abdominopelvic adenopathy by size criteria.     There is colonic diverticulosis. No free intraperitoneal air is seen.     No suspicious lytic or blastic osseous lesion is present. Linear lucency  projects through the left patella laterally which did not demonstrate  significant surrounding marrow edema on recent MRI. For the purposes of  this dictation, the last well-formed disc space be referred to as L5-S1.  Burst compression deformity of T12 resulting approximately 40% loss of  height and 0.3 cm of retropulsion the central spinal canal.     CT angiography abdomen and pelvis:  *  There is mild stenosis within the origins and proximal aspects of the  bilateral renal arteries arising off the abdominal aorta.  *  Moderate to severe stenosis of the origin of the celiac artery is  present secondary to calcified atherosclerosis. There is mild stenosis  of the proximal aspect of the superior mesenteric artery secondary to  calcified and noncalcified atherosclerosis.  *  Partially thrombosed infrarenal abdominal aortic aneurysm measures  3.4 x 3.2 cm. There is mild stenosis of the right common iliac artery  secondary to calcified  atherosclerosis.     Left lower extremity:  *  No significant stenosis is present within the left external iliac,  common femoral, superficial femoral or popliteal arteries.  *  Extensive atherosclerotic calcification is present at the  trifurcation of the left popliteal artery. Thick calcification along the  course of the left anterior tibial artery extends into the mid to distal  lower leg. The artery then continues as a highly attenuated vessel with  segments of complete to near complete stenosis within its distal aspect;  however, the artery is opacified into the foot.  *  The left peroneal artery is opacified to the level of the ankle joint  and is not visualized distally related to small caliber, slow flow  versus occlusion.  *  The left posterior tibial artery is patent into the foot.     Right lower extremity:  *  No significant stenosis is present within the right external iliac,  common femoral, superficial femoral or popliteal arteries.  *  There is a high takeoff of the right posterior tibial artery which  remains opacified into the foot.  *  Atherosclerotic calcification is present along the course of the  right anterior tibial artery into the mid to distal lower leg. The  artery then continues as a highly attenuated vessel distally and remains  opacified into the foot.  *  The right peroneal artery remains opacified to the level of the ankle  joint and is not visualized distally due to small caliber, slow flow or  occlusion.  *  Prominent asymmetric venous varices are present within the right  lower leg. Subcutaneous soft tissue thickening and fluid is present over  the anterior aspect of the left knee. Bilateral knee joint effusions are  present.       Impression:      1.  Infrarenal abdominal aortic aneurysm measuring up to 3.4 cm.  2.  Moderate to extensive atherosclerotic calcification of the arterial  vasculature of the abdomen, pelvis and bilateral lower extremities  resulting in varying degrees  of stenosis as detailed above.  3.  Burst compression deformity of T12 of indeterminate age. Correlation  patient history and point tenderness recommended to exclude acute  etiology. Findings can be further evaluated with MRI if clinically  indicated.  4.  Other findings as above.     This report was finalized on 8/11/2022 11:41 AM by Dr. Jared Hope M.D.       CT Abdomen Pelvis With Contrast [[id number]] Collected: 08/09/22 1610     Updated: 08/09/22 1653    Narrative:      CT OF THE ABDOMEN AND PELVIS WITH CONTRAST     HISTORY: 88-year-old male with a history of an abnormal bowel radiograph  which raised concern regarding a mass in the abdomen.     TECHNIQUE: Contiguous axial images were obtained through the abdomen and  pelvis following intravenous administration of nonionic contrast. Oral  contrast was not administered. Radiation dose reduction techniques were  utilized, including automated exposure control and exposure modulation  based on body size.     COMPARISON: KUB, 08/09/2022.     FINDINGS: The visualized portion of the lung bases are clear of  consolidation. Mild volume loss at the base of the left lower lobe is  noted. Subpleural parenchymal scarring in the right lower lobe is noted  and there is mild interstitial opacification at the base of the right  lower lobe. The visualized portion of the heart appears normal. The  liver has a normal appearance. The gallbladder appears normal. The  pancreas has a normal appearance. The kidneys demonstrate moderate  bilateral renal cortical atrophy. The adrenal glands appear normal. The  spleen has a normal appearance. Diverticulosis of the sigmoid colon is  noted. There is a 3.1 x 3.1 cm distal infrarenal abdominal aortic  aneurysm. Focal ulcerated plaque that measures on the order of 1.5 x 1.2  cm is noted within the aneurysm. A small area of focal ulcerated plaque  that measures on the order of 0.8 x 0.7 cm is also noted within the  aneurysm. The urinary  bladder is decompressed with only a small amount  of residual volume. Severe L5-S1 degenerative disc disease is noted in  conjunction with moderate L4-5 and L1-2 degenerative disc disease.       Impression:      1. There is no evidence for an intra-abdominal mass. It is possible that  the urinary bladder was distended on the prior examination and the  patient has since voided. The urinary bladder is not abnormally  distended on this examination.  2. There is a 3.1 x 3.1 cm distal infrarenal abdominal aortic aneurysm.  Ulcerated plaque within the aneurysm sac is noted.  3. Moderate bilateral renal cortical atrophy.  4. Sigmoid diverticulosis.     This report was finalized on 8/9/2022 4:50 PM by Dr. Wilfrido Springer M.D.       XR Abdomen KUB [[id number]] Collected: 08/09/22 1149     Updated: 08/09/22 1155    Narrative:      KUB     HISTORY: Abdominal distention     COMPARISON: None available     FINDINGS: There is gas and stool within the colon. There is mild colonic  distention. There may also be some gas-filled loops of small bowel in  the mid abdomen. There is a large masslike opacity projecting over the  lower abdomen and pelvis. The reflect a pelvic mass or could be a  severely distended bladder. Suggest correlation with CT or bladder scan.  Lumbar spine degenerative changes.       Impression:      Large masslike opacity projecting over the lower abdomen and pelvis.  This could reflect a pelvic mass or could be a severely distended  bladder. Suggest correlation with CT or bladder scan     This report was finalized on 8/9/2022 11:52 AM by Dr. Leonid Bettencourt M.D.       MRI Knee Left Without Contrast [[id number]] Collected: 08/08/22 0819     Updated: 08/08/22 1002    Narrative:      MRI LEFT KNEE WITHOUT CONTRAST     HISTORY: Left knee injury after a fall. Unable to extend the left knee.     TECHNIQUE:  MRI left knee includes axial and coronal PD fat-sat as well  as sagittal PD, T1, T2-weighted sequences.      COMPARISON:Left knee x-rays 08/06/2022     FINDINGS:There is an oblique tear of the posterior horn medial meniscus  extending to its undersurface. Degenerative blunting is present in the  free edge of the medial meniscal body which is partially extruded  medially. Degenerative signal is present within the lateral meniscus and  there is free edge degeneration with lateral meniscal body. The cruciate  ligaments, collateral ligament complexes, extensor mechanism are intact.        There is generalized medial compartment articular cartilage thinning  with mild joint space narrowing. Medial and lateral compartment marginal  spur formation is present. There is advanced patellofemoral arthritis  with full-thickness articular cartilage loss particularly at the lateral  aspect of the patellofemoral compartment where there is remodeling of  the articular cortices and subchondral edema and spur formation. Large  knee joint effusion is present and there is a Baker's cyst that extends  5.5 cm craniocaudal dimension. Within superior aspect of this segment of  the Baker's cyst that extends anterior to the medial head gastrocnemius  muscle, there is a 6 mm chronic osteochondral body. Beyond the  patellofemoral compartment, bone marrow signal is essentially normal and  there is no fracture.       Impression:      1. Osteoarthritis is severe at the patellofemoral compartment where  there is full-thickness cartilage loss, remodeling of the articular  surfaces, surrounding subchondral edema.  2. Degenerative oblique tear posterior horn medial meniscus. Medial  meniscal body free edge degeneration with partial medial extrusion. Free  edge degeneration lateral meniscal body.  3. Knee joint effusion with Baker's cyst. 6 mm osteochondral body within  a segment of Baker's cyst anterior to the medial head gastrocnemius  muscle.     This report was finalized on 8/8/2022 9:59 AM by Dr. Chepe Gomez M.D.       CT Head Without Contrast  [[id number]] Collected: 08/06/22 1111     Updated: 08/06/22 1122    Narrative:      CT HEAD AND CERVICAL SPINE WITHOUT CONTRAST     HISTORY: Fall, head and neck injury     TECHNIQUE: Radiation dose reduction techniques were utilized, including  automated exposure control and exposure modulation based on body size.  CT scan of the head and cervical spine was performed without IV  contrast.      COMPARISON: None available     FINDINGS:  CT HEAD:  There is no evidence of intracranial hemorrhage, hydrocephalus, focal  mass lesion or acute cortical-based infarction. Prominence of the  ventricles and CSF spaces consistent with age-appropriate volume loss.  Previous cataract surgeries. Opacification of a few ethmoid air cells.  The mastoids are clear. Carotid siphon calcifications. Calvarium intact.     CT CERVICAL SPINE:  There is no evidence of acute fracture or traumatic subluxation. There  is no prevertebral soft tissue swelling. There is rightward curvature of  the cervical spine. Extensive facet hypertrophy is demonstrated on the  left with fusion of multiple facet joints. Multilevel degenerative disc  space narrowing, greatest in the lower cervical spine with areas of  vacuum disc phenomenon. Some mild central canal narrowing is seen in the  lower cervical spine. Carotid calcifications are present. Lung apices  are clear.       Impression:      1. No acute findings in the head or cervical spine  2. Chronic changes as described above     Radiation dose reduction techniques were utilized, including automated  exposure control and exposure modulation based on body size.     This report was finalized on 8/6/2022 11:19 AM by Dr. Leonid Bettencourt M.D.       CT Cervical Spine Without Contrast [[id number]] Collected: 08/06/22 1111     Updated: 08/06/22 1122    Narrative:      CT HEAD AND CERVICAL SPINE WITHOUT CONTRAST     HISTORY: Fall, head and neck injury     TECHNIQUE: Radiation dose reduction techniques were utilized,  including  automated exposure control and exposure modulation based on body size.  CT scan of the head and cervical spine was performed without IV  contrast.      COMPARISON: None available     FINDINGS:  CT HEAD:  There is no evidence of intracranial hemorrhage, hydrocephalus, focal  mass lesion or acute cortical-based infarction. Prominence of the  ventricles and CSF spaces consistent with age-appropriate volume loss.  Previous cataract surgeries. Opacification of a few ethmoid air cells.  The mastoids are clear. Carotid siphon calcifications. Calvarium intact.     CT CERVICAL SPINE:  There is no evidence of acute fracture or traumatic subluxation. There  is no prevertebral soft tissue swelling. There is rightward curvature of  the cervical spine. Extensive facet hypertrophy is demonstrated on the  left with fusion of multiple facet joints. Multilevel degenerative disc  space narrowing, greatest in the lower cervical spine with areas of  vacuum disc phenomenon. Some mild central canal narrowing is seen in the  lower cervical spine. Carotid calcifications are present. Lung apices  are clear.       Impression:      1. No acute findings in the head or cervical spine  2. Chronic changes as described above     Radiation dose reduction techniques were utilized, including automated  exposure control and exposure modulation based on body size.     This report was finalized on 8/6/2022 11:19 AM by Dr. Leonid Bettencourt M.D.       XR Knee 1 or 2 View Bilateral [[id number]] Collected: 08/06/22 1107     Updated: 08/06/22 1111    Narrative:      BILATERAL KNEES, 2 VIEWS EACH     HISTORY: Bilateral knee pain     COMPARISON: None available     FINDINGS:  Right knee:  Tricompartmental degenerative changes, greatest in the patellofemoral  compartment. No evidence of fracture or joint effusion.     Left knee:  Tricompartmental degenerative changes, greatest in the patellofemoral  compartment. No evidence of fracture or joint  effusion.       Impression:      Bilateral degenerative changes     This report was finalized on 8/6/2022 11:08 AM by Dr. Leonid Bettencourt M.D.           Results for orders placed in visit on 02/10/20    SCANNED VASCULAR STUDIES      Pertinent Labs     Results from last 7 days   Lab Units 08/11/22  0722 08/10/22  0703 08/09/22  0911 08/07/22  0744   WBC 10*3/mm3 8.25 8.49 12.89* 11.27*   HEMOGLOBIN g/dL 12.1* 12.0* 14.3 14.1   PLATELETS 10*3/mm3 210 201 237 197     Results from last 7 days   Lab Units 08/11/22  0722 08/10/22  0703 08/09/22  0911 08/07/22  0744   SODIUM mmol/L 137 136 135* 134*   POTASSIUM mmol/L 4.0 3.7 4.2 4.2   CHLORIDE mmol/L 103 101 100 101   CO2 mmol/L 25.0 25.3 24.0 22.0   BUN mg/dL 15 19 22 16   CREATININE mg/dL 0.67* 0.81 1.00 0.84   GLUCOSE mg/dL 93 136* 113* 123*   EGFR mL/min/1.73 89.8 84.8 72.4 83.9       Results from last 7 days   Lab Units 08/11/22  0722 08/10/22  0703 08/09/22  0911 08/07/22  0744   CALCIUM mg/dL 9.2 8.8 9.4 9.1               Invalid input(s): LDLCALC  Results from last 7 days   Lab Units 08/10/22  0518   URINECX  <10,000 CFU/mL Gram Negative Bacilli*     Results from last 7 days   Lab Units 08/11/22  1143   COVID19  Not Detected       Test Results Pending at Discharge       Discharge Details        Discharge Medications      New Medications      Instructions Start Date   tamsulosin 0.4 MG capsule 24 hr capsule  Commonly known as: FLOMAX   0.4 mg, Oral, Daily   Start Date: August 12, 2022        Continue These Medications      Instructions Start Date   aspirin 81 MG EC tablet   81 mg, Oral, Daily      dilTIAZem 120 MG 24 hr capsule  Commonly known as: TIAZAC   120 mg, Oral      multivitamin tablet tablet  Commonly known as: THERAGRAN   1 tablet, Oral, Daily      nitroglycerin 0.4 MG SL tablet  Commonly known as: NITROSTAT   PLACE 1 TABLET UNDER THE TONGUE EVERY 5 (FIVE) MINUTES AS NEEDED FOR CHEST PAIN.      simvastatin 20 MG tablet  Commonly known as: ZOCOR   20 mg,  Oral, Daily      vitamin B-12 1000 MCG tablet  Commonly known as: CYANOCOBALAMIN   1,000 mcg, Oral, Daily         Stop These Medications    losartan 100 MG tablet  Commonly known as: COZAAR            No Known Allergies    Discharge Disposition:  Skilled Nursing Facility (DC - External)      Discharge Diet:  Diet Order   Procedures   • Diet Regular       Discharge Activity:   Activity Instructions     Activity as Tolerated      Up WIth Assist            CODE STATUS:    Code Status and Medical Interventions:   Ordered at: 08/06/22 1510     Level Of Support Discussed With:    Patient     Code Status (Patient has no pulse and is not breathing):    CPR (Attempt to Resuscitate)     Medical Interventions (Patient has pulse or is breathing):    Full Support       Future Appointments   Date Time Provider Department Center   8/11/2022  4:00 PM [facility] [place]   12/12/2022  3:00 PM Karli Alonzo APRN [facility] [place]     Additional Instructions for the Follow-ups that You Need to Schedule     Discharge Follow-up with PCP   As directed       Currently Documented PCP:    Karli Alonzo APRN    PCP Phone Number:    [phone]     Follow Up Details: Please call to schedule a 1 week or earliest available follow-up appointment PCP; BP, CBC, BMP            Contact information for follow-up providers     Karli Alonzo APRN .    Specialties: Family Medicine, Nurse Practitioner  Why: Please call to schedule a 1 week or earliest available follow-up appointment PCP; BP, CBC, BMP  Contact information:  [address] [facility] [address] [phone]                   Contact information for after-discharge care     Destination     [facility] .    Service: Skilled Nursing  Contact information:  [address] [place] [id number] [phone]                             Additional Instructions for the Follow-ups that You Need to Schedule     Discharge Follow-up with PCP   As  directed       Currently Documented PCP:    Karli Alonzo APRN    PCP Phone Number:    [phone]     Follow Up Details: Please call to schedule a 1 week or earliest available follow-up appointment PCP; BP, CBC, BMP           Time Spent on Discharge:  Greater than 30 minutes      DOUGLAS Sherman  [facility]  08/11/22  13:44 EDT

## 2022-08-11 NOTE — PROGRESS NOTES
Continued Stay Note  [facility]     Patient Name: Max Shah  MRN: [id number]  Today's Date: 8/11/2022    Admit Date: 8/6/2022     Discharge Plan     Row Name 08/11/22 0959       Plan    Plan [facility] skilled, [facility] wheelchair van scheduled at 4:00 PM    Patient/Family in Agreement with Plan yes    Plan Comments Per Delilah [facility] can accept pt for skilled rehab with a bed available today. Pharmacy updated. [facility] wheelchair van scheduled at 4:00 PM. Packet in CCP office. Stefania Brar LCSW               Discharge Codes    No documentation.               Expected Discharge Date and Time     Expected Discharge Date Expected Discharge Time    Aug 11, 2022             Anh Brar LCSW

## 2022-08-11 NOTE — THERAPY TREATMENT NOTE
Patient Name: Max Shah  : 1934    MRN: [id number]                              Today's Date: 2022       Admit Date: 2022    Visit Dx:     ICD-10-CM ICD-9-CM   1. Acute pain of left knee  M25.562 719.46   2. Fall, initial encounter  W19.XXXA E888.9   3. Unable to ambulate  R26.2 719.7     Patient Active Problem List   Diagnosis   • Hypertension   • CAD (coronary artery disease)   • GERD (gastroesophageal reflux disease)   • Chronic fatigue   • Hand pain, right   • Stenosis of carotid artery   • Diastolic dysfunction   • Hyperlipidemia   • Pulmonary hypertension (HCC)   • Obstructive sleep apnea   • Lightheadedness   • Paresthesia of both feet   • Elevated glucose   • Leg weakness, bilateral   • Balance disorder   • Fall   • Left knee injury   • Right bundle branch block   • Acute urinary retention   • Acute pain of left knee   • Acute UTI (urinary tract infection)   • Abdominal aortic aneurysm (AAA) without rupture ([facility])     Past Medical History:   Diagnosis Date   • Arthritis    • CAD (coronary artery disease)    • Cataracts, bilateral    • CPAP (continuous positive airway pressure) dependence    • Diverticulitis    • Fatigue    • GERD (gastroesophageal reflux disease)    • Hyperlipidemia    • Hypertension    • Knee pain    • PAOD (peripheral arterial occlusive disease) ([facility])    • Shortness of breath    • Sleep apnea    • Varicose veins      Past Surgical History:   Procedure Laterality Date   • ANGIOPLASTY      Cerebral   • CARDIAC CATHETERIZATION     • CAROTID ARTERY ANGIOPLASTY  2001   • CATARACT EXTRACTION W/ INTRAOCULAR LENS IMPLANT Left 2017    Procedure: LT CATARACT PHACO EXTRACTION WITH INTRAOCULAR LENS IMPLANT;  Surgeon: Syed Tang MD;  Location: [facility] OR [facility];  Service:    • CATARACT EXTRACTION W/ INTRAOCULAR LENS IMPLANT Right 2017    Procedure: RT CATARACT PHACO EXTRACTION WITH INTRAOCULAR LENS IMPLANT;  Surgeon: Syed Tang MD;   Location[place] [facility] [place] [facility];  Service:    • THROMBOENDARTERECTOMY Right 2001      General Information     Row Name 08/11/22 1021          Physical Therapy Time and Intention    Document Type therapy note (daily note)  -DJ     Mode of Treatment individual therapy;physical therapy  -DJ     Row Name 08/11/22 1021          General Information    Existing Precautions/Restrictions fall  -DJ     Row Name 08/11/22 1021          Living Environment    People in Home other (see comments)  wife currently at [facility]  -DJ     Row Name 08/11/22 1021          Cognition    Orientation Status (Cognition) oriented x 3  -DJ     Row Name 08/11/22 1021          Safety Issues, Functional Mobility    Comment, Safety Issues/Impairments (Mobility) gt belt, nonskid socks  -DJ           User Key  (r) = Recorded By, (t) = Taken By, (c) = Cosigned By    Initials Name Provider Type    Trina Valdez, PT Physical Therapist               Mobility     Row Name 08/11/22 1022          Bed Mobility    Comment, (Bed Mobility) UIC  -DJ     Row Name 08/11/22 1022          Transfers    Comment, (Transfers) sit/stand from recliner x 5 reps  -DJ     Row Name 08/11/22 1022          Bed-Chair Transfer    Bed-Chair [place] (Transfers) not tested  -DJ     Row Name 08/11/22 1022          Sit-Stand Transfer    Sit-Stand [place] (Transfers) minimum assist (75% patient effort);1 person assist;2 person assist;verbal cues  -DJ     Assistive Device (Sit-Stand Transfers) walker, front-wheeled  -DJ     Comment, (Sit-Stand Transfer) mod A on 1st 2 attempts, improved to CGA-min A for additional reps  -DJ     Row Name 08/11/22 1022          Gait/Stairs (Locomotion)    [place] Level (Gait) minimum assist (75% patient effort);verbal cues;2 person assist  -DJ     Assistive Device (Gait) walker, front-wheeled  -DJ     Distance in Feet (Gait) 230'  -DJ     Deviations/Abnormal Patterns (Gait) gait speed decreased;stride length decreased;base of support, narrow   -DJ     Bilateral Gait Deviations forward flexed posture  -DJ     [place] Level (Stairs) not tested  -DJ     Comment, (Gait/Stairs) Pt amb 25 - 30'with r wx and min A of 2. She required 2 brief standing rest breaks. She dem L LE limp, slow pace, flexed posture, poor endurance.  -DJ           User Key  (r) = Recorded By, (t) = Taken By, (c) = Cosigned By    Initials Name Provider Type    Trina Valdez PT Physical Therapist               Obj/Interventions     Row Name 08/11/22 1026          Motor Skills    Motor Skills functional endurance  -DJ     Functional Endurance improving  -DJ     Therapeutic Exercise other (see comments)  transport arrived to take pt to CT scan  -DJ     Row Name 08/11/22 1026          Balance    Balance Assessment standing static balance;standing dynamic balance  -DJ     Static Standing Balance contact guard;verbal cues  -DJ     Dynamic Standing Balance minimal assist;verbal cues;1-person assist;2-person assist  -DJ     Position/Device Used, Standing Balance walker, front-wheeled  -DJ     Balance Interventions sitting;standing;sit to stand;supported;weight shifting activity  -DJ     Comment, Balance fair with r wx  -DJ           User Key  (r) = Recorded By, (t) = Taken By, (c) = Cosigned By    Initials Name Provider Type    Trina Valdez PT Physical Therapist               Goals/Plan    No documentation.                Clinical Impression     Row Name 08/11/22 1028          Pain    Pre/Posttreatment Pain Comment Generally without c/o pain today  -DJ     Pain Intervention(s) Repositioned;Rest  -DJ     Row Name 08/11/22 1028          Plan of Care Review    Plan of Care Reviewed With patient  -DJ     Progress improving  -DJ     Outcome Evaluation Pt UI in NAD, pleasant and cooperative. Pt stood from recliner chair 5x - initially req mod A of 2 but able to progress to CGA/min A.Pt amb 230' with r wx and min A of 1-2, vc to slow pace initially. Pt dem flexed posture and decreased B  TKE. Pt consistently drifts to R despite freq vc to maintain straight path. Endurance improving. Pt returned to chair - unable to perform ther ex due to transport present to take pt for CT scan. Overall, pt dem increased amb distance and endurance today. Still has difficulty rising from low surfaces. Cont PT to progress as tolerated and prepare for d/c. Pt requesting rehab prior to returning home.  -DJ     Row Name 08/11/22 1028          Therapy Assessment/Plan (PT)    Patient/Family Therapy Goals Statement (PT) rehab  -DJ     Criteria for Skilled Interventions Met (PT) skilled treatment is necessary  -DJ     Row Name 08/11/22 1028          Vital Signs    Pre Patient Position Sitting  -DJ     Intra Patient Position Standing  -DJ     Post Patient Position Sitting  -DJ     Row Name 08/11/22 1028          Positioning and Restraints    Pre-Treatment Position sitting in chair/recliner  -DJ     Post Treatment Position chair  -DJ     In Chair reclined;call light within reach;encouraged to call for assist;exit alarm on;with other staff  -DJ           User Key  (r) = Recorded By, (t) = Taken By, (c) = Cosigned By    Initials Name Provider Type    Trina Valdez, PT Physical Therapist               Outcome Measures     Row Name 08/11/22 1036          How much help from another person do you currently need...    Turning from your back to your side while in flat bed without using bedrails? 3  -DJ     Moving from lying on back to sitting on the side of a flat bed without bedrails? 3  -DJ     Moving to and from a bed to a chair (including a wheelchair)? 3  -DJ     Standing up from a chair using your arms (e.g., wheelchair, bedside chair)? 3  -DJ     Climbing 3-5 steps with a railing? 2  -DJ     To walk in hospital room? 3  -DJ     AM-PAC 6 Clicks Score (PT) 17  -DJ     Highest level of mobility 5 --> Static standing  -DJ     Row Name 08/11/22 1036          Functional Assessment    Outcome Measure Options AM-PAC 6 Clicks Basic  Mobility (PT)  -           User Key  (r) = Recorded By, (t) = Taken By, (c) = Cosigned By    Initials Name Provider Type    DJ Trina Piña, PT Physical Therapist                             Physical Therapy Education                 Title: PT OT SLP Therapies (Done)     Topic: Physical Therapy (Done)     Point: Mobility training (Done)     Learning Progress Summary           Patient Acceptance, E, VU,NR by DJ at 8/11/2022 1037    Acceptance, E, VU by HF at 8/10/2022 1802    Acceptance, E,TB, VU,NR by CB at 8/10/2022 1200    Acceptance, E,TB, VU,NR by CB at 8/8/2022 0951                   Point: Home exercise program (Done)     Learning Progress Summary           Patient Acceptance, E, VU by HF at 8/10/2022 1802                   Point: Body mechanics (Done)     Learning Progress Summary           Patient Acceptance, E, VU,NR by DJ at 8/11/2022 1037    Acceptance, E, VU by  at 8/10/2022 1802                   Point: Precautions (Done)     Learning Progress Summary           Patient Acceptance, E, VU,NR by  at 8/11/2022 1037    Acceptance, E, VU by  at 8/10/2022 1802                               User Key     Initials Effective Dates Name Provider Type Discipline     10/25/19 -  Trina Piña, PT Physical Therapist PT    CB 10/22/21 -  Vianey Hager, [place] NENA Physical Therapist PT    HF 01/18/22 -  Evelyn Watson, RN Registered Nurse Nurse              PT Recommendation and Plan     Plan of Care Reviewed With: patient  Progress: improving  Outcome Evaluation: Pt UIC in NAD, pleasant and cooperative. Pt stood from recliner chair 5x - initially req mod A of 2 but able to progress to CGA/min A.Pt amb 230' with r wx and min A of 1-2, vc to slow pace initially. Pt dem flexed posture and decreased B TKE. Pt consistently drifts to R despite freq vc to maintain straight path. Endurance improving. Pt returned to chair - unable to perform ther ex due to transport present to take pt for CT scan. Overall, pt dem increased  amb distance and endurance today. Still has difficulty rising from low surfaces. Cont PT to progress as tolerated and prepare for d/c. Pt requesting rehab prior to returning home.     Time Calculation:    PT Charges     Row Name 08/11/22 1037             Time Calculation    Start Time 0925  -DJ      Stop Time 0946  -DJ      Time Calculation (min) 21 min  -DJ      PT Non-Billable Time (min) 10 min  -DJ      PT Received On 08/11/22  -DJ      PT - Next Appointment 08/12/22  -DJ            User Key  (r) = Recorded By, (t) = Taken By, (c) = Cosigned By    Initials Name Provider Type    Trina Valdez PT Physical Therapist              Therapy Charges for Today     Code Description Service Date Service Provider Modifiers Qty    93040914019 HC PT THERAPEUTIC ACT EA 15 MIN 8/11/2022 Trina Piña, PT GP 1    83645994033 HC PT THER SUPP EA 15 MIN 8/11/2022 Trina Piña PT GP 1          PT G-Codes  Outcome Measure Options: AM-PAC 6 Clicks Basic Mobility (PT)  AM-PAC 6 Clicks Score (PT): 17  AM-PAC 6 Clicks Score (OT): 16    Trina Piña PT  8/11/2022

## 2022-08-11 NOTE — PROGRESS NOTES
Case Management Discharge Note      [place] [facility] [place] [facility] [place]    Provided Post Acute Provider List?: Yes  Post Acute Provider List: Nursing Home    Selected Continued Care - Admitted Since 8/6/2022     Destination Coordination complete.    Service Provider Selected Services Address Phone Fax Patient Preferred    [facility]  Skilled Nursing [address] [facility] [phone] --          Durable Medical Equipment    No services have been selected for the patient.              Dialysis/Infusion    No services have been selected for the patient.              Home Medical Care    No services have been selected for the patient.              Therapy    No services have been selected for the patient.              Community Resources    No services have been selected for the patient.              Community & DME    No services have been selected for the patient.                  Transportation Services  W/C Van: Other ([facility])    Final Discharge Disposition Code: 03 - skilled nursing facility (SNF)

## 2022-08-11 NOTE — PLAN OF CARE
Goal Outcome Evaluation:  Plan of Care Reviewed With: patient        Progress: improving  Outcome Evaluation: Pt UIC in NAD, pleasant and cooperative. Pt stood from recliner chair 5x - initially req mod A of 2 but able to progress to CGA/min A.Pt amb 230' with r wx and min A of 1-2, vc to slow pace initially. Pt dem flexed posture and decreased B TKE. Pt consistently drifts to R despite freq vc to maintain straight path. Endurance improving. Pt returned to chair - unable to perform ther ex due to transport present to take pt for CT scan. Overall, pt dem increased amb distance and endurance today. Still has difficulty rising from low surfaces. Cont PT to progress as tolerated and prepare for d/c. Pt requesting rehab prior to returning home.

## 2022-08-11 NOTE — PLAN OF CARE
Problem: Adult Inpatient Plan of Care  Goal: Plan of Care Review  Outcome: Ongoing, Progressing  Goal: Patient-Specific Goal (Individualized)  Outcome: Ongoing, Progressing  Goal: Absence of Hospital-Acquired Illness or Injury  Outcome: Ongoing, Progressing  Intervention: Identify and Manage Fall Risk  Recent Flowsheet Documentation  Taken 8/11/2022 0800 by Evelyn Watson RN  Safety Promotion/Fall Prevention: safety round/check completed  Intervention: Prevent Skin Injury  Recent Flowsheet Documentation  Taken 8/11/2022 0800 by Evelyn Watson RN  Body Position: position changed independently  Intervention: Prevent and Manage VTE (Venous Thromboembolism) Risk  Recent Flowsheet Documentation  Taken 8/11/2022 0800 by Evelyn Watson RN  Activity Management: activity adjusted per tolerance  VTE Prevention/Management:   bilateral   sequential compression devices on  Goal: Optimal Comfort and Wellbeing  Outcome: Ongoing, Progressing  Intervention: Provide Person-Centered Care  Recent Flowsheet Documentation  Taken 8/11/2022 0800 by Evelyn Watson RN  Trust Relationship/Rapport:   care explained   choices provided  Goal: Readiness for Transition of Care  Outcome: Ongoing, Progressing     Problem: Fall Injury Risk  Goal: Absence of Fall and Fall-Related Injury  Outcome: Ongoing, Progressing  Intervention: Identify and Manage Contributors  Recent Flowsheet Documentation  Taken 8/11/2022 0800 by Evelyn Watson RN  Medication Review/Management: medications reviewed  Intervention: Promote Injury-Free Environment  Recent Flowsheet Documentation  Taken 8/11/2022 0800 by Evelyn Watson RN  Safety Promotion/Fall Prevention: safety round/check completed   Goal Outcome Evaluation:

## 2022-08-11 NOTE — PLAN OF CARE
Problem: Adult Inpatient Plan of Care  Goal: Plan of Care Review  Outcome: Ongoing, Progressing  Flowsheets (Taken 8/11/2022 0446)  Progress: no change  Plan of Care Reviewed With: patient  Outcome Evaluation: Patient was retaining urine last night. Hodge catheter was inserted per order. Bed alarm on. Norco given x1 for pain and fever. Bed alarm on. Will continue to monitor vital signs, labs and comfort.   Goal Outcome Evaluation:  Plan of Care Reviewed With: patient        Progress: no change  Outcome Evaluation: Patient was retaining urine last night. Hodge catheter was inserted per order. Bed alarm on. Norco given x1 for pain and fever. Bed alarm on. Will continue to monitor vital signs, labs and comfort.

## 2022-08-11 NOTE — CONSULTS
Nutrition Services    Patient Name:  Max Shah  YOB: 1934  MRN: [id number]  Admit Date:  8/6/2022      Comment: MD consult for MSA:  Pt s/p fall w/ L knee injury, acute UTI, abdominal aortic aneurysm, acute urinary retention, acute L knee pain, MARKO, HTN, CAD.    Pt states he eats 2 meals/day at home (breakfast and dinner) and occasionally a Boost supplement. Pt has had 28 lb (13%) wt loss x 5 months. Pt stated he has had 40 lb wt loss in less than a year. Pt stated he still has a good appetite but is still losing wt. Pt stated his wife has gone into a NH 2 months ago and he goes to visit her every day at lunch time. Pt tolerating Regular diet now w/ 75% po intake of meals. Last BM 8/8. Pt agreed to Boost Plus TID (chocolate) and provided one to pt now.    Severe Malnutrition (Based on ASPEN/AND criteria, pt meets nutrition diagnosis of Severe Malnutrition of Chronic Disease due to inadequate po intake, 28 lb (13%) wt loss x 5 months and moderate fat and muscle wasting noted on NFPE.)    Recommend:  1. Boost Plus TID (chocolate), will order.    Will follow per protocol.    CLINICAL NUTRITION ASSESSMENT      Reason for Assessment Malnutrition Severity Assessment (MSA), Physician Consult     H&P  Chief Complaint   Patient presents with   • Fall   • Knee Pain         Mr. Shah is a 88 y.o. non-smoker with a history of hypertension, coronary artery disease, carotid artery stenosis s/p intervention in 2001, MARKO, chronic RBBB who presents to [facility] from home after a mechanical fall (tripped on his rug in his home) yesterday without loss of consciousness complaining of bilateral knee pain. He reports that both knees swelled immediately after his fall, but the right returned to normal quickly while the left remained swollen. He states that last night went okay and he managed to eat dinner and go to bed, but when he woke, I wasn't able to move the left knee and couldn't get out of bed  or walk. He lives alone.      Past Medical History:   Diagnosis Date   • Arthritis    • CAD (coronary artery disease)    • Cataracts, bilateral    • CPAP (continuous positive airway pressure) dependence    • Diverticulitis    • Fatigue    • GERD (gastroesophageal reflux disease)    • Hyperlipidemia    • Hypertension    • Knee pain    • PAOD (peripheral arterial occlusive disease) ([facility])    • Shortness of breath    • Sleep apnea    • Varicose veins        Past Surgical History:   Procedure Laterality Date   • ANGIOPLASTY  2002    Cerebral   • CARDIAC CATHETERIZATION     • CAROTID ARTERY ANGIOPLASTY  01/12/2001   • CATARACT EXTRACTION W/ INTRAOCULAR LENS IMPLANT Left 2/14/2017    Procedure: LT CATARACT PHACO EXTRACTION WITH INTRAOCULAR LENS IMPLANT;  Surgeon: Syed Tang MD;  Location: [facility] OR [facility] [place];  Service:    • CATARACT EXTRACTION W/ INTRAOCULAR LENS IMPLANT Right 2/28/2017    Procedure: RT CATARACT PHACO EXTRACTION WITH INTRAOCULAR LENS IMPLANT;  Surgeon: Syed Tang MD;  Location: [facility] OR [facility] [place];  Service:    • THROMBOENDARTERECTOMY Right 2001        Current Problems   L knee injury, acute UTI, abdominal aortic aneurysm, acute urinary retention, acute L knee pain, s/p fall, MARKO, HTN, CAD       Nutritional Risk Screening       MST SCORE 0   Risk Screening Criteria Unintentional weight loss of 10 lb or more in the last 2 months     Encounter Information        Nutrition/Diet History:    Pt states he eats 2 meals/day at home (breakfast and dinner) and occasionally a Boost supplement. Pt has had 28 lb (13%) wt loss x 5 months. Pt stated he has had 40 lb wt loss in less than a year. Pt stated he still has a good appetite but is still losing wt. Pt stated his wife has gone into a NH 2 months ago and he goes to visit her every day at lunch time. Pt tolerating Regular diet now w/ 75% po intake of meals. Last BM 8/8. Pt agreed to Boost Plus TID (chocolate) and provided one to pt now.   Food  "Preferences:      Supplements: Likes chocolate Boost     Typical Activity Pattern: Ambulatory     Factors Affecting Intake: No factors at this time     Tests/Procedures: CT scan       Anthropometrics        Current Height  Current Weight  BMI kg/m2 Height: 180.3 cm (71\")  Weight: 81.6 kg (180 lb) (08/06/22 1026)  Body mass index is 25.1 kg/m².       Admission Weight Weight: 81.6 kg (180 lb)   Ideal Body Weight (IBW) 75.3 kg (166 lb)   Usual Body Weight (UBW) 208 lb (2/28) 220 lb last year per pt       Trending Weight Hx     Weight Change 28 lb (13%) wt loss x 5 months             PTA: Wt Readings from Last 30 Encounters:   08/06/22 1026 81.6 kg (180 lb)   02/28/22 1405 94.4 kg (208 lb 3.2 oz)   12/10/21 1350 95 kg (209 lb 6.4 oz)   10/26/21 1448 93 kg (205 lb)   04/26/21 1454 97.5 kg (215 lb)   08/31/20 1451 99.8 kg (220 lb)   08/12/19 1308 102 kg (225 lb 6.4 oz)   02/04/19 0901 103 kg (227 lb 3.2 oz)   11/05/18 1100 105 kg (232 lb)   08/03/18 1104 104 kg (230 lb 3.2 oz)   06/01/18 1446 104 kg (230 lb)   02/01/18 1041 103 kg (227 lb 12.8 oz)   11/06/17 1116 102 kg (225 lb)   08/01/17 1053 99.5 kg (219 lb 6.4 oz)   04/06/17 1520 103 kg (226 lb)   02/28/17 1123 101 kg (222 lb)   02/14/17 1242 99.8 kg (220 lb)   01/31/17 1057 101 kg (223 lb 9.6 oz)   12/20/16 1534 102 kg (225 lb)   09/28/16 1505 102 kg (225 lb)   09/13/16 1107 103 kg (226 lb)   09/06/16 0849 102 kg (225 lb)   07/28/16 1105 102 kg (224 lb)            Labs       Pertinent Labs    Results from last 7 days   Lab Units 08/11/22  0722 08/10/22  0703 08/09/22  0911   SODIUM mmol/L 137 136 135*   POTASSIUM mmol/L 4.0 3.7 4.2   CHLORIDE mmol/L 103 101 100   CO2 mmol/L 25.0 25.3 24.0   BUN mg/dL 15 19 22   CREATININE mg/dL 0.67* 0.81 1.00   CALCIUM mg/dL 9.2 8.8 9.4   GLUCOSE mg/dL 93 136* 113*     Results from last 7 days   Lab Units 08/11/22  0722   HEMOGLOBIN g/dL 12.1*   HEMATOCRIT % 36.2*   WBC 10*3/mm3 8.25     Results from last 7 days   Lab Units " 08/11/22  0722 08/10/22  0703 08/09/22  0911 08/07/22  0744 08/06/22  1111   PLATELETS 10*3/mm3 210 201 237 197 197     COVID19   Date Value Ref Range Status   08/11/2022 Not Detected Not Detected - Ref. Range Final     Lab Results   Component Value Date    HGBA1C 5.7 (H) 04/11/2022          Medications           Scheduled Medications aspirin, 81 mg, Oral, Daily  atorvastatin, 10 mg, Oral, Daily  bisacodyl, 10 mg, Rectal, Once  bisacodyl, 10 mg, Rectal, Daily  enoxaparin, 40 mg, Subcutaneous, Nightly  senna-docusate sodium, 2 tablet, Oral, BID  tamsulosin, 0.4 mg, Oral, Daily  vitamin B-12, 1,000 mcg, Oral, Daily       Infusions     PRN Medications •  acetaminophen  •  HYDROcodone-acetaminophen  •  magnesium hydroxide  •  melatonin  •  ondansetron **OR** ondansetron  •  [COMPLETED] Insert peripheral IV **AND** sodium chloride     Physical Findings        Physical Appearance alert, edematous, hard of hearing, oriented, room air     NFPE Consented to exam   --  Malnutrition Severity Assessment      Patient meets criteria for : Severe Malnutrition (Based on ASPEN/AND criteria, pt meets nutrition diagnosis of Severe Malnutrition of Chronic Disease due to inadequate po intake, 28 lb (13%) wt loss x 5 months and moderate fat and muscle wasting noted on NFPE.)  Malnutrition Type (last 8 hours)     Malnutrition Severity Assessment     Row Name 08/11/22 1522       Malnutrition Severity Assessment    Malnutrition Type Chronic Disease - Related Malnutrition    Row Name 08/11/22 1522       Insufficient Energy Intake     Insufficient Energy Intake Findings Moderate    Insufficient Energy Intake  <75% of est. energy requirement for > or equal to 3 months    Row Name 08/11/22 1522       Unintentional Weight Loss     Unintentional Weight Loss Findings Severe    Unintentional Weight Loss  Weight loss greater than 10% in six months  28 lb (13%) wt loss x 5 months    Row Name 08/11/22 1522       Muscle Loss    Loss of Muscle Mass  "Findings Moderate    [place] Region Moderate - slight depression    Clavicle Bone Region Moderate - some protrusion in females, visible in males    Acromion Bone Region Moderate - acromion may slightly protrude    Scapular Bone Region Moderate - mild depression, bones may show slightly    Dorsal Hand Region Moderate - slight depression    Row Name 08/11/22 1522       Fat Loss    Subcutaneous Fat Loss Findings Moderate    Orbital Region  Moderate -  somewhat hollowness, slightly dark circles    Upper Arm Region Moderate - some fat tissue, not ample    Row Name 08/11/22 1522       Fluid Accumulation (Edema)    Fluid Accumulation  Severe equals 3+ or 4+ pitting edema    Row Name 08/11/22 1522       Criteria Met (Must meet criteria for severity in at least 2 of these categories: M Wasting, Fat Loss, Fluid, Secondary Signs, Wt. Status, Intake)    Patient meets criteria for  Severe Malnutrition  Based on ASPEN/AND criteria, pt meets nutrition diagnosis of Severe Malnutrition of Chronic Disease due to inadequate po intake, 28 lb (13%) wt loss x 5 months and moderate fat and muscle wasting noted on NFPE.                   Edema  lower extremity , 3+ (moderate)   Gastrointestinal non-distended , normoactive, last bowel movement:   Tubes/Drains none   Oral/Mouth Cavity missing teeth   Skin skin intact, bruised knee   --  Estimated/Assessed Needs       Energy Requirements    Height for Calculation  Height: 180.3 cm (71\")   Weight for Calculation Weight: 81.6 kg (180 lb)   Method for Estimation  25 kcal/kg   EST Needs (kcal/day) 2040 kcals       Protein Requirements    Weight for Calculation Weight: 81.6 kg (180 lb)   EST Protein Needs (g/kg) 1.2 gm/kg, 1.5 gm/kg   EST Daily Needs (g/day) 98-122g       Fluid Requirements     Estimated Needs (mL/day) 2448ml     Current Nutrition Orders & Evaluation of Intake       Oral Nutrition     Food Allergies NKFA   Current PO Diet Diet Regular   Supplement n/a   PO Evaluation     Trending % " PO Intake 75%       --  Nutrition Diagnosis        Nutrition Dx Problem 1 Problem: Malnutrition    Etiology: Functional Diagnosis    Signs/Symptoms: Unintended Weight Change    Comment:        Intervention Goal        Intervention Goal(s) Maintain nutrition status, Meet estimated needs, Disease management/therapy, Tolerate PO , Increase intake and Maintain weight     Nutrition Intervention        RD Action Interview for preferences, Supplement provided, Encourage intake, Follow Tx Progress, Care plan reviewed and Recommend/ordered:      Nutrition Prescription        Diet Prescription Regular   Supplement Prescription Boost Plus, TID chocolate   Prescription Ordered yes   --  Monitor/Evaluation        Monitor Per protocol     RD to follow per protocol.      Electronically signed by:  Mary Coleman RD  08/11/22 15:22 EDT

## 2022-09-20 ENCOUNTER — TELEPHONE (OUTPATIENT)
Dept: INTERNAL MEDICINE | Facility: CLINIC | Age: 87
End: 2022-09-20

## 2022-09-20 NOTE — TELEPHONE ENCOUNTER
Nam from [facility] is calling to get verbal order for PT,     States they are going to see patient two times a week for three weeks- verbal ok given.     Aware that there was question for , Nam said he would call to check with patient also.     Any issues with this?    Thanks!

## 2022-09-20 NOTE — TELEPHONE ENCOUNTER
Sammie with [facility] called requesting verbal orders for once a week- for 9 weeks- I gave her the OK for this.     She states patient was recently d/c from rehab, he was sent home with a catheter. She states he f/u with Urology on 10/3/22, depending on how his f/u with Urology goes.     Any issue with this?

## 2022-10-17 ENCOUNTER — TELEPHONE (OUTPATIENT)
Dept: INTERNAL MEDICINE | Facility: CLINIC | Age: 87
End: 2022-10-17

## 2022-10-17 NOTE — TELEPHONE ENCOUNTER
Caller: Max Shah    Relationship: Self    Best call back number: [phone]    What orders are you requesting (i.e. lab or imaging): PHYSICAL THERAPY     In what timeframe would the patient need to come in: AS SOON AS POSSIBLE     Where will you receive your lab/imaging services: [facility] [phone]  [facility] [phone]    Additional notes: NEEDS CONTINUATION OF THERAPY ORDERS CALLED IN.

## 2022-10-21 DIAGNOSIS — E78.49 OTHER HYPERLIPIDEMIA: ICD-10-CM

## 2022-10-21 RX ORDER — SIMVASTATIN 20 MG
TABLET ORAL
Qty: 90 TABLET | Refills: 1 | Status: SHIPPED | OUTPATIENT
Start: 2022-10-21 | End: 2022-11-21 | Stop reason: SDUPTHER

## 2022-11-21 DIAGNOSIS — E78.49 OTHER HYPERLIPIDEMIA: ICD-10-CM

## 2022-11-21 RX ORDER — SIMVASTATIN 20 MG
20 TABLET ORAL DAILY
Qty: 90 TABLET | Refills: 1 | Status: SHIPPED | OUTPATIENT
Start: 2022-11-21

## 2022-11-21 NOTE — TELEPHONE ENCOUNTER
Caller: Max Shah    Relationship: Self    Best call back number: [phone]    Requested Prescriptions:   Requested Prescriptions     Pending Prescriptions Disp Refills   • simvastatin (ZOCOR) 20 MG tablet 90 tablet 1     Sig: Take 1 tablet by mouth Daily.        Pharmacy where request should be sent: [facility]Y #[id number] [place], [place] - [address] [place] [address] [phone] [facility] [phone]      Additional details provided by patient: PATIENT HAS ABOUT 4 OR 5 DAY SUPPLY HE IS STILL LOOKING FOR A PROVIDER     Does the patient have less than a 3 day supply:  [] Yes  [x] No    Skylar Mcfarlane Rep   11/21/22 10:01 EST

## 2024-09-19 NOTE — TELEPHONE ENCOUNTER
----- Message from Dayana Jaime MD sent at 9/19/2024  9:20 AM CDT -----  Unremarkable PET scan. No evidence of myeloma or other malignancy.    Attempted to call patient to see if they are still being seen at our office, unable to leave a VM. Will try again-

## (undated) DEVICE — SWABSTK SKINPREP PVPI LF PK/3

## (undated) DEVICE — COVER,LIGHT HANDLE,FLX,2/PK: Brand: MEDLINE INDUSTRIES, INC.

## (undated) DEVICE — Device

## (undated) DEVICE — GLV SURG TRIUMPH CLASSIC PF LTX 8 STRL

## (undated) DEVICE — 1010 S-DRAPE TOWEL DRAPE 10/BX: Brand: STERI-DRAPE™

## (undated) DEVICE — SHIELD EYE ALUM